# Patient Record
Sex: MALE | Race: WHITE | NOT HISPANIC OR LATINO | Employment: OTHER | URBAN - METROPOLITAN AREA
[De-identification: names, ages, dates, MRNs, and addresses within clinical notes are randomized per-mention and may not be internally consistent; named-entity substitution may affect disease eponyms.]

---

## 2018-01-16 ENCOUNTER — GENERIC CONVERSION - ENCOUNTER (OUTPATIENT)
Dept: OTHER | Facility: OTHER | Age: 73
End: 2018-01-16

## 2018-01-16 DIAGNOSIS — Z87.442 PERSONAL HISTORY OF URINARY CALCULI: ICD-10-CM

## 2018-01-23 NOTE — MISCELLANEOUS
Message   Recorded as Task   Date: 01/16/2018 10:44 AM, Created By: Alejandro Pedersne   Task Name: Care Coordination   Assigned To: Roni BRITT,TEAM   Regarding Patient: Sherine Haider, Status: Active   Comment:    Alejandro Pedersen - 16 Jan 2018 10:44 AM     TASK CREATED  Patient stated he never got a script to get his xray done  Can someone please give him a call back? He doesn't know if he needs it 3 Cll Font Martelo - 16 Jan 2018 11:09 AM     TASK EDITED  PT NEEDS RENAL US  HE WILL [address] FAXED TO [facility] PER HIS REQUEST  # [phone]  HE WILL CALL TO RESCHED APPT WITH DR ROBERTSON  Active Problems    1  Back pain (724 5) (M54 9)   2  Cervical paraspinal muscle spasm (728 85) (M62 838)   3  Glenohumeral arthritis, right (715 91) (M19 011)   4  History of kidney stones (V13 01) (Z87 442)   5  Knee pain (719 46) (M25 569)   6  Left hip pain (719 45) (M25 552)   7  Rib pain (786 50) (R07 81)    Current Meds   1  Aspirin 81 MG TABS; Therapy: (Recorded:15Jan2018) to Recorded   2  Diovan 80 MG Oral Tablet (Valsartan); Therapy: (Recorded:15Jan2018) to Recorded   3  Flomax 0 4 MG Oral Capsule (Tamsulosin HCl); Therapy: (Recorded:15Jan2018) to Recorded   4  GlipiZIDE ER 10 MG Oral Tablet Extended Release 24 Hour; Therapy: (Recorded:27May2016) to Recorded   5  Januvia TABS; Therapy: (Recorded:15Jan2018) to Recorded   6  MetFORMIN HCl ER (OSM) 1000 MG Oral Tablet Extended Release 24 Hour; Therapy: (Recorded:27May2016) to Recorded   7  Ocuvite TABS; Therapy: (Recorded:15Jan2018) to Recorded   8  Toprol XL 25 MG Oral Tablet Extended Release 24 Hour (Metoprolol Succinate ER); Therapy: (Recorded:15Jan2018) to Recorded   9  Tricor 145 MG Oral Tablet (Fenofibrate); Therapy: (Recorded:27May2016) to Recorded   10  Viberzi 75 MG Oral Tablet; Therapy: (Recorded:27May2016) to Recorded   11  Vitamin D3 1000 UNIT Oral Tablet;     Therapy: (Recorded:15Jan2018) to Recorded   12  Vytorin 10-80 MG Oral Tablet (Ezetimibe-Simvastatin); Therapy: (Recorded:72Xas9823) to Recorded    Allergies    1   No Known Drug Allergies    Signatures   Electronically signed by : Karey Miller RN; Jan 16 2018 11:10AM EST                       (Author)

## 2018-05-25 LAB — HBA1C MFR BLD HPLC: 4.2 %

## 2018-10-17 ENCOUNTER — TELEPHONE (OUTPATIENT)
Dept: GASTROENTEROLOGY | Facility: MEDICAL CENTER | Age: 73
End: 2018-10-17

## 2018-10-17 NOTE — TELEPHONE ENCOUNTER
New pt called to sched [facility]to in [place] for possible IBS as per [place] referral in chart   Please call pt to sched

## 2018-10-18 ENCOUNTER — TELEPHONE (OUTPATIENT)
Dept: GASTROENTEROLOGY | Facility: MEDICAL CENTER | Age: 73
End: 2018-10-18

## 2018-10-29 ENCOUNTER — TELEPHONE (OUTPATIENT)
Dept: UROLOGY | Facility: AMBULATORY SURGERY CENTER | Age: 73
End: 2018-10-29

## 2018-10-29 NOTE — TELEPHONE ENCOUNTER
Spoke with pt who states he thinks he passed a kidney stone  He would like to keep his appt in 12/18  Gave him # to schedule JASON prior to appt    Order in computer for that

## 2018-11-05 ENCOUNTER — OFFICE VISIT (OUTPATIENT)
Dept: GASTROENTEROLOGY | Facility: MEDICAL CENTER | Age: 73
End: 2018-11-05
Payer: OTHER GOVERNMENT

## 2018-11-05 VITALS
WEIGHT: 132.6 LBS | HEART RATE: 95 BPM | DIASTOLIC BLOOD PRESSURE: 84 MMHG | TEMPERATURE: 98.8 F | SYSTOLIC BLOOD PRESSURE: 142 MMHG | BODY MASS INDEX: 21.73 KG/M2

## 2018-11-05 DIAGNOSIS — K63.5 COLON POLYP: Primary | ICD-10-CM

## 2018-11-05 DIAGNOSIS — R19.4 RECENT CHANGE IN FREQUENCY OF BOWEL MOVEMENTS: ICD-10-CM

## 2018-11-05 PROCEDURE — 99203 OFFICE O/P NEW LOW 30 MIN: CPT | Performed by: INTERNAL MEDICINE

## 2018-11-05 RX ORDER — DIPHENOXYLATE HYDROCHLORIDE AND ATROPINE SULFATE 2.5; .025 MG/1; MG/1
1 TABLET ORAL
COMMUNITY
Start: 2017-03-16

## 2018-11-05 RX ORDER — UBIDECARENONE 75 MG
CAPSULE ORAL
COMMUNITY
End: 2019-03-25 | Stop reason: ALTCHOICE

## 2018-11-05 RX ORDER — UBIDECARENONE 100 MG
100 CAPSULE ORAL
COMMUNITY
End: 2019-03-25 | Stop reason: ALTCHOICE

## 2018-11-05 RX ORDER — GLIPIZIDE 10 MG/1
TABLET, FILM COATED, EXTENDED RELEASE ORAL
COMMUNITY
End: 2019-03-25 | Stop reason: ALTCHOICE

## 2018-11-05 RX ORDER — EZETIMIBE AND SIMVASTATIN 10; 80 MG/1; MG/1
TABLET ORAL
COMMUNITY
End: 2019-03-25 | Stop reason: ALTCHOICE

## 2018-11-05 RX ORDER — FENOFIBRATE 145 MG/1
TABLET, COATED ORAL
COMMUNITY
End: 2019-03-25 | Stop reason: ALTCHOICE

## 2018-11-05 RX ORDER — CEPHALEXIN 500 MG/1
500 CAPSULE ORAL
COMMUNITY
Start: 2018-10-30 | End: 2018-11-06

## 2018-11-05 RX ORDER — METOPROLOL SUCCINATE 50 MG/1
50 TABLET, EXTENDED RELEASE ORAL
COMMUNITY
Start: 2017-03-16

## 2018-11-05 RX ORDER — LISINOPRIL 2.5 MG/1
5 TABLET ORAL
COMMUNITY
Start: 2017-04-24

## 2018-11-05 RX ORDER — SIMVASTATIN 80 MG
40 TABLET ORAL
COMMUNITY
End: 2019-10-15 | Stop reason: ALTCHOICE

## 2018-11-05 RX ORDER — LANOLIN ALCOHOL/MO/W.PET/CERES
4.5 CREAM (GRAM) TOPICAL
COMMUNITY
Start: 2017-03-16

## 2018-11-05 RX ORDER — BIOTIN 1 MG
TABLET ORAL
COMMUNITY
End: 2019-03-25 | Stop reason: ALTCHOICE

## 2018-11-05 RX ORDER — IBUPROFEN 600 MG/1
600 TABLET ORAL EVERY 6 HOURS PRN
Refills: 0 | COMMUNITY
Start: 2018-09-07 | End: 2019-03-25 | Stop reason: ALTCHOICE

## 2018-11-05 NOTE — PROGRESS NOTES
[facility] - Outpatient Consultation  Bowen Holley 68 y o  male MRN: [id number]  Encounter: [id number]          ASSESSMENT AND PLAN:      1  Colon polyp  2  Recent change in frequency of bowel movements  - patient has recent change of bowel habits but he still has bowel movement once a day with no overt symptoms of constipation    - he has history of colon polyp and recent change of bowel habits, I would recommend to do a colonoscopy  However patient has established care with [facility]    He prefers to go back to his previous GI doctor for further care and  for colonoscopy   - I recommended him to have celiac panel and IBD workups      ______________________________________________________________________    HPI:      71-year-old man with history of irritable bowel syndrome presented for recent change of bowel habits  Patient was referred to [place]  Patient reported he has been having bowel movement once a day and he reported he feels complete evacuation but he has intermittent constipation  He denies blood in the stool  His last colonoscopy was 5 years ago with findings of polyp  He reported he had similar episodes 4 years ago and his symptoms lasted for 2 years  His change of bowel habits was diagnosed for irritable  bowel syndrome  He reported he has lower abdominal sensation of irritability but denies overt abdominal pain  He denies family history of colon cancer  Denies use of NSAIDs  He has some weight loss but not significant  REVIEW OF SYSTEMS:    CONSTITUTIONAL: Denies any fever, chills, rigors, and weight loss  HEENT: No earache or tinnitus  Denies hearing loss or visual disturbances  CARDIOVASCULAR: No chest pain or palpitations  RESPIRATORY: Denies any cough, hemoptysis, shortness of breath or dyspnea on exertion  GASTROINTESTINAL: As noted in the History of Present Illness  GENITOURINARY: No problems with urination   Denies any hematuria or dysuria  NEUROLOGIC: No dizziness or vertigo, denies headaches  MUSCULOSKELETAL: Denies any muscle or joint pain  SKIN: Denies skin rashes or itching  ENDOCRINE: Denies excessive thirst  Denies intolerance to heat or cold  PSYCHOSOCIAL: Denies depression or anxiety  Denies any recent memory loss  Historical Information   History reviewed  No pertinent past medical history  Past Surgical History:   Procedure Laterality Date    CARDIAC SURGERY       Social History   History   Alcohol Use    Yes     History   Drug Use No     History   Smoking Status    Never Smoker   Smokeless Tobacco    Never Used     History reviewed  No pertinent family history  Meds/Allergies       Current Outpatient Prescriptions:     aspirin 81 MG tablet    cephalexin (KEFLEX) 500 mg capsule    Cholecalciferol (VITAMIN D3) 1000 units CAPS    co-enzyme Q-10 100 mg capsule    cyanocobalamin (VITAMIN B-12) 100 mcg tablet    ezetimibe-simvastatin (VYTORIN) 10-80 MG per tablet    fenofibrate (TRICOR) 145 mg tablet    glipiZIDE (GLUCOTROL XL) 10 mg 24 hr tablet    ibuprofen (MOTRIN) 600 mg tablet    lisinopril (ZESTRIL) 2 5 mg tablet    MAGNESIUM PO    melatonin 3 mg    metFORMIN (GLUCOPHAGE) 1000 MG tablet    metoprolol succinate (TOPROL-XL) 50 mg 24 hr tablet    multivitamin (THERAGRAN) TABS    simvastatin (ZOCOR) 80 mg tablet    No Known Allergies        Objective     Blood pressure 142/84, pulse 95, temperature 98 8 °F (37 1 °C), temperature source Tympanic, weight 60 1 kg (132 lb 9 6 oz)  Body mass index is 21 73 kg/m²  PHYSICAL EXAM:      General Appearance:   Alert, cooperative, no distress   HEENT:   Normocephalic, atraumatic, anicteric      Neck:  Supple, symmetrical, trachea midline   Lungs:   Clear to auscultation bilaterally; no rales, rhonchi or wheezing; respirations unlabored    Heart[de-identified]   Regular rate and rhythm; no murmur, rub, or gallop     Abdomen:   Soft, non-tender, non-distended; normal bowel sounds; no masses, no organomegaly    Genitalia:   Deferred    Rectal:   Deferred    Extremities:  No cyanosis, clubbing or edema    Pulses:  2+ and symmetric    Skin:  No jaundice, rashes, or lesions    Lymph nodes:  No palpable cervical lymphadenopathy        Lab Results:   No visits with results within 1 Day(s) from this visit  Latest known visit with results is:   Orders Only on 05/25/2018   Component Date Value    Hemoglobin A1C 05/25/2018 4 2          Radiology Results:   No results found

## 2018-11-05 NOTE — LETTER
November 5, 2018     Elsa Cordero MD  [address] [address]    Patient: Trish Murray   YOB: 1945   Date of Visit: 11/5/2018       Dear Dr Mihai Benoit: Thank you for referring Trish Murray to me for evaluation  Below are my notes for this consultation  If you have questions, please do not hesitate to call me  I look forward to following your patient along with you  Sincerely,        Rosina Jordan MD        CC: No Recipients  Rosina Jordan MD  11/5/2018  4:13 PM  Sign at close encounter  [facility] - Outpatient Consultation  Trish Murray 68 y o  male MRN: [id number]  Encounter: [id number]          ASSESSMENT AND PLAN:      1  Colon polyp  2  Recent change in frequency of bowel movements  - patient has recent change of bowel habits but he still has bowel movement once a day with no overt symptoms of constipation    - he has history of colon polyp and recent change of bowel habits, I would recommend to do a colonoscopy  However patient has established care with [facility]    He prefers to go back to his previous GI doctor for further care and  for colonoscopy   - I recommended him to have celiac panel and IBD workups      ______________________________________________________________________    HPI:      60-year-old man with history of irritable bowel syndrome presented for recent change of bowel habits  Patient was referred to [address] [place] [address]  Patient reported he has been having bowel movement once a day and he reported he feels complete evacuation but he has intermittent constipation  He denies blood in the stool  His last colonoscopy was 5 years ago with findings of polyp  He reported he had similar episodes 4 years ago and his symptoms lasted for 2 years  His change of bowel habits was diagnosed for irritable  bowel syndrome  He reported he has lower abdominal sensation of irritability but denies overt abdominal pain   He denies family history of colon cancer  Denies use of NSAIDs  He has some weight loss but not significant  REVIEW OF SYSTEMS:    CONSTITUTIONAL: Denies any fever, chills, rigors, and weight loss  HEENT: No earache or tinnitus  Denies hearing loss or visual disturbances  CARDIOVASCULAR: No chest pain or palpitations  RESPIRATORY: Denies any cough, hemoptysis, shortness of breath or dyspnea on exertion  GASTROINTESTINAL: As noted in the History of Present Illness  GENITOURINARY: No problems with urination  Denies any hematuria or dysuria  NEUROLOGIC: No dizziness or vertigo, denies headaches  MUSCULOSKELETAL: Denies any muscle or joint pain  SKIN: Denies skin rashes or itching  ENDOCRINE: Denies excessive thirst  Denies intolerance to heat or cold  PSYCHOSOCIAL: Denies depression or anxiety  Denies any recent memory loss  Historical Information   History reviewed  No pertinent past medical history  Past Surgical History:   Procedure Laterality Date    CARDIAC SURGERY       Social History   History   Alcohol Use    Yes     History   Drug Use No     History   Smoking Status    Never Smoker   Smokeless Tobacco    Never Used     History reviewed  No pertinent family history      Meds/Allergies       Current Outpatient Prescriptions:     aspirin 81 MG tablet    cephalexin (KEFLEX) 500 mg capsule    Cholecalciferol (VITAMIN D3) 1000 units CAPS    co-enzyme Q-10 100 mg capsule    cyanocobalamin (VITAMIN B-12) 100 mcg tablet    ezetimibe-simvastatin (VYTORIN) 10-80 MG per tablet    fenofibrate (TRICOR) 145 mg tablet    glipiZIDE (GLUCOTROL XL) 10 mg 24 hr tablet    ibuprofen (MOTRIN) 600 mg tablet    lisinopril (ZESTRIL) 2 5 mg tablet    MAGNESIUM PO    melatonin 3 mg    metFORMIN (GLUCOPHAGE) 1000 MG tablet    metoprolol succinate (TOPROL-XL) 50 mg 24 hr tablet    multivitamin (THERAGRAN) TABS    simvastatin (ZOCOR) 80 mg tablet    No Known Allergies        Objective     Blood pressure 142/84, pulse 95, temperature 98 8 °F (37 1 °C), temperature source Tympanic, weight 60 1 kg (132 lb 9 6 oz)  Body mass index is 21 73 kg/m²  PHYSICAL EXAM:      General Appearance:   Alert, cooperative, no distress   HEENT:   Normocephalic, atraumatic, anicteric      Neck:  Supple, symmetrical, trachea midline   Lungs:   Clear to auscultation bilaterally; no rales, rhonchi or wheezing; respirations unlabored    Heart[de-identified]   Regular rate and rhythm; no murmur, rub, or gallop  Abdomen:   Soft, non-tender, non-distended; normal bowel sounds; no masses, no organomegaly    Genitalia:   Deferred    Rectal:   Deferred    Extremities:  No cyanosis, clubbing or edema    Pulses:  2+ and symmetric    Skin:  No jaundice, rashes, or lesions    Lymph nodes:  No palpable cervical lymphadenopathy        Lab Results:   No visits with results within 1 Day(s) from this visit  Latest known visit with results is:   Orders Only on 05/25/2018   Component Date Value    Hemoglobin A1C 05/25/2018 4 2          Radiology Results:   No results found

## 2018-11-12 ENCOUNTER — OFFICE VISIT (OUTPATIENT)
Dept: FAMILY MEDICINE CLINIC | Facility: CLINIC | Age: 73
End: 2018-11-12
Payer: COMMERCIAL

## 2018-11-12 VITALS
HEIGHT: 65 IN | SYSTOLIC BLOOD PRESSURE: 120 MMHG | HEART RATE: 74 BPM | DIASTOLIC BLOOD PRESSURE: 78 MMHG | RESPIRATION RATE: 16 BRPM | TEMPERATURE: 98.5 F | WEIGHT: 131 LBS | OXYGEN SATURATION: 98 % | BODY MASS INDEX: 21.83 KG/M2

## 2018-11-12 DIAGNOSIS — I10 ESSENTIAL HYPERTENSION: ICD-10-CM

## 2018-11-12 DIAGNOSIS — F51.01 PRIMARY INSOMNIA: ICD-10-CM

## 2018-11-12 DIAGNOSIS — I25.10 ATHEROSCLEROSIS OF NATIVE CORONARY ARTERY OF NATIVE HEART WITHOUT ANGINA PECTORIS: ICD-10-CM

## 2018-11-12 DIAGNOSIS — E11.21 TYPE 2 DIABETES MELLITUS WITH DIABETIC NEPHROPATHY, WITHOUT LONG-TERM CURRENT USE OF INSULIN (HCC): Primary | ICD-10-CM

## 2018-11-12 PROBLEM — S12.000A FRACTURE OF C1 VERTEBRA, CLOSED (HCC): Status: ACTIVE | Noted: 2017-01-24

## 2018-11-12 PROBLEM — N17.9 AKI (ACUTE KIDNEY INJURY) (HCC): Status: ACTIVE | Noted: 2017-01-24

## 2018-11-12 PROBLEM — S32.10XA SACRAL FRACTURE (HCC): Status: ACTIVE | Noted: 2017-01-24

## 2018-11-12 PROBLEM — I25.5 ISCHEMIC CARDIOMYOPATHY: Status: ACTIVE | Noted: 2017-04-13

## 2018-11-12 LAB
CREAT UR-MCNC: 57 MG/DL
EST. AVERAGE GLUCOSE BLD GHB EST-MCNC: 128 MG/DL
HBA1C MFR BLD: 6.1 % (ref 4.2–6.3)
MICROALBUMIN UR-MCNC: 960 MG/L (ref 0–20)
MICROALBUMIN/CREAT 24H UR: 1684 MG/G CREATININE (ref 0–30)

## 2018-11-12 PROCEDURE — 3066F NEPHROPATHY DOC TX: CPT | Performed by: PHYSICIAN ASSISTANT

## 2018-11-12 PROCEDURE — 3062F POS MACROALBUMINURIA REV: CPT | Performed by: PHYSICIAN ASSISTANT

## 2018-11-12 PROCEDURE — 3044F HG A1C LEVEL LT 7.0%: CPT | Performed by: PHYSICIAN ASSISTANT

## 2018-11-12 PROCEDURE — 1101F PT FALLS ASSESS-DOCD LE1/YR: CPT | Performed by: PHYSICIAN ASSISTANT

## 2018-11-12 PROCEDURE — 3074F SYST BP LT 130 MM HG: CPT | Performed by: PHYSICIAN ASSISTANT

## 2018-11-12 PROCEDURE — 1036F TOBACCO NON-USER: CPT | Performed by: PHYSICIAN ASSISTANT

## 2018-11-12 PROCEDURE — 36415 COLL VENOUS BLD VENIPUNCTURE: CPT | Performed by: PHYSICIAN ASSISTANT

## 2018-11-12 PROCEDURE — 82043 UR ALBUMIN QUANTITATIVE: CPT | Performed by: PHYSICIAN ASSISTANT

## 2018-11-12 PROCEDURE — 3008F BODY MASS INDEX DOCD: CPT | Performed by: PHYSICIAN ASSISTANT

## 2018-11-12 PROCEDURE — 3078F DIAST BP <80 MM HG: CPT | Performed by: PHYSICIAN ASSISTANT

## 2018-11-12 PROCEDURE — 1160F RVW MEDS BY RX/DR IN RCRD: CPT | Performed by: PHYSICIAN ASSISTANT

## 2018-11-12 PROCEDURE — 82570 ASSAY OF URINE CREATININE: CPT | Performed by: PHYSICIAN ASSISTANT

## 2018-11-12 PROCEDURE — 99204 OFFICE O/P NEW MOD 45 MIN: CPT | Performed by: PHYSICIAN ASSISTANT

## 2018-11-12 PROCEDURE — 83036 HEMOGLOBIN GLYCOSYLATED A1C: CPT | Performed by: PHYSICIAN ASSISTANT

## 2018-11-12 RX ORDER — ZOLPIDEM TARTRATE 5 MG/1
5 TABLET ORAL
Qty: 30 TABLET | Refills: 5 | Status: SHIPPED | OUTPATIENT
Start: 2018-11-12 | End: 2018-11-16 | Stop reason: SINTOL

## 2018-11-12 NOTE — ASSESSMENT & PLAN NOTE
hba1c in May 6 0, will recheck today  Had a normal foot exam  Denies hypoglycemic episodes  Continue current management  UTD pneumonia and flu shot   Urine microalbumin collected

## 2018-11-13 ENCOUNTER — TELEPHONE (OUTPATIENT)
Dept: FAMILY MEDICINE CLINIC | Facility: CLINIC | Age: 73
End: 2018-11-13

## 2018-11-13 NOTE — TELEPHONE ENCOUNTER
----- Message from Carissa Echavarria PA-C sent at 11/13/2018 12:54 PM EST -----  Please tell pt hba1c well controlled at 6 1

## 2018-11-14 ENCOUNTER — APPOINTMENT (OUTPATIENT)
Dept: LAB | Facility: MEDICAL CENTER | Age: 73
End: 2018-11-14
Attending: INTERNAL MEDICINE
Payer: COMMERCIAL

## 2018-11-14 ENCOUNTER — HOSPITAL ENCOUNTER (OUTPATIENT)
Dept: ULTRASOUND IMAGING | Facility: MEDICAL CENTER | Age: 73
Discharge: HOME | End: 2018-11-14
Attending: UROLOGY
Payer: COMMERCIAL

## 2018-11-14 DIAGNOSIS — R19.4 RECENT CHANGE IN FREQUENCY OF BOWEL MOVEMENTS: ICD-10-CM

## 2018-11-14 DIAGNOSIS — Z87.442 PERSONAL HISTORY OF URINARY CALCULI: ICD-10-CM

## 2018-11-14 LAB
CRP SERPL QL: 5.9 MG/L
ERYTHROCYTE [SEDIMENTATION RATE] IN BLOOD: 18 MM/HOUR (ref 0–10)

## 2018-11-14 PROCEDURE — 86140 C-REACTIVE PROTEIN: CPT

## 2018-11-14 PROCEDURE — 82784 ASSAY IGA/IGD/IGG/IGM EACH: CPT

## 2018-11-14 PROCEDURE — 36415 COLL VENOUS BLD VENIPUNCTURE: CPT

## 2018-11-14 PROCEDURE — 83516 IMMUNOASSAY NONANTIBODY: CPT

## 2018-11-14 PROCEDURE — 85652 RBC SED RATE AUTOMATED: CPT

## 2018-11-14 PROCEDURE — 76770 US EXAM ABDO BACK WALL COMP: CPT

## 2018-11-14 PROCEDURE — 86255 FLUORESCENT ANTIBODY SCREEN: CPT

## 2018-11-15 LAB
ENDOMYSIUM IGA SER QL: NEGATIVE
GLIADIN PEPTIDE IGA SER-ACNC: 7 UNITS (ref 0–19)
GLIADIN PEPTIDE IGG SER-ACNC: 3 UNITS (ref 0–19)
IGA SERPL-MCNC: 220 MG/DL (ref 61–437)
TTG IGA SER-ACNC: <2 U/ML (ref 0–3)
TTG IGG SER-ACNC: <2 U/ML (ref 0–5)

## 2018-11-16 DIAGNOSIS — F51.01 PRIMARY INSOMNIA: Primary | ICD-10-CM

## 2018-11-16 RX ORDER — TRAZODONE HYDROCHLORIDE 50 MG/1
50 TABLET ORAL
Qty: 30 TABLET | Refills: 5 | Status: CANCELLED | OUTPATIENT
Start: 2018-11-16

## 2018-11-16 RX ORDER — AMITRIPTYLINE HYDROCHLORIDE 25 MG/1
25 TABLET, FILM COATED ORAL
Qty: 30 TABLET | Refills: 5 | Status: SHIPPED | OUTPATIENT
Start: 2018-11-16 | End: 2019-01-15 | Stop reason: SINTOL

## 2018-11-16 NOTE — TELEPHONE ENCOUNTER
Called patient back, offered trazodone but he states he did not do well on this either  Stated lorazepam worked well but I told him I would not use that as a sleep aid  We agreed to start amitriptyline 25 mg at night- pt had EKG without QT prolongation 2 weeks ago    He will also establish care with a sleep program through the [facility]

## 2018-11-16 NOTE — TELEPHONE ENCOUNTER
Patient called stating that you put him on [place], he is unable to take this medication because the first time he tried the medication he did not sleep well on it and felt funny  The second time he took the medication he stated that he had an upset stomach, nightmares and still did not feel good on the medication  The third time patient stated he had the same symptoms   Please advise

## 2018-11-29 ENCOUNTER — TELEPHONE (OUTPATIENT)
Dept: FAMILY MEDICINE CLINIC | Facility: CLINIC | Age: 73
End: 2018-11-29

## 2018-11-29 NOTE — TELEPHONE ENCOUNTER
Patient called stating that the amitriptyline is not working for his sleeping  Patient stated that he stopped the medication three weeks ago and he only used three tablets, states that he did not feel comfortable taking the medication  Patient stated that he did not like the feeling in his head, upset stomach  Please advise

## 2018-11-30 ENCOUNTER — TELEPHONE (OUTPATIENT)
Dept: FAMILY MEDICINE CLINIC | Facility: CLINIC | Age: 73
End: 2018-11-30

## 2018-11-30 DIAGNOSIS — F41.9 ANXIETY: Primary | ICD-10-CM

## 2018-11-30 RX ORDER — HYDROXYZINE HYDROCHLORIDE 25 MG/1
25 TABLET, FILM COATED ORAL EVERY 6 HOURS PRN
Qty: 30 TABLET | Refills: 0 | Status: SHIPPED | OUTPATIENT
Start: 2018-11-30 | End: 2019-01-15

## 2018-11-30 NOTE — TELEPHONE ENCOUNTER
[facility] returned my call Re: Pt's hx of ativan  They have been trying to reduce his ativan due to his age and the risk of respiratory depression  They gave a prescription for atarax and trazodone for the patient to take  He has an appt with NP on 12/19 to address these issues  Spoke to pt regarding this, states he never tried the atarax  I will rx him a prescription for this  Explained that he should not be seeing multiple providers regarding the same health issues   He will f/u with psych as scheduled and he has an appt with [address] as well

## 2018-11-30 NOTE — TELEPHONE ENCOUNTER
[address] where Dr Debby Bridges works who has been rxing ativan for the pt via [employer]- left message with MA for Dr Debby Bridges to return call regarding if he is still prescribing this for the patient and if not why as pt states he has been unable to tolerate trazodone, ambien, amitriptyline and states that "ativan is what works"

## 2018-12-03 ENCOUNTER — OFFICE VISIT (OUTPATIENT)
Dept: UROLOGY | Facility: MEDICAL CENTER | Age: 73
End: 2018-12-03
Payer: COMMERCIAL

## 2018-12-03 VITALS
BODY MASS INDEX: 21.66 KG/M2 | SYSTOLIC BLOOD PRESSURE: 120 MMHG | DIASTOLIC BLOOD PRESSURE: 80 MMHG | WEIGHT: 130 LBS | HEART RATE: 70 BPM | HEIGHT: 65 IN

## 2018-12-03 DIAGNOSIS — N40.1 BPH WITH URINARY OBSTRUCTION: ICD-10-CM

## 2018-12-03 DIAGNOSIS — N28.1 RENAL CYST: ICD-10-CM

## 2018-12-03 DIAGNOSIS — R31.9 HEMATURIA, UNSPECIFIED TYPE: Primary | ICD-10-CM

## 2018-12-03 DIAGNOSIS — N13.8 BPH WITH URINARY OBSTRUCTION: ICD-10-CM

## 2018-12-03 LAB
SL AMB  POCT GLUCOSE, UA: ABNORMAL
SL AMB LEUKOCYTE ESTERASE,UA: ABNORMAL
SL AMB POCT BILIRUBIN,UA: ABNORMAL
SL AMB POCT BLOOD,UA: ABNORMAL
SL AMB POCT CLARITY,UA: CLEAR
SL AMB POCT COLOR,UA: YELLOW
SL AMB POCT KETONES,UA: ABNORMAL
SL AMB POCT NITRITE,UA: ABNORMAL
SL AMB POCT PH,UA: 5.5
SL AMB POCT SPECIFIC GRAVITY,UA: 1.01
SL AMB POCT URINE PROTEIN: 100
SL AMB POCT UROBILINOGEN: 1

## 2018-12-03 PROCEDURE — 81003 URINALYSIS AUTO W/O SCOPE: CPT | Performed by: UROLOGY

## 2018-12-03 PROCEDURE — 99214 OFFICE O/P EST MOD 30 MIN: CPT | Performed by: UROLOGY

## 2018-12-03 NOTE — LETTER
December 3, 2018     [address] [place] [address]    Patient: Henny Hanson   YOB: 1945   Date of Visit: 12/3/2018       Dear Dr Ion Jiménez: Thank you for referring Henny Hanson to me for evaluation  Below are my notes for this consultation  If you have questions, please do not hesitate to call me  I look forward to following your patient along with you  Sincerely,        Indra Ibrahim MD        CC: No Recipients  Indra Ibrahim MD  12/3/2018  3:38 PM  Sign at close encounter  Assessment/Plan:  1  Likely passed a small stone, urine is clear now  2  Ultrasound last month showed no kidney stones, some chronic renal cyst that we have seen before  3  Check PSA soon  4  If no other symptoms, follow-up two years  No problem-specific Assessment & Plan notes found for this encounter  Diagnoses and all orders for this visit:    Hematuria, unspecified type  -     POCT urine dip auto non-scope    BPH with urinary obstruction  -     PSA Total, Diagnostic; Future    Renal cyst          Subjective:      Patient ID: Henny Hanson is a 68 y o  male  Follow-up for history of stones:  Treatment many years ago and no symptoms at all until just recently  Six weeks ago had some urgency frequency, lower abdominal pain and pressure and slight amount of gross hematuria  After he voided, symptoms all went away  Never really saw stone come out, but he feels that is what it was  Minimal BPH symptoms, occasional urgency but nothing bothersome        The following portions of the patient's history were reviewed and updated as appropriate: allergies, current medications, past family history, past medical history, past social history, past surgical history and problem list     Review of Systems   All other systems reviewed and are negative          Objective:      /80 (BP Location: Left arm, Patient Position: Sitting, Cuff Size: Standard)   Pulse 70   Ht 5' 5" (1 651 m)   Wt 59 kg (130 lb)   BMI 21 63 kg/m²           Physical Exam   Constitutional: He is oriented to person, place, and time  He appears well-developed and well-nourished  No distress  HENT:   Head: Normocephalic and atraumatic  Eyes: Conjunctivae are normal    Cardiovascular: Normal rate and regular rhythm  Pulmonary/Chest: Effort normal and breath sounds normal  No respiratory distress  He has no wheezes  Abdominal: Soft  Bowel sounds are normal  He exhibits no distension and no mass  There is no tenderness  Genitourinary:   Genitourinary Comments: Penis and testes normal    Prostate minimally enlarged no nodules  Neurological: He is alert and oriented to person, place, and time  Skin: Skin is warm and dry  He is not diaphoretic

## 2018-12-03 NOTE — PROGRESS NOTES
Assessment/Plan:  1  Likely passed a small stone, urine is clear now  2  Ultrasound last month showed no kidney stones, some chronic renal cyst that we have seen before  3  Check PSA soon  4  If no other symptoms, follow-up two years  No problem-specific Assessment & Plan notes found for this encounter  Diagnoses and all orders for this visit:    Hematuria, unspecified type  -     POCT urine dip auto non-scope    BPH with urinary obstruction  -     PSA Total, Diagnostic; Future    Renal cyst          Subjective:      Patient ID: Everette Jeans is a 68 y o  male  Follow-up for history of stones:  Treatment many years ago and no symptoms at all until just recently  Six weeks ago had some urgency frequency, lower abdominal pain and pressure and slight amount of gross hematuria  After he voided, symptoms all went away  Never really saw stone come out, but he feels that is what it was  Minimal BPH symptoms, occasional urgency but nothing bothersome        The following portions of the patient's history were reviewed and updated as appropriate: allergies, current medications, past family history, past medical history, past social history, past surgical history and problem list     Review of Systems   All other systems reviewed and are negative  Objective:      /80 (BP Location: Left arm, Patient Position: Sitting, Cuff Size: Standard)   Pulse 70   Ht 5' 5" (1 651 m)   Wt 59 kg (130 lb)   BMI 21 63 kg/m²          Physical Exam   Constitutional: He is oriented to person, place, and time  He appears well-developed and well-nourished  No distress  HENT:   Head: Normocephalic and atraumatic  Eyes: Conjunctivae are normal    Cardiovascular: Normal rate and regular rhythm  Pulmonary/Chest: Effort normal and breath sounds normal  No respiratory distress  He has no wheezes  Abdominal: Soft  Bowel sounds are normal  He exhibits no distension and no mass  There is no tenderness  Genitourinary:   Genitourinary Comments: Penis and testes normal    Prostate minimally enlarged no nodules  Neurological: He is alert and oriented to person, place, and time  Skin: Skin is warm and dry  He is not diaphoretic

## 2018-12-03 NOTE — LETTER
December 3, 2018     No Recipients    Patient: Paige Henderson   YOB: 1945   Date of Visit: 12/3/2018       Dear Dr Ritesh Knig Recipients: Thank you for referring Paige Henderson to me for evaluation  Below are my notes for this consultation  If you have questions, please do not hesitate to call me  I look forward to following your patient along with you  Sincerely,        Priti Yu MD        CC: No Recipients  Priti Yu MD  12/3/2018  3:38 PM  Sign at close encounter  Assessment/Plan:  1  Likely passed a small stone, urine is clear now  2  Ultrasound last month showed no kidney stones, some chronic renal cyst that we have seen before  3  Check PSA soon  4  If no other symptoms, follow-up two years  No problem-specific Assessment & Plan notes found for this encounter  Diagnoses and all orders for this visit:    Hematuria, unspecified type  -     POCT urine dip auto non-scope    BPH with urinary obstruction  -     PSA Total, Diagnostic; Future    Renal cyst          Subjective:      Patient ID: Paige Henderson is a 68 y o  male  Follow-up for history of stones:  Treatment many years ago and no symptoms at all until just recently  Six weeks ago had some urgency frequency, lower abdominal pain and pressure and slight amount of gross hematuria  After he voided, symptoms all went away  Never really saw stone come out, but he feels that is what it was  Minimal BPH symptoms, occasional urgency but nothing bothersome        The following portions of the patient's history were reviewed and updated as appropriate: allergies, current medications, past family history, past medical history, past social history, past surgical history and problem list     Review of Systems   All other systems reviewed and are negative          Objective:      /80 (BP Location: Left arm, Patient Position: Sitting, Cuff Size: Standard)   Pulse 70   Ht 5' 5" (1 651 m)   Wt 59 kg (130 lb)   BMI 21 63 kg/m²           Physical Exam   Constitutional: He is oriented to person, place, and time  He appears well-developed and well-nourished  No distress  HENT:   Head: Normocephalic and atraumatic  Eyes: Conjunctivae are normal    Cardiovascular: Normal rate and regular rhythm  Pulmonary/Chest: Effort normal and breath sounds normal  No respiratory distress  He has no wheezes  Abdominal: Soft  Bowel sounds are normal  He exhibits no distension and no mass  There is no tenderness  Genitourinary:   Genitourinary Comments: Penis and testes normal    Prostate minimally enlarged no nodules  Neurological: He is alert and oriented to person, place, and time  Skin: Skin is warm and dry  He is not diaphoretic

## 2018-12-05 ENCOUNTER — APPOINTMENT (OUTPATIENT)
Dept: RADIOLOGY | Facility: MEDICAL CENTER | Age: 73
End: 2018-12-05
Payer: COMMERCIAL

## 2018-12-05 ENCOUNTER — APPOINTMENT (OUTPATIENT)
Dept: LAB | Facility: MEDICAL CENTER | Age: 73
End: 2018-12-05
Payer: COMMERCIAL

## 2018-12-05 ENCOUNTER — OFFICE VISIT (OUTPATIENT)
Dept: URGENT CARE | Facility: MEDICAL CENTER | Age: 73
End: 2018-12-05
Payer: COMMERCIAL

## 2018-12-05 ENCOUNTER — APPOINTMENT (OUTPATIENT)
Dept: LAB | Facility: MEDICAL CENTER | Age: 73
End: 2018-12-05
Attending: UROLOGY
Payer: COMMERCIAL

## 2018-12-05 ENCOUNTER — TRANSCRIBE ORDERS (OUTPATIENT)
Dept: URGENT CARE | Facility: MEDICAL CENTER | Age: 73
End: 2018-12-05

## 2018-12-05 VITALS
BODY MASS INDEX: 21.66 KG/M2 | HEART RATE: 80 BPM | RESPIRATION RATE: 16 BRPM | HEIGHT: 65 IN | WEIGHT: 130 LBS | TEMPERATURE: 98.5 F | OXYGEN SATURATION: 96 % | DIASTOLIC BLOOD PRESSURE: 81 MMHG | SYSTOLIC BLOOD PRESSURE: 170 MMHG
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
## 2018-12-05 DIAGNOSIS — N13.8 BPH WITH URINARY OBSTRUCTION: ICD-10-CM

## 2018-12-05 DIAGNOSIS — S90.31XA CONTUSION OF RIGHT FOOT, INITIAL ENCOUNTER: ICD-10-CM

## 2018-12-05 DIAGNOSIS — N40.1 BPH WITH URINARY OBSTRUCTION: ICD-10-CM

## 2018-12-05 DIAGNOSIS — S63.279A CLOSED DISLOCATION OF INTERPHALANGEAL JOINT OF RIGHT HAND, INITIAL ENCOUNTER: ICD-10-CM

## 2018-12-05 DIAGNOSIS — S63.279A CLOSED DISLOCATION OF INTERPHALANGEAL JOINT OF RIGHT HAND, INITIAL ENCOUNTER: Primary | ICD-10-CM

## 2018-12-05 LAB — PSA SERPL-MCNC: 6.7 NG/ML (ref 0–4)

## 2018-12-05 PROCEDURE — 96372 THER/PROPH/DIAG INJ SC/IM: CPT | Performed by: PHYSICIAN ASSISTANT

## 2018-12-05 PROCEDURE — 84153 ASSAY OF PSA TOTAL: CPT

## 2018-12-05 PROCEDURE — 73630 X-RAY EXAM OF FOOT: CPT

## 2018-12-05 PROCEDURE — 73140 X-RAY EXAM OF FINGER(S): CPT

## 2018-12-05 PROCEDURE — 99213 OFFICE O/P EST LOW 20 MIN: CPT | Performed by: PHYSICIAN ASSISTANT

## 2018-12-05 PROCEDURE — 73130 X-RAY EXAM OF HAND: CPT

## 2018-12-05 RX ORDER — LIDOCAINE HYDROCHLORIDE 10 MG/ML
10 INJECTION, SOLUTION EPIDURAL; INFILTRATION; INTRACAUDAL; PERINEURAL ONCE
Status: COMPLETED | OUTPATIENT
Start: 2018-12-05 | End: 2018-12-05

## 2018-12-05 RX ADMIN — LIDOCAINE HYDROCHLORIDE 10 ML: 10 INJECTION, SOLUTION EPIDURAL; INFILTRATION; INTRACAUDAL; PERINEURAL at 12:15

## 2018-12-05 NOTE — PATIENT INSTRUCTIONS
Rest, ice, elevate the affected limb  Take over-the-counter pain medication for symptom relief  Follow up with Orthopedics this week  Call Jonelle Pope to schedule an appointment: [phone]  Go to ER if new or worsening symptoms occur  Finger Dislocation   WHAT YOU NEED TO KNOW:   A finger dislocation occurs when bones in your finger move out of their normal position  This takes place at a joint (where bones meet)  DISCHARGE INSTRUCTIONS:   Care for your finger:  Care for your finger as directed  This may help reduce pain and swelling  It also may improve how well you can move and use your finger  · Ice your finger: This can help decrease pain and swelling  Place a plastic bag filled with ice, or an ice pack, on your finger  Apply an ice pack as often as directed  · Elevate your finger:  Keep your finger above the level of your heart  This can help reduce swelling  Prop your arm or hand on a pillow  This should be done as often as you can for the first 1 to 3 days after your injury  Medicines:   · Pain medicine: You may be given medicine to take at home to take away or decrease pain  Do not wait until the pain is too bad before taking your medicine  · Take your medicine as directed  Contact your healthcare provider if you think your medicine is not helping or if you have side effects  Tell him or her if you are allergic to any medicine  Keep a list of the medicines, vitamins, and herbs you take  Include the amounts, and when and why you take them  Bring the list or the pill bottles to follow-up visits  Carry your medicine list with you in case of an emergency  Exercise your finger:  Exercise can help reduce pain, swelling, and stiffness in your finger  It also can help increase strength and movement  You may need to exercise your finger as soon as you can  You also may be told not to move your finger for a few weeks  Be sure to follow your healthcare provider's instructions  Occupational therapy (OT) may be ordered for you  A therapist works with you to help you regain movement in your finger  Care for your splint or cast:  Your injured finger may be freddy-taped, splinted, or put in a cast to hold your finger or thumb in place while it heals  Freddy tape is when your injured finger is taped to the finger next to it  A splint is a piece of stiff material attached to your finger using cloth straps  You may have these treatments for 8 weeks or more  To care for your splint or cast:  · Do not get your splint or cast wet  Use a plastic bag to cover the splint or cast if you shower  · Keep your splint or cast clean  Make sure no dirt gets under your splint or cast     · Do not trim your cast without talking to your healthcare provider  Also, never remove your cast on your own  Follow up with your healthcare provider or hand specialist as directed:  Write down your questions so you remember to ask them during your follow-up visits  Contact your healthcare provider or hand specialist if:   · You have numbness or tingling in your hand  · The skin under your cast or splint burns or stings  · The skin around your cast becomes red or raw  · Your cast becomes cracked or damaged  · You have questions or concerns about your injury or treatment  Return to the emergency department if:   · You have increased swelling beneath your splint or cast     · You think your cast or splint is too tight  · You cannot move your fingers  © 2017 [address] Information is for End User's use only and may not be sold, redistributed or otherwise used for commercial purposes  All illustrations and images included in CareNotes® are the copyrighted property of A D [employer] M [employer]  or Dylan Solorzano  The above information is an  only  It is not intended as medical advice for individual conditions or treatments   Talk to your doctor, nurse or pharmacist before following any medical regimen to see if it is safe and effective for you

## 2018-12-06 ENCOUNTER — TELEPHONE (OUTPATIENT)
Dept: UROLOGY | Facility: MEDICAL CENTER | Age: 73
End: 2018-12-06

## 2018-12-06 ENCOUNTER — TELEPHONE (OUTPATIENT)
Dept: FAMILY MEDICINE CLINIC | Facility: CLINIC | Age: 73
End: 2018-12-06

## 2018-12-06 NOTE — TELEPHONE ENCOUNTER
Ramiro Arias MD  [address] [place] [address] [facility] [place] [facility]             PSA mildly elevated, but possibly okay for his age  Repeat in six months, phone report  LMOM to return call for results

## 2018-12-06 NOTE — TELEPHONE ENCOUNTER
Patient wanted to know why his Hydroxyzine was denied and wanted to know what else he could try   Per Isaura Kam said to talk to psychiatrist

## 2018-12-07 ENCOUNTER — APPOINTMENT (OUTPATIENT)
Dept: RADIOLOGY | Facility: CLINIC | Age: 73
End: 2018-12-07
Payer: COMMERCIAL

## 2018-12-07 ENCOUNTER — OFFICE VISIT (OUTPATIENT)
Dept: OBGYN CLINIC | Facility: MEDICAL CENTER | Age: 73
End: 2018-12-07
Payer: COMMERCIAL

## 2018-12-07 VITALS
DIASTOLIC BLOOD PRESSURE: 88 MMHG | HEART RATE: 78 BPM | SYSTOLIC BLOOD PRESSURE: 147 MMHG | WEIGHT: 133 LBS | BODY MASS INDEX: 22.16 KG/M2 | HEIGHT: 65 IN

## 2018-12-07 DIAGNOSIS — M79.644 PAIN IN RIGHT FINGER(S): ICD-10-CM

## 2018-12-07 DIAGNOSIS — M79.641 PAIN OF RIGHT HAND: ICD-10-CM

## 2018-12-07 DIAGNOSIS — S63.289A DISLOCATION OF PROXIMAL INTERPHALANGEAL JOINT OF FINGER, INITIAL ENCOUNTER: Primary | ICD-10-CM

## 2018-12-07 PROCEDURE — 99203 OFFICE O/P NEW LOW 30 MIN: CPT | Performed by: EMERGENCY MEDICINE

## 2018-12-07 PROCEDURE — 73140 X-RAY EXAM OF FINGER(S): CPT

## 2018-12-07 NOTE — PATIENT INSTRUCTIONS
You may continue freddy taping or a splint for your finger  You may take over the counter medicines for your pain  You may begin to work on range of motion at home in a few days as you feel comfortable  We dont want the finger to get stiff

## 2018-12-07 NOTE — PROGRESS NOTES
Assessment/Plan:    Diagnoses and all orders for this visit:    Dislocation of proximal interphalangeal joint of finger, initial encounter    Pain in right finger(s)    Pain of right hand  -     XR finger right fifth digit-pinkie; Future    Patient has the option of splint in flexion or freddy taping  I have freddy taped his 4-5 fingers today which he seems to prefer  I have encouraged him to work on gentle ROM as tolerated to prevent stiffness  Return in about 2 weeks (around 12/21/2018)  Chief Complaint:   finger injury    Subjective:   Patient ID: Judy Gomez is a 68 y o  male  NP presents for right 5th PIP D/L reduced in Urgent Care and placed in splint  Review of Systems   Constitutional: Negative for fever  Respiratory: Negative for shortness of breath  Cardiovascular: Negative for chest pain  Gastrointestinal: Negative for abdominal pain  Musculoskeletal: Positive for arthralgias and joint swelling  Neurological: Negative for weakness  The following portions of the patient's chart were reviewed and updated as appropriate:    Allergy:  No Known Allergies      Past Medical History:   Diagnosis Date    Anxiety     Arthritis     BPH with obstruction/lower urinary tract symptoms     Diabetes mellitus (HCC)     Hypertension     Renal calculi     Ureter, calculus     Urinary stone        Past Surgical History:   Procedure Laterality Date    CARDIAC SURGERY      CHOLECYSTECTOMY      CYSTOSCOPY W/ URETEROSCOPY         Social History     Social History    Marital status: Single     Spouse name: N/A    Number of children: N/A    Years of education: N/A     Occupational History    retired      Social History Main Topics    Smoking status: Never Smoker    Smokeless tobacco: Never Used    Alcohol use Yes    Drug use: No    Sexual activity: Not on file     Other Topics Concern    Not on file     Social History Narrative    Caffeine use        Family History Problem Relation Age of Onset    Cancer Mother     Arthritis Father     Heart disease Father         cardiac disorder    Cancer Family     Arthritis Family        Medications:    Current Outpatient Prescriptions:     amitriptyline (ELAVIL) 25 mg tablet, Take 1 tablet (25 mg total) by mouth daily at bedtime, Disp: 30 tablet, Rfl: 5    aspirin 81 MG tablet, Take 81 mg by mouth daily, Disp: , Rfl:     Cholecalciferol (VITAMIN D3) 1000 units CAPS, Take by mouth, Disp: , Rfl:     co-enzyme Q-10 100 mg capsule, Take 100 mg by mouth, Disp: , Rfl:     cyanocobalamin (VITAMIN B-12) 100 mcg tablet, Take by mouth, Disp: , Rfl:     ezetimibe-simvastatin (VYTORIN) 10-80 MG per tablet, ezetimibe 10 mg-simvastatin 80 mg tablet, Disp: , Rfl:     fenofibrate (TRICOR) 145 mg tablet, Take 1 tablet every day by oral route , Disp: , Rfl:     glipiZIDE (GLUCOTROL XL) 10 mg 24 hr tablet, Take by mouth, Disp: , Rfl:     hydrOXYzine HCL (ATARAX) 25 mg tablet, Take 1 tablet (25 mg total) by mouth every 6 (six) hours as needed for itching, Disp: 30 tablet, Rfl: 0    ibuprofen (MOTRIN) 600 mg tablet, Take 600 mg by mouth every 6 (six) hours as needed, Disp: , Rfl: 0    lisinopril (ZESTRIL) 2 5 mg tablet, Take 2 5 mg by mouth, Disp: , Rfl:     MAGNESIUM PO, Take 400 mg by mouth, Disp: , Rfl:     melatonin 3 mg, Take 4 5 mg by mouth, Disp: , Rfl:     metFORMIN (GLUCOPHAGE) 1000 MG tablet, Take 1,000 mg by mouth, Disp: , Rfl:     metoprolol succinate (TOPROL-XL) 50 mg 24 hr tablet, Take 50 mg by mouth, Disp: , Rfl:     multivitamin (THERAGRAN) TABS, Take 1 tablet by mouth, Disp: , Rfl:     simvastatin (ZOCOR) 80 mg tablet, Take 40 mg by mouth, Disp: , Rfl:     Patient Active Problem List   Diagnosis    [facility]ES [facility]acute [facility]) ([facility] [address] [facility]    Anemia    Anxiety    Back pain    Coronary atherosclerosis    Diverticulosis of colon    BPH with urinary obstruction    Essential hypertension    Fracture of C1 vertebra, closed ([facility] [address] )    Glenohumeral arthritis, right    Hearing loss    History of disease of blood and blood-forming organs    History of renal stone    Insomnia    Irritable bowel syndrome    Ischemic cardiomyopathy    IVH (intraventricular hemorrhage) (HCC)    Lipid disorder    Renal cyst    Proteinuria    Sacral fracture (HCC)    Type 2 diabetes mellitus with diabetic nephropathy (HCC)    Vitamin D deficiency    Hematuria       Objective:  Right Hand Exam     Other   Erythema: absent  Sensation: normal    Comments:  Swelling and ttp 5th PIP with no laxity or deformity  Painful limited flexion with full ext of PIP            Physical Exam      Neurologic Exam    Procedures    I have personally reviewed pertinent films in PACS  and I have personally reviewed the written report of the pertinent studies

## 2018-12-10 ENCOUNTER — TRANSCRIBE ORDERS (OUTPATIENT)
Dept: ADMINISTRATIVE | Facility: HOSPITAL | Age: 73
End: 2018-12-10

## 2018-12-10 DIAGNOSIS — R40.0 SLEEPINESS: Primary | ICD-10-CM

## 2018-12-11 ENCOUNTER — OFFICE VISIT (OUTPATIENT)
Dept: SLEEP CENTER | Facility: CLINIC | Age: 73
End: 2018-12-11
Payer: COMMERCIAL

## 2018-12-11 VITALS
SYSTOLIC BLOOD PRESSURE: 124 MMHG | WEIGHT: 133.2 LBS | OXYGEN SATURATION: 96 % | BODY MASS INDEX: 22.19 KG/M2 | HEIGHT: 65 IN | HEART RATE: 76 BPM | DIASTOLIC BLOOD PRESSURE: 78 MMHG

## 2018-12-11 DIAGNOSIS — G47.8 INSOMNIA DISORDER, WITH OTHER SLEEP DISORDER, RECURRENT: Primary | ICD-10-CM

## 2018-12-11 DIAGNOSIS — F41.9 ANXIETY: ICD-10-CM

## 2018-12-11 DIAGNOSIS — G47.00 INSOMNIA DISORDER, WITH OTHER SLEEP DISORDER, RECURRENT: Primary | ICD-10-CM

## 2018-12-11 DIAGNOSIS — E11.42 DIABETIC POLYNEUROPATHY ASSOCIATED WITH TYPE 2 DIABETES MELLITUS (HCC): ICD-10-CM

## 2018-12-11 DIAGNOSIS — Z72.821 INADEQUATE SLEEP HYGIENE: ICD-10-CM

## 2018-12-11 PROCEDURE — 99204 OFFICE O/P NEW MOD 45 MIN: CPT | Performed by: PSYCHIATRY & NEUROLOGY

## 2018-12-11 PROCEDURE — 4040F PNEUMOC VAC/ADMIN/RCVD: CPT | Performed by: PSYCHIATRY & NEUROLOGY

## 2018-12-11 NOTE — PATIENT INSTRUCTIONS
1   Restart lorazepam 0 5 mg approximately 30 min before bedtime  2  Attempt to shortened time in bed to approximately 7 hr  3  Call with a progress report in 3 weeks  4  Return in 3 months for routine re-evaluation   5    Continue melatonin at 4 5 mg nightly before bed

## 2018-12-11 NOTE — PROGRESS NOTES
Review of Systems      Genitourinary need to urinate more than twice a night   Cardiology none   Gastrointestinal none   Neurology numbness/tingling of an extremity   Constitutional none   Integumentary none   Psychiatry none   Musculoskeletal none   Pulmonary none   ENT none   Endocrine none   Hematological none
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
## 2018-12-12 ENCOUNTER — TELEPHONE (OUTPATIENT)
Dept: SLEEP CENTER | Facility: CLINIC | Age: 73
End: 2018-12-12

## 2018-12-12 DIAGNOSIS — F41.9 INSOMNIA SECONDARY TO ANXIETY: Primary | ICD-10-CM

## 2018-12-12 DIAGNOSIS — F51.05 INSOMNIA SECONDARY TO ANXIETY: Primary | ICD-10-CM

## 2018-12-12 RX ORDER — LORAZEPAM 0.5 MG/1
0.5 TABLET ORAL
Status: DISCONTINUED | OUTPATIENT
Start: 2018-12-12 | End: 2019-01-09

## 2018-12-13 DIAGNOSIS — G47.00 INSOMNIA DISORDER, WITH OTHER SLEEP DISORDER, RECURRENT: Primary | ICD-10-CM

## 2018-12-13 DIAGNOSIS — G47.8 INSOMNIA DISORDER, WITH OTHER SLEEP DISORDER, RECURRENT: Primary | ICD-10-CM

## 2018-12-13 RX ORDER — LORAZEPAM 0.5 MG/1
0.5 TABLET ORAL
Qty: 30 TABLET | Refills: 5 | Status: SHIPPED | OUTPATIENT
Start: 2018-12-13 | End: 2019-06-17 | Stop reason: SDUPTHER

## 2018-12-13 NOTE — TELEPHONE ENCOUNTER
LM for pt that script was sent to [facility] and to call with any other questions or concerns

## 2018-12-14 ENCOUNTER — TELEPHONE (OUTPATIENT)
Dept: SLEEP CENTER | Facility: CLINIC | Age: 73
End: 2018-12-14

## 2018-12-21 ENCOUNTER — OFFICE VISIT (OUTPATIENT)
Dept: OBGYN CLINIC | Facility: MEDICAL CENTER | Age: 73
End: 2018-12-21
Payer: COMMERCIAL

## 2018-12-21 VITALS
HEART RATE: 75 BPM | SYSTOLIC BLOOD PRESSURE: 153 MMHG | WEIGHT: 132 LBS | DIASTOLIC BLOOD PRESSURE: 88 MMHG | HEIGHT: 64 IN | BODY MASS INDEX: 22.53 KG/M2

## 2018-12-21 DIAGNOSIS — S63.289D DISLOCATION OF PROXIMAL INTERPHALANGEAL JOINT OF FINGER, SUBSEQUENT ENCOUNTER: Primary | ICD-10-CM

## 2018-12-21 PROCEDURE — 99213 OFFICE O/P EST LOW 20 MIN: CPT | Performed by: EMERGENCY MEDICINE

## 2018-12-21 NOTE — PROGRESS NOTES
Assessment/Plan:    Diagnoses and all orders for this visit:    Dislocation of proximal interphalangeal joint of finger, subsequent encounter    Wean from freddy taping, recommended putty or stress ball     Return in about 4 weeks (around 1/18/2019)  Chief Complaint:   f/u finger D/L    Subjective:   Patient ID: Gita Haji is a 68 y o  male  Patient returns with improvement in symptoms, using coban for buddytaping    Initial note: NP presents for right 5th PIP D/L reduced in Urgent Care and placed in splint  Review of Systems    The following portions of the patient's chart were reviewed and updated as appropriate:    Allergy:  No Known Allergies      Past Medical History:   Diagnosis Date    Anxiety     Arthritis     BPH with obstruction/lower urinary tract symptoms     Diabetes mellitus (HCC)     Hypertension     Renal calculi     Ureter, calculus     Urinary stone        Past Surgical History:   Procedure Laterality Date    CARDIAC SURGERY      CHOLECYSTECTOMY      CYSTOSCOPY W/ URETEROSCOPY         Social History     Social History    Marital status: Single     Spouse name: N/A    Number of children: N/A    Years of education: N/A     Occupational History    retired      Social History Main Topics    Smoking status: Never Smoker    Smokeless tobacco: Never Used    Alcohol use Yes    Drug use: No    Sexual activity: Not on file     Other Topics Concern    Not on file     Social History Narrative    Caffeine use        Family History   Problem Relation Age of Onset    Cancer Mother     Arthritis Father     Heart disease Father         cardiac disorder    Cancer Family     Arthritis Family        Medications:    Current Outpatient Prescriptions:     amitriptyline (ELAVIL) 25 mg tablet, Take 1 tablet (25 mg total) by mouth daily at bedtime, Disp: 30 tablet, Rfl: 5    aspirin 81 MG tablet, Take 81 mg by mouth daily, Disp: , Rfl:     Cholecalciferol (VITAMIN D3) 1000 units CAPS, Take by mouth, Disp: , Rfl:     co-enzyme Q-10 100 mg capsule, Take 100 mg by mouth, Disp: , Rfl:     cyanocobalamin (VITAMIN B-12) 100 mcg tablet, Take by mouth, Disp: , Rfl:     ezetimibe-simvastatin (VYTORIN) 10-80 MG per tablet, ezetimibe 10 mg-simvastatin 80 mg tablet, Disp: , Rfl:     fenofibrate (TRICOR) 145 mg tablet, Take 1 tablet every day by oral route , Disp: , Rfl:     glipiZIDE (GLUCOTROL XL) 10 mg 24 hr tablet, Take by mouth, Disp: , Rfl:     hydrOXYzine HCL (ATARAX) 25 mg tablet, Take 1 tablet (25 mg total) by mouth every 6 (six) hours as needed for itching, Disp: 30 tablet, Rfl: 0    ibuprofen (MOTRIN) 600 mg tablet, Take 600 mg by mouth every 6 (six) hours as needed, Disp: , Rfl: 0    lisinopril (ZESTRIL) 2 5 mg tablet, Take 2 5 mg by mouth, Disp: , Rfl:     LORazepam (ATIVAN) 0 5 mg tablet, Take 1 tablet (0 5 mg total) by mouth daily at bedtime, Disp: 30 tablet, Rfl: 5    MAGNESIUM PO, Take 400 mg by mouth, Disp: , Rfl:     melatonin 3 mg, Take 4 5 mg by mouth, Disp: , Rfl:     metFORMIN (GLUCOPHAGE) 1000 MG tablet, Take 1,000 mg by mouth, Disp: , Rfl:     metoprolol succinate (TOPROL-XL) 50 mg 24 hr tablet, Take 50 mg by mouth, Disp: , Rfl:     multivitamin (THERAGRAN) TABS, Take 1 tablet by mouth, Disp: , Rfl:     simvastatin (ZOCOR) 80 mg tablet, Take 40 mg by mouth, Disp: , Rfl:     Current Facility-Administered Medications:     LORazepam (ATIVAN) tablet 0 5 mg, 0 5 mg, Oral, HS, Greg Bymaryam, DO    Patient Active Problem List   Diagnosis    [facility]ES [facility]acute kidney injury) ([facility] [address] [facility]    Anemia    Anxiety    Back pain    Coronary atherosclerosis    Diverticulosis of colon    BPH with urinary obstruction    Essential hypertension    Fracture of C1 vertebra, closed (HCC)    Glenohumeral arthritis, right    Hearing loss    History of disease of blood and blood-forming organs    History of renal stone    Insomnia secondary to anxiety    Irritable bowel syndrome  Ischemic cardiomyopathy    IVH (intraventricular hemorrhage) (HCC)    Lipid disorder    Renal cyst    Proteinuria    Sacral fracture (HCC)    Type 2 diabetes mellitus with diabetic nephropathy (HCC)    Vitamin D deficiency    Hematuria    Inadequate sleep hygiene       Objective:  Left Hand Exam     Other   Erythema: absent  Sensation: normal    Comments:  No deformity  Near full flexion with mild pain, full extension            Physical Exam      Neurologic Exam    Procedures    There are no pertinent studies obtained with regards to today's office visit

## 2019-01-07 ENCOUNTER — APPOINTMENT (OUTPATIENT)
Dept: RADIOLOGY | Facility: MEDICAL CENTER | Age: 74
End: 2019-01-07
Payer: COMMERCIAL

## 2019-01-07 ENCOUNTER — OFFICE VISIT (OUTPATIENT)
Dept: URGENT CARE | Facility: MEDICAL CENTER | Age: 74
End: 2019-01-07
Payer: COMMERCIAL

## 2019-01-07 VITALS
SYSTOLIC BLOOD PRESSURE: 130 MMHG | HEIGHT: 64 IN | WEIGHT: 132 LBS | HEART RATE: 81 BPM | OXYGEN SATURATION: 97 % | RESPIRATION RATE: 20 BRPM | TEMPERATURE: 97 F | BODY MASS INDEX: 22.53 KG/M2 | DIASTOLIC BLOOD PRESSURE: 82 MMHG

## 2019-01-07 DIAGNOSIS — R07.81 RIB PAIN ON LEFT SIDE: ICD-10-CM

## 2019-01-07 DIAGNOSIS — R07.81 RIB PAIN ON LEFT SIDE: Primary | ICD-10-CM

## 2019-01-07 DIAGNOSIS — S22.42XA CLOSED FRACTURE OF MULTIPLE RIBS OF LEFT SIDE, INITIAL ENCOUNTER: ICD-10-CM

## 2019-01-07 PROCEDURE — 93005 ELECTROCARDIOGRAM TRACING: CPT | Performed by: NURSE PRACTITIONER

## 2019-01-07 PROCEDURE — 71101 X-RAY EXAM UNILAT RIBS/CHEST: CPT

## 2019-01-07 PROCEDURE — S9083 URGENT CARE CENTER GLOBAL: HCPCS | Performed by: NURSE PRACTITIONER

## 2019-01-07 PROCEDURE — 99213 OFFICE O/P EST LOW 20 MIN: CPT | Performed by: NURSE PRACTITIONER

## 2019-01-07 NOTE — PROGRESS NOTES
[facility] [employer] [facility]        NAME: Benny Perry is a 68 y o  male  : 1945    MRN: [id number]  DATE: 2019  TIME: 11:47 AM    Assessment and Plan   Rib pain on left side [R07 81]  1  Rib pain on left side  ECG 12 lead    XR ribs left w pa chest min 3 views   2  Closed fracture of multiple ribs of left side, initial encounter           Patient Instructions   In office x ray of L ribs interpreted as Old and/or healing fractures of the left 5th and 6th ribs, confirmed by radiology  In office EKG interpreted as NSR  Gentle stretches, gentle massage  NSAID's as directed  Apply heat locally  Elevate extremities when at rest    Advance activity as tolerated  Utilize a pillow to support splinting when moving, coughing, sneezing  Follow up with PCP as scheduled or go to nearest ED if any signs of distress  Follow up with PCP in 3-5 days  Proceed to  ER if symptoms worsen  Chief Complaint     Chief Complaint   Patient presents with    Rib Pain     Patient here with left sided rib pain for 3 weeks  Denies injury  History of Present Illness       Patient presents in office with L sided rib pain x 3 weeks  Starts to the left of nipple and radiates into upper back  Has been getting worse  Reports it is worse at night when resting  Stents placed in  and   Has taken aleve with no improvement  Got up in the middle of the night, tripped over wash basket and dislocated R fifth finger around 3 weeks ago, unsure if he injured ribs at that point  No seasonal allergies  No asthma  Review of Systems   Review of Systems   Constitutional: Negative for chills and fever  Respiratory: Negative  Cardiovascular: Negative  Gastrointestinal: Negative  Musculoskeletal: Positive for myalgias  Skin: Negative for rash           Current Medications       Current Outpatient Prescriptions:     amitriptyline (ELAVIL) 25 mg tablet, Take 1 tablet (25 mg total) by mouth daily at bedtime, Disp: 30 tablet, Rfl: 5    aspirin 81 MG tablet, Take 81 mg by mouth daily, Disp: , Rfl:     Cholecalciferol (VITAMIN D3) 1000 units CAPS, Take by mouth, Disp: , Rfl:     co-enzyme Q-10 100 mg capsule, Take 100 mg by mouth, Disp: , Rfl:     cyanocobalamin (VITAMIN B-12) 100 mcg tablet, Take by mouth, Disp: , Rfl:     ezetimibe-simvastatin (VYTORIN) 10-80 MG per tablet, ezetimibe 10 mg-simvastatin 80 mg tablet, Disp: , Rfl:     fenofibrate (TRICOR) 145 mg tablet, Take 1 tablet every day by oral route , Disp: , Rfl:     glipiZIDE (GLUCOTROL XL) 10 mg 24 hr tablet, Take by mouth, Disp: , Rfl:     hydrOXYzine HCL (ATARAX) 25 mg tablet, Take 1 tablet (25 mg total) by mouth every 6 (six) hours as needed for itching, Disp: 30 tablet, Rfl: 0    ibuprofen (MOTRIN) 600 mg tablet, Take 600 mg by mouth every 6 (six) hours as needed, Disp: , Rfl: 0    lisinopril (ZESTRIL) 2 5 mg tablet, Take 2 5 mg by mouth, Disp: , Rfl:     LORazepam (ATIVAN) 0 5 mg tablet, Take 1 tablet (0 5 mg total) by mouth daily at bedtime, Disp: 30 tablet, Rfl: 5    MAGNESIUM PO, Take 400 mg by mouth, Disp: , Rfl:     melatonin 3 mg, Take 4 5 mg by mouth, Disp: , Rfl:     metFORMIN (GLUCOPHAGE) 1000 MG tablet, Take 1,000 mg by mouth, Disp: , Rfl:     metoprolol succinate (TOPROL-XL) 50 mg 24 hr tablet, Take 50 mg by mouth, Disp: , Rfl:     multivitamin (THERAGRAN) TABS, Take 1 tablet by mouth, Disp: , Rfl:     simvastatin (ZOCOR) 80 mg tablet, Take 40 mg by mouth, Disp: , Rfl:     Current Facility-Administered Medications:     LORazepam (ATIVAN) tablet 0 5 mg, 0 5 mg, Oral, DANTE, Anusha Elizondo, DO    Current Allergies     Allergies as of 01/07/2019    (No Known Allergies)            The following portions of the patient's history were reviewed and updated as appropriate: allergies, current medications, past family history, past medical history, past social history, past surgical history and problem list      Past Medical History:   Diagnosis Date    Anxiety     Arthritis     BPH with obstruction/lower urinary tract symptoms     Diabetes mellitus ([address] )     Hypertension     Renal calculi     Ureter, calculus     Urinary stone        Past Surgical History:   Procedure Laterality Date    CARDIAC SURGERY      CHOLECYSTECTOMY      CYSTOSCOPY W/ URETEROSCOPY         Family History   Problem Relation Age of Onset    Cancer Mother     Arthritis Father     Heart disease Father         cardiac disorder    Cancer Family     Arthritis Family          Medications have been verified  Objective   /82   Pulse 81   Temp (!) 97 °F (36 1 °C) (Tympanic)   Resp 20   Ht 5' 4" (1 626 m)   Wt 59 9 kg (132 lb)   SpO2 97%   BMI 22 66 kg/m²        Physical Exam     Physical Exam   Constitutional: He is oriented to person, place, and time  He appears well-developed and well-nourished  No distress  HENT:   Head: Normocephalic and atraumatic  Eyes: Pupils are equal, round, and reactive to light  Cardiovascular: Normal rate, regular rhythm, normal heart sounds and intact distal pulses  Pulmonary/Chest: Effort normal and breath sounds normal  He exhibits no mass, no tenderness and no bony tenderness  Musculoskeletal: Normal range of motion  He exhibits no edema or tenderness  Neurological: He is alert and oriented to person, place, and time  Skin: Skin is warm and dry  No rash noted  He is not diaphoretic  Psychiatric: He has a normal mood and affect  His behavior is normal    Nursing note and vitals reviewed  I have reviewed the student's history and physical, I have personally examined the patient and agree with the above stated findings and plan

## 2019-01-07 NOTE — PATIENT INSTRUCTIONS
Gentle stretches, gentle massage  NSAID's as directed  Apply heat locally  Advance activity as tolerated  Utilize a pillow to support splinting when moving, coughing, sneezing  Follow up with PCP as scheduled or go to nearest ED if any signs of distress  Rib Contusion   WHAT YOU NEED TO KNOW:   A rib contusion is a bruise on one or more of your ribs  DISCHARGE INSTRUCTIONS:   Return to the emergency department if:   · You have increased chest pain  · You have shortness of breath  · You start to cough up blood  · Your pain does not improve with pain medicine  Contact your healthcare provider if:   · You have a cough  · You have a fever  · You have questions or concerns about your condition or care  Medicines: You may need any of the following:  · NSAIDs , such as ibuprofen, help decrease swelling, pain, and fever  This medicine is available with or without a doctor's order  NSAIDs can cause stomach bleeding or kidney problems in certain people  If you take blood thinner medicine, always ask if NSAIDs are safe for you  Always read the medicine label and follow directions  Do not give these medicines to children under 10months of age without direction from your child's healthcare provider  · Prescription pain medicine  may be given  Ask how to take this medicine safely  · Take your medicine as directed  Contact your healthcare provider if you think your medicine is not helping or if you have side effects  Tell him of her if you are allergic to any medicine  Keep a list of the medicines, vitamins, and herbs you take  Include the amounts, and when and why you take them  Bring the list or the pill bottles to follow-up visits  Carry your medicine list with you in case of an emergency  Deep breathing:   · To help prevent pneumonia, take 10 deep breaths every hour, even when you wake up during the night   Brace your ribs with your hands or a pillow while you take deep breaths or cough  This will help decrease your pain  · You may need to use an incentive spirometer to help you take deeper breaths  Put the plastic piece into your mouth and take a very deep breath  Hold your breath as long as you can  Then let out your breath  Do this 10 times in a row every hour while you are awake  Rest:  Rest your ribs to decrease swelling and allow the injury to heal faster  Avoid activities that may cause more pain or damage to your ribs  As your pain decreases, begin movements slowly  Ice:  Ice helps decrease swelling and pain  Ice may also help prevent tissue damage  Use an ice pack or put crushed ice in a plastic bag  Cover it with a towel and place it on your bruised area for 15 to 20 minutes every hour as directed  Follow up with your healthcare provider as directed:  Write down your questions so you remember to ask them during your visits  © 2017 [employer]  Information is for End User's use only and may not be sold, redistributed or otherwise used for commercial purposes  All illustrations and images included in CareNotes® are the copyrighted property of [employer] , [employer]  or Dylan Solorzano  The above information is an  only  It is not intended as medical advice for individual conditions or treatments  Talk to your doctor, nurse or pharmacist before following any medical regimen to see if it is safe and effective for you

## 2019-01-09 ENCOUNTER — OFFICE VISIT (OUTPATIENT)
Dept: FAMILY MEDICINE CLINIC | Facility: CLINIC | Age: 74
End: 2019-01-09
Payer: COMMERCIAL

## 2019-01-09 VITALS
HEIGHT: 64 IN | OXYGEN SATURATION: 98 % | BODY MASS INDEX: 23.22 KG/M2 | TEMPERATURE: 97.3 F | HEART RATE: 84 BPM | SYSTOLIC BLOOD PRESSURE: 140 MMHG | WEIGHT: 136 LBS | DIASTOLIC BLOOD PRESSURE: 70 MMHG | RESPIRATION RATE: 18 BRPM

## 2019-01-09 DIAGNOSIS — S22.42XD CLOSED FRACTURE OF MULTIPLE RIBS OF LEFT SIDE WITH ROUTINE HEALING, SUBSEQUENT ENCOUNTER: Primary | ICD-10-CM

## 2019-01-09 PROBLEM — S22.42XA MULTIPLE CLOSED FRACTURES OF RIBS OF LEFT SIDE: Status: ACTIVE | Noted: 2019-01-09

## 2019-01-09 PROCEDURE — 99213 OFFICE O/P EST LOW 20 MIN: CPT | Performed by: PHYSICIAN ASSISTANT

## 2019-01-09 PROCEDURE — 3725F SCREEN DEPRESSION PERFORMED: CPT | Performed by: PHYSICIAN ASSISTANT

## 2019-01-09 RX ORDER — TRAMADOL HYDROCHLORIDE 50 MG/1
50 TABLET ORAL
Qty: 30 TABLET | Refills: 0 | Status: SHIPPED | OUTPATIENT
Start: 2019-01-09 | End: 2019-03-25 | Stop reason: ALTCHOICE

## 2019-01-09 NOTE — ASSESSMENT & PLAN NOTE
Tramadol given for nighttime use only as this is when pain is worse, take tylenol during the day  Instructed not to take ativan with tramadol due to risk of sedation and resp depression

## 2019-01-09 NOTE — PROGRESS NOTES
Assessment/Plan:    Multiple closed fractures of ribs of left side  Tramadol given for nighttime use only as this is when pain is worse, take tylenol during the day  Instructed not to take ativan with tramadol due to risk of sedation and resp depression  Diagnoses and all orders for this visit:    Closed fracture of multiple ribs of left side with routine healing, subsequent encounter  -     traMADol (ULTRAM) 50 mg tablet; Take 1 tablet (50 mg total) by mouth daily at bedtime as needed for moderate pain          Subjective:      Patient ID: Trish Murray is a 68 y o  male   68-year-old male presents complaining of left rib pain  He was seen 2 days ago at urgent care for the same, x-ray revealed rib fractures  He had undergone a fall without head injury before he hit his side against a counter top  Pain is worse when he is trying to sleep and has prevented him from sleeping  Less problematic when he is awake, rates it a 6/10 at rest   Still breathing well  No fever or chills  Has been using Tylenol, Advil, warm compresses, splinting at night with a pillow without relief  The following portions of the patient's history were reviewed and updated as appropriate: allergies, current medications, past family history, past medical history, past social history, past surgical history and problem list     Review of Systems   Constitutional: Negative for chills and fever  Respiratory: Negative for cough and shortness of breath  Cardiovascular: Positive for chest pain  Musculoskeletal: Positive for back pain  Objective:      /70 (BP Location: Left arm, Patient Position: Sitting, Cuff Size: Adult)   Pulse 84   Temp (!) 97 3 °F (36 3 °C) (Tympanic)   Resp 18   Ht 5' 4" (1 626 m)   Wt 61 7 kg (136 lb)   SpO2 98%   BMI 23 34 kg/m²          Physical Exam   Constitutional: He appears well-developed and well-nourished  No distress     Cardiovascular: Normal rate, regular rhythm and normal heart sounds  Pulmonary/Chest: Effort normal and breath sounds normal        Skin: He is not diaphoretic

## 2019-01-10 ENCOUNTER — TELEPHONE (OUTPATIENT)
Dept: OBGYN CLINIC | Facility: HOSPITAL | Age: 74
End: 2019-01-10

## 2019-01-10 LAB
ATRIAL RATE: 74 BPM
P AXIS: 48 DEGREES
PR INTERVAL: 204 MS
QRS AXIS: 58 DEGREES
QRSD INTERVAL: 104 MS
QT INTERVAL: 408 MS
QTC INTERVAL: 452 MS
T WAVE AXIS: -1 DEGREES
VENTRICULAR RATE: 74 BPM

## 2019-01-10 PROCEDURE — 93010 ELECTROCARDIOGRAM REPORT: CPT | Performed by: INTERNAL MEDICINE

## 2019-01-15 ENCOUNTER — OFFICE VISIT (OUTPATIENT)
Dept: FAMILY MEDICINE CLINIC | Facility: CLINIC | Age: 74
End: 2019-01-15
Payer: COMMERCIAL

## 2019-01-15 VITALS
HEART RATE: 84 BPM | WEIGHT: 134 LBS | HEIGHT: 64 IN | DIASTOLIC BLOOD PRESSURE: 78 MMHG | OXYGEN SATURATION: 97 % | TEMPERATURE: 98.4 F | SYSTOLIC BLOOD PRESSURE: 130 MMHG | BODY MASS INDEX: 22.88 KG/M2

## 2019-01-15 DIAGNOSIS — F51.05 INSOMNIA SECONDARY TO ANXIETY: ICD-10-CM

## 2019-01-15 DIAGNOSIS — I10 ESSENTIAL HYPERTENSION: ICD-10-CM

## 2019-01-15 DIAGNOSIS — E11.42 TYPE 2 DIABETES MELLITUS WITH DIABETIC POLYNEUROPATHY, WITHOUT LONG-TERM CURRENT USE OF INSULIN (HCC): Primary | ICD-10-CM

## 2019-01-15 DIAGNOSIS — F41.9 INSOMNIA SECONDARY TO ANXIETY: ICD-10-CM

## 2019-01-15 DIAGNOSIS — F41.9 ANXIETY: ICD-10-CM

## 2019-01-15 PROCEDURE — 1036F TOBACCO NON-USER: CPT | Performed by: PHYSICIAN ASSISTANT

## 2019-01-15 PROCEDURE — 1160F RVW MEDS BY RX/DR IN RCRD: CPT | Performed by: PHYSICIAN ASSISTANT

## 2019-01-15 PROCEDURE — 3078F DIAST BP <80 MM HG: CPT | Performed by: PHYSICIAN ASSISTANT

## 2019-01-15 PROCEDURE — 3008F BODY MASS INDEX DOCD: CPT | Performed by: PHYSICIAN ASSISTANT

## 2019-01-15 PROCEDURE — 99214 OFFICE O/P EST MOD 30 MIN: CPT | Performed by: PHYSICIAN ASSISTANT

## 2019-01-15 PROCEDURE — 3075F SYST BP GE 130 - 139MM HG: CPT | Performed by: PHYSICIAN ASSISTANT

## 2019-01-15 NOTE — PROGRESS NOTES
Assessment/Plan:    Type 2 diabetes mellitus with diabetic polyneuropathy ([facility] [address] )  Lab Results   Component Value Date    HGBA1C 6 1 11/12/2018     Well controlled, however pt reports painful neuropathy  Had been on gabapentin in the past but reports suboptimal results  Would like to be referred to specialist with medical marijuana rx rights as this would help his comorbid anxiety and insomnia as well  Dr Rubi salazar given    Insomnia secondary to anxiety  Did not tolerate amitriptyline, trazodone, ambien    Has been seen by both psych and sleep medicine  Psych had d/c ativan due to age and risk of resp depression, came here requesting same but was denied by me, then saw Dr Jesse Vaughn who restarted the medication  May be helped     Essential hypertension  Well controlled at 130/70  Continue current management  Anxiety  Continue follow-up with Psychiatry at [place]  Diagnoses and all orders for this visit:    Type 2 diabetes mellitus with diabetic polyneuropathy, without long-term current use of insulin (HCC)    Insomnia secondary to anxiety    Essential hypertension    Anxiety          Subjective:      Patient ID: Adonay Nguyễn is a 68 y o  male  46-year-old male with a history of anxiety, type 2 diabetes, CAD, hld, hypertension, insomnia presents for consultation of medical marijuana  Patient has a history of type 2 diabetes which has been very well controlled on metformin 1000 mg b i d  As well as glipizide 10 mg  Most recent HbA1c 6 1  Denies hypoglycemia, vision changes, polyuria or polydipsia but does have neuropathy  He reports that he has been on gabapentin in the past but does not remember if it was ineffective or if it was because of side effects that he stop this  Reports he will occasionally have a pins and needle sensation down his legs, sometimes is more numb sometimes is more painful  He believes this is contributing to his insomnia    Patient is a significant medical history of anxiety as well as insomnia  Has been seen by Psychiatry through [facility]  Originally seen by me for complaints of insomnia  He had stated that Ativan had worked well for him in the past   He reports trazodone, amitriptyline, Ambien all cause negative side effects for him and Atarax was not covered for this purpose by his insurance so he refused to take it despite the fact that it was not that expensive  I have seen through the PDMP that he had previously been prescribed Ativan by psychiatrist, I called the office to see why this was discontinued  They reported due to his age and risk of respiratory depression it was attempted to be weaned off of this  I told him I would not be prescribing this for him and that he should not be seeing multiple providers for the same reason  He was then seen by Dr Vielka Mijares in Sleep Medicine who represcribed this for him  Hypertension has been well controlled on lisinopril 2 5 mg, metoprolol succinate 50 mg  Blood pressure 130/78 today  Denies cough, lightheadedness, chest pain, vision changes or headache  The following portions of the patient's history were reviewed and updated as appropriate: allergies, current medications, past family history, past medical history, past social history, past surgical history and problem list     Review of Systems   Constitutional: Negative for diaphoresis, fatigue and fever  HENT: Negative for congestion, ear pain, hearing loss, postnasal drip, rhinorrhea and sore throat  Eyes: Negative for pain, redness and visual disturbance  Respiratory: Negative for cough, shortness of breath and wheezing  Cardiovascular: Negative for chest pain, palpitations and leg swelling  Gastrointestinal: Negative for anal bleeding, constipation, diarrhea, nausea and vomiting  Endocrine: Negative for polydipsia and polyuria  Genitourinary: Negative for dysuria, flank pain and hematuria     Musculoskeletal: Negative for arthralgias, back pain, joint swelling and myalgias  Skin: Negative for pallor, rash and wound  Neurological: Negative for dizziness, syncope, numbness and headaches  Hematological: Negative for adenopathy  Does not bruise/bleed easily  Psychiatric/Behavioral: Positive for sleep disturbance  Negative for dysphoric mood and suicidal ideas  The patient is nervous/anxious  Objective:      /78   Pulse 84   Temp 98 4 °F (36 9 °C)   Ht 5' 4" (1 626 m)   Wt 60 8 kg (134 lb)   SpO2 97%   BMI 23 00 kg/m²          Physical Exam   Constitutional: He is oriented to person, place, and time  He appears well-developed and well-nourished  No distress  HENT:   Head: Normocephalic and atraumatic  Mouth/Throat: Oropharynx is clear and moist    Eyes: Pupils are equal, round, and reactive to light  Conjunctivae and EOM are normal  Right eye exhibits no discharge  Left eye exhibits no discharge  No scleral icterus  Neck: Normal range of motion  Neck supple  No thyromegaly present  Cardiovascular: Normal rate, regular rhythm and normal heart sounds  Exam reveals no gallop and no friction rub  No murmur heard  Pulmonary/Chest: Effort normal and breath sounds normal  No respiratory distress  He has no wheezes  He has no rales  Musculoskeletal: Normal range of motion  He exhibits no edema  Lymphadenopathy:     He has no cervical adenopathy  Neurological: He is alert and oriented to person, place, and time  No cranial nerve deficit  Skin: Skin is warm and dry  No rash noted  He is not diaphoretic  No erythema  No pallor

## 2019-01-15 NOTE — ASSESSMENT & PLAN NOTE
Did not tolerate amitriptyline, trazodone, ambien    Has been seen by both psych and sleep medicine  Psych had d/c ativan due to age and risk of resp depression, came here requesting same but was denied by me, then saw Dr Phil Kamara who restarted the medication   May be helped

## 2019-01-15 NOTE — ASSESSMENT & PLAN NOTE
Lab Results   Component Value Date    HGBA1C 6 1 11/12/2018     Well controlled, however pt reports painful neuropathy  Had been on gabapentin in the past but reports suboptimal results  Would like to be referred to specialist with medical marijuana rx rights as this would help his comorbid anxiety and insomnia as well   Dr Any Cox card given

## 2019-03-25 ENCOUNTER — OFFICE VISIT (OUTPATIENT)
Dept: FAMILY MEDICINE CLINIC | Facility: CLINIC | Age: 74
End: 2019-03-25
Payer: COMMERCIAL

## 2019-03-25 VITALS
RESPIRATION RATE: 18 BRPM | SYSTOLIC BLOOD PRESSURE: 120 MMHG | WEIGHT: 134 LBS | DIASTOLIC BLOOD PRESSURE: 80 MMHG | HEIGHT: 64 IN | TEMPERATURE: 98.8 F | HEART RATE: 89 BPM | BODY MASS INDEX: 22.88 KG/M2 | OXYGEN SATURATION: 98 %

## 2019-03-25 DIAGNOSIS — I65.23 BILATERAL CAROTID ARTERY STENOSIS: ICD-10-CM

## 2019-03-25 DIAGNOSIS — E78.9 LIPID DISORDER: ICD-10-CM

## 2019-03-25 DIAGNOSIS — R80.9 PROTEINURIA, UNSPECIFIED TYPE: ICD-10-CM

## 2019-03-25 DIAGNOSIS — E11.42 TYPE 2 DIABETES MELLITUS WITH DIABETIC POLYNEUROPATHY, WITHOUT LONG-TERM CURRENT USE OF INSULIN (HCC): ICD-10-CM

## 2019-03-25 DIAGNOSIS — R79.89 LOW SERUM URIC ACID FOR AGE: ICD-10-CM

## 2019-03-25 DIAGNOSIS — D72.829 LEUKOCYTOSIS, UNSPECIFIED TYPE: ICD-10-CM

## 2019-03-25 DIAGNOSIS — E55.9 VITAMIN D DEFICIENCY: ICD-10-CM

## 2019-03-25 DIAGNOSIS — R97.20 PSA ELEVATION: ICD-10-CM

## 2019-03-25 DIAGNOSIS — J06.9 UPPER RESPIRATORY TRACT INFECTION, UNSPECIFIED TYPE: Primary | ICD-10-CM

## 2019-03-25 LAB — S PYO AG THROAT QL: NEGATIVE

## 2019-03-25 PROCEDURE — 99214 OFFICE O/P EST MOD 30 MIN: CPT | Performed by: FAMILY MEDICINE

## 2019-03-25 PROCEDURE — 3008F BODY MASS INDEX DOCD: CPT | Performed by: FAMILY MEDICINE

## 2019-03-25 PROCEDURE — 87880 STREP A ASSAY W/OPTIC: CPT | Performed by: FAMILY MEDICINE

## 2019-03-25 PROCEDURE — 3066F NEPHROPATHY DOC TX: CPT | Performed by: FAMILY MEDICINE

## 2019-03-25 PROCEDURE — 1160F RVW MEDS BY RX/DR IN RCRD: CPT | Performed by: FAMILY MEDICINE

## 2019-03-25 RX ORDER — FLUTICASONE PROPIONATE 50 MCG
1 SPRAY, SUSPENSION (ML) NASAL DAILY
COMMUNITY

## 2019-03-25 RX ORDER — FLUTICASONE PROPIONATE 0.5 MG/ML
LOTION TOPICAL DAILY
COMMUNITY
End: 2019-10-15 | Stop reason: ALTCHOICE

## 2019-03-25 NOTE — PROGRESS NOTES
Assessment/Plan:          Diagnoses and all orders for this visit:    Upper respiratory tract infection, unspecified type  Comments:  Improving  Advised patient to rest, drink warm liquids  To call if not better or worse  Orders:  -     POCT rapid strepA  -     Lipid Panel with Direct LDL reflex; Future    Type 2 diabetes mellitus with diabetic polyneuropathy, without long-term current use of insulin ([facility])  Comments: To follow with 1800 calorie diet  To see Ophthalmology for routine eye care  Orders:  -     Comprehensive metabolic panel; Future    Vitamin D deficiency  -     Vitamin D 25 hydroxy; Future    Proteinuria, unspecified type  -     Protein, urine, 24 hour; Future    Lipid disorder  Comments: Follow with low-fat    Leukocytosis, unspecified type  -     CBC and differential; Future    PSA elevation  Comments:  Patient follow with Dr Jaffe Prom    Low serum uric acid for age  -     Uric acid; Future    Bilateral carotid artery stenosis  Comments:  Patient follow with Cardiology    Other orders  -     fluticasone (CUTIVATE) 0 05 %; Apply topically daily  -     fluticasone (FLONASE) 50 mcg/act nasal spray; 1 spray into each nostril daily            Subjective:     Patient ID: Denise York is a 69 y o  male      Acute care  Cold symptoms  Patient stated this started with a cold symptoms about 3- 4 days ago  Complain of clear runny nose and slight nasal congestion   slight sore throat and hoarseness  andslight dry cough  Symptoms are moderate  Improving  Chronic medical problem   Elevated PSA  Patient stated he follow with Urology and he did have biopsy about 3 4 years ago  Diabetes  Denied polyuria or polydipsia he said he follow with the [facility]  Low uric acid  Denied arthralgia  Proteinuria  Denied edema  Low vitamin-D  Denied falling  Ischemic cardiomyopathy  Denied chest pain or shortness of breath he follow with Cardiology    Had carotid ultrasound done with them   Had colonoscopy done recently a GI group at [facility]        Test results  Labs  Done on December 5, 2018, October 30, 2018, November 12, 2018, July 22, 2016  reviewed and discussed result with patient  Also he had carotid ultrasound November 2018 ordered by his Cardiologist, discussed result with  patient      Review of Systems   Constitutional: Negative for activity change, appetite change, fatigue, fever and unexpected weight change  HENT: Positive for rhinorrhea  Negative for congestion, ear pain, sinus pressure and sore throat  Eyes: Negative for visual disturbance  Respiratory: Positive for cough  Negative for chest tightness, shortness of breath and wheezing  Cardiovascular: Negative for chest pain, palpitations and leg swelling  Gastrointestinal: Negative for abdominal pain, blood in stool, constipation, diarrhea, nausea and vomiting  Genitourinary: Negative for dysuria, frequency, hematuria and urgency  Musculoskeletal: Negative for arthralgias, back pain, gait problem, joint swelling, myalgias and neck pain  Skin: Negative for rash  Neurological: Negative for dizziness, tremors, seizures, syncope, weakness, light-headedness and headaches  Hematological: Negative for adenopathy  Does not bruise/bleed easily  Psychiatric/Behavioral: Negative for behavioral problems, confusion, dysphoric mood and sleep disturbance  Objective:     Physical Exam   Constitutional: He is oriented to person, place, and time  He appears well-developed and well-nourished  No distress  HENT:   Head: Normocephalic  Right Ear: External ear normal    Left Ear: External ear normal    Nose: Nose normal    Mouth/Throat: Oropharynx is clear and moist    Tympanic membrane no acute changes  Patient wear hearing aids  Throat  Slight erythema   Eyes: EOM are normal  Left eye exhibits no discharge  No scleral icterus  Neck: Neck supple  No JVD present  No tracheal deviation present  No thyromegaly present  Cardiovascular: Normal rate and regular rhythm  No murmur heard  Legs  No edema  Feet  Patient declined foot exam   Pulmonary/Chest: Effort normal and breath sounds normal  No stridor  No respiratory distress  He has no wheezes  He has no rales  Abdominal: Soft  Bowel sounds are normal  He exhibits no mass  There is no tenderness  Lymphadenopathy:     He has no cervical adenopathy  Neurological: He is alert and oriented to person, place, and time  Coordination normal    Skin: No rash noted  Psychiatric: He has a normal mood and affect   His behavior is normal  Judgment normal

## 2019-03-26 PROBLEM — D72.829 LEUKOCYTOSIS: Status: ACTIVE | Noted: 2019-03-26

## 2019-03-26 PROBLEM — I65.23 BILATERAL CAROTID ARTERY STENOSIS: Status: ACTIVE | Noted: 2019-03-26

## 2019-03-28 ENCOUNTER — OFFICE VISIT (OUTPATIENT)
Dept: SLEEP CENTER | Facility: CLINIC | Age: 74
End: 2019-03-28
Payer: COMMERCIAL

## 2019-03-28 VITALS
DIASTOLIC BLOOD PRESSURE: 70 MMHG | WEIGHT: 131.6 LBS | SYSTOLIC BLOOD PRESSURE: 126 MMHG | HEART RATE: 81 BPM | HEIGHT: 64 IN | BODY MASS INDEX: 22.47 KG/M2

## 2019-03-28 DIAGNOSIS — F41.9 INSOMNIA SECONDARY TO ANXIETY: Primary | ICD-10-CM

## 2019-03-28 DIAGNOSIS — F41.9 ANXIETY: ICD-10-CM

## 2019-03-28 DIAGNOSIS — Z72.821 INADEQUATE SLEEP HYGIENE: ICD-10-CM

## 2019-03-28 DIAGNOSIS — F51.05 INSOMNIA SECONDARY TO ANXIETY: Primary | ICD-10-CM

## 2019-03-28 PROCEDURE — 99213 OFFICE O/P EST LOW 20 MIN: CPT | Performed by: PSYCHIATRY & NEUROLOGY

## 2019-03-28 NOTE — PROGRESS NOTES
Progress Note - [facility] Sleep [facility] 69 y o  male   :1945, MRN: [id number]  3/28/2019          Follow Up Evaluation / Problem:     Insomnia    HPI: Paige Henderson is a 69y o  year old male  He has had a history of insomnia for many years  Current Outpatient Medications:     fluticasone (CUTIVATE) 0 05 %, Apply topically daily, Disp: , Rfl:     fluticasone (FLONASE) 50 mcg/act nasal spray, 1 spray into each nostril daily, Disp: , Rfl:     lisinopril (ZESTRIL) 2 5 mg tablet, Take 2 5 mg by mouth, Disp: , Rfl:     LORazepam (ATIVAN) 0 5 mg tablet, Take 1 tablet (0 5 mg total) by mouth daily at bedtime, Disp: 30 tablet, Rfl: 5    melatonin 3 mg, Take 4 5 mg by mouth, Disp: , Rfl:     metFORMIN (GLUCOPHAGE) 1000 MG tablet, Take 1,000 mg by mouth, Disp: , Rfl:     metoprolol succinate (TOPROL-XL) 50 mg 24 hr tablet, Take 50 mg by mouth, Disp: , Rfl:     multivitamin (THERAGRAN) TABS, Take 1 tablet by mouth, Disp: , Rfl:     simvastatin (ZOCOR) 80 mg tablet, Take 40 mg by mouth, Disp: , Rfl:     Wilson Sleepiness Scale  Sitting and reading: Would never doze  Watching TV: Would never doze  Sitting, inactive in a public place (e g  a theatre or a meeting): Would never doze  As a passenger in a car for an hour without a break: Would never doze  Lying down to rest in the afternoon when circumstances permit: Slight chance of dozing  Sitting and talking to someone: Slight chance of dozing  Sitting quietly after a lunch without alcohol: Would never doze  In a car, while stopped for a few minutes in traffic: Would never doze  Total score: 2              Vitals:    19 1100   BP: 126/70   Pulse: 81   Weight: 59 7 kg (131 lb 9 6 oz)   Height: 5' 4" (1 626 m)       Body mass index is 22 59 kg/m²      Neck Circumference: 33       EPWORTH SLEEPINESS SCORE  Total score: 2      Past History Since Last Sleep Center Visit:     Since his last visit he has been taking 0 5 mg of lorazepam intermittently  He is also using 6 mg of melatonin at bedtime every night  He also uses medical marijuana prior to bed and again if he should wake up in the middle the night  He has not yet instituted at significant change in his bedtime schedule  Usually he is in bed by 21:30 and asleep by 22:00  He typically awakens at about 03:00 and may use medical marijuana again at that time  He is usually up by 07:30 to start his day  The review of systems and following portions of the patient's history were reviewed and updated as appropriate: allergies, current medications, past family history, past medical history, past social history, past surgical history, and problem list     Review of Systems      Genitourinary none   Cardiology none   Gastrointestinal none   Neurology none   Constitutional none   Integumentary none   Psychiatry none   Musculoskeletal none   Pulmonary none   ENT none   Endocrine none   Hematological none       OBJECTIVE      Physical Exam:     General Appearance:   Alert, cooperative, no distress, appears stated age     Head:   Normocephalic, without obvious abnormality, atraumatic     Eyes:   PERRL, conjunctiva/corneas clear, EOM's intact          Nose:  Nares normal, septum midline, no drainage or sinus tenderness     Neck:  Supple, symmetrical, trachea midline, no adenopathy; Thyroid: No enlargement, tenderness or nodules; no carotid bruit or JVD     Lungs:      Clear to auscultation bilaterally, respirations unlabored     Heart:   Regular rate and rhythm, S1 and S2 normal, no murmur, rub or gallop       Extremities:  Extremities normal, atraumatic, no cyanosis or edema     Pulses:  2+ and symmetric all extremities     Skin:  Skin color, texture, turgor normal, no rashes or lesions       Neurologic:  CNII-XII intact  Normal strength, sensation throughout       ASSESSMENT / PLAN    1  Insomnia secondary to anxiety     2  Anxiety     3   Inadequate sleep hygiene Counseling / Coordination of Care  Total clinic time spent today 15 minutes  Greater than 50% of total time was spent with the patient and / or family counseling and / or coordination of care  A description of the counseling / coordination of care:     Impressions, Prognosis, Instructions for management, Risks and benefits of treatment and Patient and family education    The following instructions have been given to the patient today:    Patient Instructions   1  Continue melatonin at 6 mg before bedtime  2  Continue intermittent doses of lorazepam 0 5 mg as needed  3  Continue medical marijuana as described  4  Schedule visits for CBT  5  Contact [facility] for prescriptions as needed  6  Return in 6 months for routine follow-up  7   Contact the [facility] if any problems arise prior to that time         Randal Jiang

## 2019-03-28 NOTE — PATIENT INSTRUCTIONS
1  Continue melatonin at 6 mg before bedtime  2  Continue intermittent doses of lorazepam 0 5 mg as needed  3  Continue medical marijuana as described  4  Schedule visits for CBT  5  Contact [facility] for prescriptions as needed  6  Return in 6 months for routine follow-up  7   Contact the [facility] if any problems arise prior to that time

## 2019-03-29 ENCOUNTER — TELEPHONE (OUTPATIENT)
Dept: FAMILY MEDICINE CLINIC | Facility: CLINIC | Age: 74
End: 2019-03-29

## 2019-06-17 DIAGNOSIS — G47.00 INSOMNIA DISORDER, WITH OTHER SLEEP DISORDER, RECURRENT: ICD-10-CM

## 2019-06-17 DIAGNOSIS — G47.8 INSOMNIA DISORDER, WITH OTHER SLEEP DISORDER, RECURRENT: ICD-10-CM

## 2019-06-17 RX ORDER — LORAZEPAM 0.5 MG/1
TABLET ORAL
Qty: 30 TABLET | Refills: 5 | Status: SHIPPED | OUTPATIENT
Start: 2019-06-17 | End: 2019-11-15

## 2019-09-30 ENCOUNTER — OFFICE VISIT (OUTPATIENT)
Dept: SLEEP CENTER | Facility: CLINIC | Age: 74
End: 2019-09-30
Payer: COMMERCIAL

## 2019-09-30 VITALS
SYSTOLIC BLOOD PRESSURE: 118 MMHG | BODY MASS INDEX: 22.71 KG/M2 | WEIGHT: 133 LBS | HEART RATE: 74 BPM | HEIGHT: 64 IN | DIASTOLIC BLOOD PRESSURE: 78 MMHG

## 2019-09-30 DIAGNOSIS — Z72.821 INADEQUATE SLEEP HYGIENE: Primary | ICD-10-CM

## 2019-09-30 DIAGNOSIS — F41.9 ANXIETY: ICD-10-CM

## 2019-09-30 DIAGNOSIS — G20 PARKINSON'S DISEASE (HCC): ICD-10-CM

## 2019-09-30 PROCEDURE — 99214 OFFICE O/P EST MOD 30 MIN: CPT | Performed by: PSYCHIATRY & NEUROLOGY

## 2019-09-30 RX ORDER — CARBIDOPA AND LEVODOPA 25; 100 MG/1; MG/1
1 TABLET, EXTENDED RELEASE ORAL 3 TIMES DAILY
COMMUNITY

## 2019-09-30 NOTE — PATIENT INSTRUCTIONS
1  Continue lorazepam 0 5 mg as needed  2  May supplement with an extra 0 5 mg tablet if necessary  3  May use melatonin 1 5 tablets as desired  4  Contact the [facility] for further refills on lorazepam  5  Referral to [facility] for cognitive behavioral therapy   6  Return in 6 months for routine re-evaluation  7  Contact [facility] if any problems occur prior to that time       Nursing Support:  When: Monday through Friday 7A-5PM except holidays  Where: Our direct line is [phone]  If you are having a true emergency please call 911  In the event that the line is busy or it is after hours please leave a voice message and we will return your call  Please speak clearly, leaving your full name, birth date, best number to reach you and the reason for your call  Medication refills: We will need the name of the medication, the dosage, the ordering provider, whether you get a 30 or 90 day refill, and the pharmacy name and address  Medications will be ordered by the provider only  Nurses cannot call in prescriptions  Please allow 7 days for medication refills  Physician requested updates: If your provider requested that you call with an update after starting medication, please be ready to provide us the medication and dosage, what time you take your medication, the time you attempt to fall asleep, time you fall asleep, when you wake up, and what time you get out of bed  Sleep Study Results: We will contact you with sleep study results and/or next steps after the physician has reviewed your testing

## 2019-09-30 NOTE — PROGRESS NOTES
Progress Note - [facility] Sleep [facility] 69 y o  male   :1945, MRN: [id number]  2019          Follow Up Evaluation / Problem:     Obstructive Sleep Apnea  Isabell      Thank you for the opportunity of participating in the evaluation and care of this patient in the Sleep Clinic at [facility]  HPI: Brien Marie is a 69y o  year old male  He has a long history of anxiety and sleep initiation insomnia  He is currently being treated with lorazepam with some success  Current Outpatient Medications:     carbidopa-levodopa (SINEMET CR)  mg TBCR per ER tablet, Take 1 tablet by mouth 3 (three) times a day, Disp: , Rfl:     fluticasone (CUTIVATE) 0 05 %, Apply topically daily, Disp: , Rfl:     fluticasone (FLONASE) 50 mcg/act nasal spray, 1 spray into each nostril daily, Disp: , Rfl:     lisinopril (ZESTRIL) 2 5 mg tablet, Take 2 5 mg by mouth, Disp: , Rfl:     melatonin 3 mg, Take 4 5 mg by mouth, Disp: , Rfl:     metFORMIN (GLUCOPHAGE) 1000 MG tablet, Take 1,000 mg by mouth, Disp: , Rfl:     metoprolol succinate (TOPROL-XL) 50 mg 24 hr tablet, Take 50 mg by mouth, Disp: , Rfl:     multivitamin (THERAGRAN) TABS, Take 1 tablet by mouth, Disp: , Rfl:     LORazepam (ATIVAN) 0 5 mg tablet, take 1 tablet by mouth daily at bedtime (Patient not taking: Reported on 2019), Disp: 30 tablet, Rfl: 5    simvastatin (ZOCOR) 80 mg tablet, Take 40 mg by mouth, Disp: , Rfl:     [place] Sleepiness Scale  Sitting and reading: Would never doze  Watching TV: Slight chance of dozing  Sitting, inactive in a public place (e g  a theatre or a meeting):  Would never doze  As a passenger in a car for an hour without a break: Slight chance of dozing  Lying down to rest in the afternoon when circumstances permit: Would never doze  Sitting and talking to someone: Would never doze  Sitting quietly after a lunch without alcohol: Slight chance of dozing  In a car, while stopped for a few minutes in traffic: Slight chance of dozing  Total score: 4              Vitals:    09/30/19 1000   BP: 118/78   Pulse: 74   Weight: 60 3 kg (133 lb)   Height: 5' 4" (1 626 m)       Body mass index is 22 83 kg/m²  Neck Circumference: 12       EPWORTH SLEEPINESS SCORE  Total score: 4      Past History Since Last Sleep Center Visit:     Mr Charles Estes was just diagnosed as having Parkinson's disease  He is now being treated with Sinemet CR and seems to doing relatively well  He states that he has had a tremor for many months but was just diagnosed with Parkinson's a few weeks ago  He is pleased with the results of his treatment to this point in time  He has also continued to show good response to lorazepam as far as insomnia is concerned  He typically takes 0 5 mg at bedtime  He may take an extra 0 5 mg if he is unable to fall asleep with his first dose  He also takes 1 5 tablets of melatonin most nights  He is uncertain as to the strength of each pill  Since he purchases this over-the-counter it is likely 3 mg or 5 mg in strength  On some nights he does not take any medications at all and sleeps relatively well      The review of systems and following portions of the patient's history were reviewed and updated as appropriate: allergies, current medications, past family history, past medical history, past social history, past surgical history, and problem list     Review of Systems      Genitourinary none   Cardiology none   Gastrointestinal none   Neurology awaken with headache   Constitutional none   Integumentary none   Psychiatry none   Musculoskeletal none   Pulmonary none   ENT none   Endocrine none   Hematological none       OBJECTIVE      Physical Exam:     General Appearance:   Alert, cooperative, no distress, appears stated age     Head:   Normocephalic, without obvious abnormality, atraumatic     Eyes:   PERRL, conjunctiva/corneas clear, EOM's intact          Nose:  Nares normal, septum midline, no drainage or sinus tenderness     Neck:  Supple, symmetrical, trachea midline, no adenopathy; Thyroid: No enlargement, tenderness or nodules; no carotid bruit or JVD     Lungs:      Clear to auscultation bilaterally, respirations unlabored     Heart:   Regular rate and rhythm, S1 and S2 normal, no murmur, rub or gallop       Extremities:  Extremities normal, atraumatic, no cyanosis or edema     Pulses:  2+ and symmetric all extremities     Skin:  Skin color, texture, turgor normal, no rashes or lesions       Neurologic:  CNII-XII intact  Normal strength, sensation throughout       ASSESSMENT / PLAN    1  Inadequate sleep hygiene     2  Anxiety     3  Parkinson's disease ([facility] [place] [address] )             Counseling / Coordination of Care  Total clinic time spent today 25 minutes  Greater than 50% of total time was spent with the patient and / or family counseling and / or coordination of care  A description of the counseling / coordination of care:     Impressions, Prognosis, Instructions for management, Risks and benefits of treatment and Risk factor reductions    The following instructions have been given to the patient today:    Patient Instructions   1  Continue lorazepam 0 5 mg as needed  2  May supplement with an extra 0 5 mg tablet if necessary  3  May use melatonin 1 5 tablets as desired  4  Contact the [facility] for further refills on lorazepam  5  Referral to [facility] for cognitive behavioral therapy   6  Return in 6 months for routine re-evaluation  7  Contact [facility] Center if any problems occur prior to that time       Nursing Support:  When: Monday through Friday 7A-5PM except holidays  Where: Our direct line is [phone]  If you are having a true emergency please call 911  In the event that the line is busy or it is after hours please leave a voice message and we will return your call  Please speak clearly, leaving your full name, birth date, best number to reach you and the reason for your call  Medication refills: We will need the name of the medication, the dosage, the ordering provider, whether you get a 30 or 90 day refill, and the pharmacy name and address  Medications will be ordered by the provider only  Nurses cannot call in prescriptions  Please allow 7 days for medication refills  Physician requested updates: If your provider requested that you call with an update after starting medication, please be ready to provide us the medication and dosage, what time you take your medication, the time you attempt to fall asleep, time you fall asleep, when you wake up, and what time you get out of bed  Sleep Study Results: We will contact you with sleep study results and/or next steps after the physician has reviewed your testing            Randal Mederos

## 2019-10-15 ENCOUNTER — OFFICE VISIT (OUTPATIENT)
Dept: FAMILY MEDICINE CLINIC | Facility: CLINIC | Age: 74
End: 2019-10-15
Payer: COMMERCIAL

## 2019-10-15 VITALS
SYSTOLIC BLOOD PRESSURE: 130 MMHG | HEART RATE: 66 BPM | HEIGHT: 64 IN | DIASTOLIC BLOOD PRESSURE: 80 MMHG | TEMPERATURE: 98.3 F | BODY MASS INDEX: 23.18 KG/M2 | WEIGHT: 135.8 LBS | OXYGEN SATURATION: 98 %

## 2019-10-15 DIAGNOSIS — E78.9 LIPID DISORDER: ICD-10-CM

## 2019-10-15 DIAGNOSIS — R97.20 PSA ELEVATION: ICD-10-CM

## 2019-10-15 DIAGNOSIS — F51.04 CHRONIC INSOMNIA: Primary | ICD-10-CM

## 2019-10-15 DIAGNOSIS — R79.89 LOW SERUM URIC ACID FOR AGE: ICD-10-CM

## 2019-10-15 DIAGNOSIS — Z28.20 IMMUNIZATION NOT CARRIED OUT BECAUSE OF PATIENT DECISION: ICD-10-CM

## 2019-10-15 DIAGNOSIS — E55.9 VITAMIN D DEFICIENCY: ICD-10-CM

## 2019-10-15 DIAGNOSIS — D72.829 LEUKOCYTOSIS, UNSPECIFIED TYPE: ICD-10-CM

## 2019-10-15 DIAGNOSIS — I25.5 ISCHEMIC CARDIOMYOPATHY: ICD-10-CM

## 2019-10-15 DIAGNOSIS — R80.9 PROTEINURIA, UNSPECIFIED TYPE: ICD-10-CM

## 2019-10-15 DIAGNOSIS — I65.23 BILATERAL CAROTID ARTERY STENOSIS: ICD-10-CM

## 2019-10-15 DIAGNOSIS — E11.42 TYPE 2 DIABETES MELLITUS WITH DIABETIC POLYNEUROPATHY, WITHOUT LONG-TERM CURRENT USE OF INSULIN (HCC): ICD-10-CM

## 2019-10-15 DIAGNOSIS — I10 ESSENTIAL HYPERTENSION: ICD-10-CM

## 2019-10-15 PROBLEM — S06.9X9A TRAUMATIC BRAIN INJURY WITH LOSS OF CONSCIOUSNESS (HCC): Status: ACTIVE | Noted: 2019-10-15

## 2019-10-15 PROCEDURE — 99214 OFFICE O/P EST MOD 30 MIN: CPT | Performed by: FAMILY MEDICINE

## 2019-10-15 PROCEDURE — 3075F SYST BP GE 130 - 139MM HG: CPT | Performed by: FAMILY MEDICINE

## 2019-10-15 PROCEDURE — 3008F BODY MASS INDEX DOCD: CPT | Performed by: FAMILY MEDICINE

## 2019-10-15 PROCEDURE — 3079F DIAST BP 80-89 MM HG: CPT | Performed by: FAMILY MEDICINE

## 2019-10-15 RX ORDER — EZETIMIBE AND SIMVASTATIN 10; 80 MG/1; MG/1
TABLET ORAL
COMMUNITY
End: 2019-10-15 | Stop reason: ALTCHOICE

## 2019-10-15 RX ORDER — MULTIVITAMIN WITH IRON
400 TABLET ORAL DAILY
COMMUNITY
End: 2019-10-15 | Stop reason: ALTCHOICE

## 2019-10-15 RX ORDER — FENOFIBRATE 145 MG/1
TABLET, COATED ORAL
COMMUNITY
End: 2019-10-15 | Stop reason: ALTCHOICE

## 2019-10-15 NOTE — PATIENT INSTRUCTIONS
Patient to have test done ordered with last office visit    And TSH ordered today  Patient to follow up with his test results

## 2019-10-15 NOTE — PROGRESS NOTES
To chin diarrhea goesBMI Counseling: Body mass index is 23 68 kg/m²  The BMI Assessment/Plan:          Diagnoses and all orders for this visit:    Chronic insomnia  Comments:  to follow with sleep center   Orders:  -     TSH, 3rd generation with Free T4 reflex; Future    Type 2 diabetes mellitus with diabetic polyneuropathy, without long-term current use of insulin (HCC)  Comments:  Patient stated he does have a blood work at the [place] every 6 including hemoglobin A1c, advised to follow with Ophthalmology for routine eye care    Proteinuria, unspecified type  Comments:  avoid NSAID  Check 24 hour urine for protein  Order exist    Leukocytosis, unspecified type  Comments:  Check CBC  Order exist    Vitamin D deficiency  Comments:  Check vitamin-D level  Order exist    Lipid disorder  Comments: To follow with low-fat diet  Check lipid profile and CMP  Order exist    Low serum uric acid for age  Comments:  Check uric acid level  Order exist    PSA elevation  Comments:  He follows with Dr Laura Cárdenas    Bilateral carotid artery stenosis  Comments:  Patient stated he is following with his Cardiology    Essential hypertension  Comments:  Controlled  To follow with the dash diet    Ischemic cardiomyopathy  Comments:  Asymptomatic  To follow with Cardiology    Immunization not carried out because of patient decision  Comments:  Patient stated he will take his flu shot at the [facility]  Recommended also Prevnar and shingles vaccine, [facility]    Other orders  -     glucose blood (FREESTYLE TEST STRIPS) test strip;  Test once daily and pRN Dx 250 00  -     CHOLECALCIFEROL PO; Take by mouth  -     CYANOCOBALAMIN PO; Take by mouth  -     Discontinue: Magnesium 250 MG TABS; Take 400 mg by mouth daily  -     Discontinue: MELATONIN ER PO; melatonin   Take 1 tablet (2 5mg) by mouth once daily   -     Discontinue: ezetimibe-simvastatin (VYTORIN) 10-80 MG per tablet; ezetimibe 10 mg-simvastatin 80 mg tablet  -     Discontinue: fenofibrate (TRICOR) 145 mg tablet; Take 1 tablet every day by oral route  Subjective:     Patient ID: Marylu Monahan is a 69 y o  male      Pt is her for follow up    insomnia , patient admit to chronic insomnia point years problem at mentioned to include he is able to fall asleep but when he wakes up it is very hard for him to go back to sleep and he did see Dr Crystal Martin and he placed him on Ativan  Patient is not very active during the day  Denied anxiety or depression  Carotid stenosis  Denied loss of vision, slurred speech, weakness or numbness  He said he does follow with Cardiology regarding his the stenosis  Vitamin-D deficiency  He denied falling denied depression  Coronary artery disease  Denied chest pain or shortness of breath  Diabetes  Denied polyuria or polydipsia  He is not checking blood sugar at home  Hypertension denied headache or dizziness admit to regular salt intake  No test done since last office visit  Patient stated he go to the [place] and he does his the labs and other test at the [facility]        Had colonoscopy 2 months ago at [address] [place] [address]   Constitutional: Negative for chills, fatigue, fever and unexpected weight change  HENT: Negative for congestion, ear pain, sore throat and trouble swallowing  Eyes: Negative for visual disturbance  Respiratory: Negative for cough and shortness of breath  Cardiovascular: Negative for chest pain, palpitations and leg swelling  Gastrointestinal: Negative for abdominal pain, blood in stool, constipation, diarrhea, nausea and vomiting  Endocrine: Negative for polydipsia and polyphagia  Genitourinary: Negative for dysuria, flank pain, frequency, hematuria and urgency  Musculoskeletal: Negative for arthralgias, back pain, gait problem, joint swelling and myalgias  Neurological: Negative for dizziness, seizures, syncope, numbness and headaches     Hematological: Negative for adenopathy  Does not bruise/bleed easily  Psychiatric/Behavioral: Negative for behavioral problems and confusion  The patient is not nervous/anxious  Objective:     Physical Exam   Constitutional: He is oriented to person, place, and time  He appears well-developed and well-nourished  No distress  HENT:   Head: Normocephalic  Mouth/Throat: Oropharynx is clear and moist  No oropharyngeal exudate  Eyes: Pupils are equal, round, and reactive to light  EOM are normal  No scleral icterus  Neck: Normal range of motion  No JVD present  No tracheal deviation present  Cardiovascular: Normal rate and regular rhythm  Exam reveals no gallop and no friction rub  No murmur heard  Feet are not checked because patient declined to take his shoes off  Legs  No edema   Pulmonary/Chest: Effort normal and breath sounds normal    Abdominal: Soft  Bowel sounds are normal  He exhibits no mass  There is no tenderness  Musculoskeletal: Normal range of motion  He exhibits no edema or tenderness  Lymphadenopathy:     He has no cervical adenopathy  Neurological: He is alert and oriented to person, place, and time  No cranial nerve deficit  He exhibits normal muscle tone  Coordination normal    Skin: No rash noted  Psychiatric: He has a normal mood and affect   His behavior is normal

## 2019-10-16 ENCOUNTER — TELEPHONE (OUTPATIENT)
Dept: FAMILY MEDICINE CLINIC | Facility: CLINIC | Age: 74
End: 2019-10-16

## 2019-10-16 NOTE — TELEPHONE ENCOUNTER
Patient would like TSH order faxed to [place] Dr Justa Wetzel at [phone]  Faxed 3x and it is not going through, patient is aware and will call back with more information tomorrow

## 2019-10-18 PROBLEM — R79.89: Status: ACTIVE | Noted: 2019-10-18

## 2019-10-18 PROBLEM — R97.20 PSA ELEVATION: Status: ACTIVE | Noted: 2019-10-18

## 2019-10-24 ENCOUNTER — TELEPHONE (OUTPATIENT)
Dept: BEHAVIORAL/MENTAL HEALTH CLINIC | Facility: CLINIC | Age: 74
End: 2019-10-24

## 2019-10-24 NOTE — TELEPHONE ENCOUNTER
Behavorial Health Outpatient Intake Questions    Referred by:    Please advised interviewee that they need to answer all questions truthfully to allow for best care and any misrepresentations of information may affect their ability to be seen at this clinic   => Was this discussed? Yes     Behavorial Health Outpatient Intake History -     Presenting Problem (in patient's words):  PROBLEMS SLEEPING, TAKES   5MG OF LORAZEPAM, 3 MG MELATONIN  Has the patient ever seen or is currently seeing a psychiatrist? Yes  UNSURE WHO, 10 YEARS AGO  If yes who/when? If seen as outpatient, have they been seen here (and by whom)? If not seen here, which provider(s) did the patient see and for how long? Has the patient ever seen or currently see a therapist? Yes If yes who/when? UNSURE WHEN    Has a member of the patient's family been in therapy here? No  If yes, with whom? Has the patient been hospitalized for mental health? No   If yes, how long ago was last hospitalization and where was it? Substance Abuse:No concerns of substance abuse are reported  Does the patient have ICM or CTT? No    Is the patient taking injectable psychiatric medications? No    => If yes, patient cannot be seen here  Communications  Are there any developmental disabilities? No    Does the patient have hearing impairment? No       History-    Has the patient served in the [place] [address]? Yes    If yes, have you had combat services? NO    Was the patient activated into federal active duty as a member of the [employer] or reserve? No    Legal History-     Does the patient have any history of arrests, custodial/shelter time, or DUIs? No  If Yes-  1) What types of charges? 2) When were they last incarcerated? 3) Are they currently on parole or probation? Minor Child-    Who has custody of the child? Is there a custody agreement?      If there is a custody agreement remind parent that they must bring a copy to the first appt or they will not be seen  Intake Team, please check with provider before scheduling if flags come up such as:  - complex case  - legal history (other than DUI)  - communication barrier concerns are present  - if, in your judgment, this needs further review    ACCEPTED as a patient Yes  => Appointment Date: 11/22/2019 W/ ISAIAH MACIAS    Referred Elsewhere? No    Name of Insurance Co: [address] ID# [employer] Phone #  If ins is primary or secondary  If patient is a minor, parents information such as Name, D  O B of guarantor

## 2019-10-27 NOTE — TELEPHONE ENCOUNTER
Patient called for medication update  Reports doing well on Lorazepam 0 5mg   Patient scheduled f/u appt for 3/28/2019 No

## 2019-11-12 ENCOUNTER — TELEPHONE (OUTPATIENT)
Dept: SLEEP CENTER | Facility: CLINIC | Age: 74
End: 2019-11-12

## 2019-11-12 NOTE — TELEPHONE ENCOUNTER
Patient left message stating he would like to make an appointment  He states he would like his medicine increased  Left message for patient to call back

## 2019-11-13 NOTE — TELEPHONE ENCOUNTER
Patient is taking 0 5 mg lorazepam at bedtime- usually aroung 8:30 pm  Occasionally he will take it as early as 6 pm if he's very anxious, but he doesn't take it at bedtime those days  He then takes 4 5 mg melatonin at 10 pm      He is only sleeping 4-4 5 hours per night  He will occasionally take an additional 1 5 mg melatonin when he wakes during the night  He is asking if you will increase the lorazepam to 1 mg at bedtime   Please advise

## 2019-11-14 NOTE — TELEPHONE ENCOUNTER
He is currently only taking 0 5 mg at bedtime  Do you wish for him to INCREASE to 1 mg at bedtime? Please advise   If so he will need a new script

## 2019-11-14 NOTE — TELEPHONE ENCOUNTER
Ask him to try using 0 5 mg (1/2 pill) should he become anxious earlier in the evening  He should continue taking 1 mg at bedtime  Call if this is ineffective or if any problems arise

## 2019-11-14 NOTE — TELEPHONE ENCOUNTER
Advised patient he can increase to 1 mg per Dr Brenda Brown & a new script will be sent  He is very appreciative

## 2019-11-15 DIAGNOSIS — G47.8 INSOMNIA DISORDER, WITH OTHER SLEEP DISORDER, RECURRENT: Primary | ICD-10-CM

## 2019-11-15 DIAGNOSIS — G47.00 INSOMNIA DISORDER, WITH OTHER SLEEP DISORDER, RECURRENT: Primary | ICD-10-CM

## 2019-11-15 RX ORDER — LORAZEPAM 0.5 MG/1
TABLET ORAL
Qty: 45 TABLET | Refills: 3 | Status: SHIPPED | OUTPATIENT
Start: 2019-11-15 | End: 2019-12-02 | Stop reason: SDUPTHER

## 2019-11-15 NOTE — TELEPHONE ENCOUNTER
Script written for lorazepam 0 5 mg tabs  Take 2 at bedtime nightly and 1 earlier in the day for anxiety as needed

## 2019-11-15 NOTE — TELEPHONE ENCOUNTER
Spoke with patient he will increase his Lorazepam to 1 mg at HS  Also he will try taking Lorazepam 0 5 mg earlier in the evening if he becomes anxious  Patient needs new RX to reflect new dosing instructions

## 2019-12-02 DIAGNOSIS — G47.8 INSOMNIA DISORDER, WITH OTHER SLEEP DISORDER, RECURRENT: ICD-10-CM

## 2019-12-02 DIAGNOSIS — G47.00 INSOMNIA DISORDER, WITH OTHER SLEEP DISORDER, RECURRENT: ICD-10-CM

## 2019-12-02 NOTE — TELEPHONE ENCOUNTER
Patient called for refill  It was filled 11/15 but quantity was not correct  He is taking 2 tablets at bedtime as prescribed & occasionally taking 1 earlier in the day for anxiety  Was only given a quantity of 45 on 11/15 which does not reflect how script is being taken  He is leaving for [place] Friday morning

## 2019-12-03 RX ORDER — LORAZEPAM 0.5 MG/1
TABLET ORAL
Qty: 75 TABLET | Refills: 2 | Status: SHIPPED | OUTPATIENT
Start: 2019-12-03 | End: 2020-05-15

## 2020-01-13 ENCOUNTER — TRANSCRIBE ORDERS (OUTPATIENT)
Dept: PHYSICAL THERAPY | Facility: CLINIC | Age: 75
End: 2020-01-13

## 2020-01-13 ENCOUNTER — EVALUATION (OUTPATIENT)
Dept: PHYSICAL THERAPY | Facility: CLINIC | Age: 75
End: 2020-01-13
Payer: OTHER GOVERNMENT

## 2020-01-13 DIAGNOSIS — G89.29 CHRONIC LEFT SHOULDER PAIN: ICD-10-CM

## 2020-01-13 DIAGNOSIS — M25.551 PAIN IN RIGHT HIP: Primary | ICD-10-CM

## 2020-01-13 DIAGNOSIS — M25.512 CHRONIC LEFT SHOULDER PAIN: ICD-10-CM

## 2020-01-13 PROCEDURE — 97110 THERAPEUTIC EXERCISES: CPT | Performed by: PHYSICAL THERAPIST

## 2020-01-13 PROCEDURE — 97162 PT EVAL MOD COMPLEX 30 MIN: CPT | Performed by: PHYSICAL THERAPIST

## 2020-01-13 NOTE — PROGRESS NOTES
PT Evaluation     Today's date: 2020  Patient name: Nella Hummel  : 1945  MRN: [id number]  Referring provider: Cl Ashley MD  Dx:   Encounter Diagnosis     ICD-10-CM    1  Pain in right hip M25 551    2  Chronic left shoulder pain M25 512     G89 29                   Assessment  Assessment details: Khoa Oh reports to therapy with CC of pain in posterior R hip and L shoulder region  Results of hip eval indicate limited lumbar AROM and pain, impaired posture, tightness and tenderness to R lumbar musculature, limited intervertebral mobility, mild LE weakness, and limited muscle flexibility  Results of shoulder eval indicated tightness in b/l upper trapezius muscles and trigger point to L upper trap  Special testing negative for hip or glenohumeral dysfunction  Patient's complaints appear related to R lumbar and left upper trapezius musculature secondary to weakness, impaired posture, and impaired flexibility  These deficits are resulting in difficulty with walking and mobility during the morning and participating in exercise to maintain his health  Would benefit from skilled PT to address these deficits and improve QOL     Barriers to therapy: Parkinson's   Understanding of Dx/Px/POC: good   Prognosis: good    Goals  STGs (4 weeks)  Pt will be independent with comprehensive HEP   Pt will report reduced pain by at least 3 point at worst    LTG's (to be achieved by d/c)  Pt will be able to self manage sx's independently   Pt will demonstrate 5/5 LE MMT's  Pt will report no greater than 1/10 pain at worst  Pt will report at least 75% reduced difficulty getting out of bed in morning due to pain  Pt will be able to return to his home exericse routine to maintain health and fitness    Plan  Plan details: Initiate POC  Patient would benefit from: skilled physical therapy  Planned therapy interventions: abdominal trunk stabilization, home exercise program, flexibility, therapeutic exercise, therapeutic activities, stretching, strengthening, patient education, postural training, neuromuscular re-education, manual therapy and joint mobilization  Frequency: 2x week  Duration in weeks: 8  Plan of Care beginning date: 1/13/2020  Plan of Care expiration date: 3/31/2020  Treatment plan discussed with: patient        Subjective Evaluation    History of Present Illness  Mechanism of injury: Comes to therapy with CC of R hip and L shoulder pain which started about 6 months ago with insidious onset  Was seen at [facility] and referred to PT  Denies any imaging  States his hip is more the issue  While shoulder pain is primarily L, it sometimes occurs in R side  Vivien numbness or tingling    States that his hip pain is worse morning when first getting up  Pain is better with rest and medication, worse with activity  Pain location is R buttocks region  Has hx of low back pain which he feels is similar to this in nature  Shoulder pain is not consistent and cannot identify worsening or relieving factors  Reports difficulty moving in the morning due to his pain  Needs to utilize medication and modalities to be able to move  Stopped doing exericse at home due to his pain  Goals: reduce pain  Pain  Current pain rating: 3          Objective    SHOULDER EVAL  Flowsheet Rows      Most Recent Value   PT/OT G-Codes   Current Score  70   Projected Score  70         Posture: forwrad flexed posture, rounded shoulders       Palpation: tender to palpation in L upper trap with palpable trigger point    Functional AROM screen: Shoulder AROM is WFL and symmetrical both sides    Shoulder MMT's  Right  Left   Forward Flexion  4+   Extension     Abduction  4+   Ext Rot     Int Rot          Mid trap     Lower trap          Elbow flex     Elbow ext       Shoulder ROM Right  Left   Flexion  WFL    Extension  WFL   Abduction  WFL   Ext Rot  WFL   Int Rot  WFL         Special Tests   Painful arc: codi Seals: neg HIP EVAL    Palpation: tender to palpation at superior glut and quadratus lumborum on R side       Hip Range of Motion Right  Left   Flexion WLF    Extension     Abduction WFL    Ext Rot WFL    Int Rot 5 deg      Hip MMT Right  Left   Flexion 4+ 4   Extension 4 4   Abduction 4 4   Ext Rot 5 5   Int Rot 5 5         Knee MMT Right  Left   Flexion 5    Extension 5        Ankle MMT Right  Left   Dorsi Flex 5 5   Plantar flex         Special tests:   Quadrant test: neg    Anterior impingement test: neg   ALIREZA test: neg    FAIR (piriformis) test: neg        Lumbar AROM: limited in extension, b/l rotation, and b/l side bending  Pain is replicated with R rotation       Hamstring Length: mild tightness b/l     Dahlia's Test: pos b/l    Rah test: pos for rectus femoris and iliopsoas tightness b/l     Gait Observations: mild shuffling gait pattern       Precautions: Parkinson's, heart attack in 2002, HTN, hearing difficulty, macular degeneration       Manual              Lumbar mobilizations             STM to right QL             Trigger point release L upper trap                                         *  Exercise Diary  1/13            Bike              Lower trunk rotation 20x            Bridges  10x 5"            Transverse ab contract             TA contract with marches and kicks             Clamshells              Seated pball roll out                          Upper trap stretch 20"x3            Chin tucks                                                                                                                                                   Modalities

## 2020-01-13 NOTE — LETTER
2020    Kassi De La Paz MD  [address]  [id number]    Patient: Alan Edmond   YOB: 1945   Date of Visit: 2020     Encounter Diagnosis     ICD-10-CM    1  Pain in right hip M25 551    2  Chronic left shoulder pain M25 512     G89 29        Dear Dr Alexandria Jalloh: Thank you for your recent referral of Alan Edmond  Please review the attached evaluation summary from Elder's recent visit  Please verify that you agree with the plan of care by signing the attached order  If you have any questions or concerns, please do not hesitate to call  I sincerely appreciate the opportunity to share in the care of one of your patients and hope to have another opportunity to work with you in the near future  Sincerely,    Enoc Bella, PT      Referring Provider:      I certify that I have read the below Plan of Care and certify the need for these services furnished under this plan of treatment while under my care  Kassi De La Paz MD  [address]: [phone]          PT Evaluation     Today's date: 2020  Patient name: Alan Edmond  : 1945  MRN: [id number]  Referring provider: Denia Trivedi MD  Dx:   Encounter Diagnosis     ICD-10-CM    1  Pain in right hip M25 551    2  Chronic left shoulder pain M25 512     G89 29                   Assessment  Assessment details: Nora Moncada reports to therapy with CC of pain in posterior R hip and L shoulder region  Results of hip eval indicate limited lumbar AROM and pain, impaired posture, tightness and tenderness to R lumbar musculature, limited intervertebral mobility, mild LE weakness, and limited muscle flexibility  Results of shoulder eval indicated tightness in b/l upper trapezius muscles and trigger point to L upper trap  Special testing negative for hip or glenohumeral dysfunction   Patient's complaints appear related to R lumbar and left upper trapezius musculature secondary to weakness, impaired posture, and impaired flexibility  These deficits are resulting in difficulty with walking and mobility during the morning and participating in exercise to maintain his health  Would benefit from skilled PT to address these deficits and improve QOL  Barriers to therapy: Parkinson's   Understanding of Dx/Px/POC: good   Prognosis: good    Goals  STGs (4 weeks)  Pt will be independent with comprehensive HEP   Pt will report reduced pain by at least 3 point at worst    LTG's (to be achieved by d/c)  Pt will be able to self manage sx's independently   Pt will demonstrate 5/5 LE MMT's  Pt will report no greater than 1/10 pain at worst  Pt will report at least 75% reduced difficulty getting out of bed in morning due to pain  Pt will be able to return to his home exericse routine to maintain health and fitness    Plan  Plan details: Initiate POC  Patient would benefit from: skilled physical therapy  Planned therapy interventions: abdominal trunk stabilization, home exercise program, flexibility, therapeutic exercise, therapeutic activities, stretching, strengthening, patient education, postural training, neuromuscular re-education, manual therapy and joint mobilization  Frequency: 2x week  Duration in weeks: 8  Plan of Care beginning date: 1/13/2020  Plan of Care expiration date: 3/31/2020  Treatment plan discussed with: patient        Subjective Evaluation    History of Present Illness  Mechanism of injury: Comes to therapy with CC of R hip and L shoulder pain which started about 6 months ago with insidious onset  Was seen at [facility] and referred to PT  Denies any imaging  States his hip is more the issue  While shoulder pain is primarily L, it sometimes occurs in R side  Vivien numbness or tingling    States that his hip pain is worse morning when first getting up  Pain is better with rest and medication, worse with activity   Pain location is R buttocks region  Has hx of low back pain which he feels is similar to this in nature  Shoulder pain is not consistent and cannot identify worsening or relieving factors  Reports difficulty moving in the morning due to his pain  Needs to utilize medication and modalities to be able to move  Stopped doing exericse at home due to his pain  Goals: reduce pain  Pain  Current pain rating: 3          Objective    SHOULDER EVAL  Flowsheet Rows      Most Recent Value   PT/OT G-Codes   Current Score  70   Projected Score  70         Posture: forwrad flexed posture, rounded shoulders  Palpation: tender to palpation in L upper trap with palpable trigger point    Functional AROM screen: Shoulder AROM is WFL and symmetrical both sides    Shoulder MMT's  Right  Left   Forward Flexion  4+   Extension     Abduction  4+   Ext Rot     Int Rot          Mid trap     Lower trap          Elbow flex     Elbow ext       Shoulder ROM Right  Left   Flexion  WFL    Extension  WFL   Abduction  WFL   Ext Rot  WFL   Int Rot  WFL         Special Tests   Painful arc: neg   Hooper Lager: neg       HIP EVAL    Palpation: tender to palpation at superior glut and quadratus lumborum on R side       Hip Range of Motion Right  Left   Flexion WLF    Extension     Abduction WFL    Ext Rot WFL    Int Rot 5 deg      Hip MMT Right  Left   Flexion 4+ 4   Extension 4 4   Abduction 4 4   Ext Rot 5 5   Int Rot 5 5         Knee MMT Right  Left   Flexion 5    Extension 5        Ankle MMT Right  Left   Dorsi Flex 5 5   Plantar flex         Special tests:   Quadrant test: neg    Anterior impingement test: neg   ALIREZA test: neg    FAIR (piriformis) test: neg        Lumbar AROM: limited in extension, b/l rotation, and b/l side bending  Pain is replicated with R rotation       Hamstring Length: mild tightness b/l     Dahlia's Test: pos b/l    Rah test: pos for rectus femoris and iliopsoas tightness b/l     Gait Observations: mild shuffling gait pattern       Precautions: Parkinson's, heart attack in 2002, HTN, hearing difficulty, macular degeneration       Manual              Lumbar mobilizations             STM to right QL             Trigger point release L upper trap                                         *  Exercise Diary  1/13            Bike              Lower trunk rotation 20x            Bridges  10x 5"            Transverse ab contract             TA contract with marches and kicks             Clamshells              Seated pball roll out                          Upper trap stretch 20"x3            Chin tucks                                                                                                                                                   Modalities

## 2020-01-15 ENCOUNTER — APPOINTMENT (OUTPATIENT)
Dept: PHYSICAL THERAPY | Facility: CLINIC | Age: 75
End: 2020-01-15
Payer: OTHER GOVERNMENT

## 2020-01-16 ENCOUNTER — APPOINTMENT (OUTPATIENT)
Dept: PHYSICAL THERAPY | Facility: CLINIC | Age: 75
End: 2020-01-16
Payer: OTHER GOVERNMENT

## 2020-01-20 ENCOUNTER — OFFICE VISIT (OUTPATIENT)
Dept: PHYSICAL THERAPY | Facility: CLINIC | Age: 75
End: 2020-01-20
Payer: OTHER GOVERNMENT

## 2020-01-20 DIAGNOSIS — M25.512 CHRONIC LEFT SHOULDER PAIN: ICD-10-CM

## 2020-01-20 DIAGNOSIS — G89.29 CHRONIC LEFT SHOULDER PAIN: ICD-10-CM

## 2020-01-20 DIAGNOSIS — M25.551 PAIN IN RIGHT HIP: Primary | ICD-10-CM

## 2020-01-20 PROCEDURE — 97110 THERAPEUTIC EXERCISES: CPT

## 2020-01-20 PROCEDURE — 97112 NEUROMUSCULAR REEDUCATION: CPT

## 2020-01-20 NOTE — PROGRESS NOTES
Daily Note     Today's date: 2020  Patient name: Valarie Ugalde  : 1945  MRN: [id number]  Referring provider: Sandro Mancini MD  Dx:   Encounter Diagnosis     ICD-10-CM    1  Pain in right hip M25 551    2  Chronic left shoulder pain M25 512     G89 29        Start Time: 1116  Stop Time: 1200  Total time in clinic (min): 44 minutes    Subjective: Patient noted that he has hip pain that is moving across the lower back area  Objective: See treatment diary below      Assessment: Patient performed various strengthening and stretching exercises for the hips and shoulders to assist c pain levels during functional activities  Patient required moderate verbal and visual cues throughout the session  Patient noted no increase in pain  Patient would benefit from continued PT to allow the patient to return to his PLOF  Plan: Continue per plan of care        Precautions: Parkinson's, heart attack in , HTN, hearing difficulty, macular degeneration       Manual             Lumbar mobilizations             STM to right QL             Trigger point release L upper trap                                         *  Exercise Diary             X-ride  10'           Lower trunk rotation 20x 5x15"ea           Bridges  10x 5" 2x10  5" hold           Transverse ab contract  nv           TA contract with herrera and amandas  nv           Clamshells              Hip abd  GTB  2x10  5" hold           Hip add  2x10  5" hold           Upper trap stretch 20"x3            Chin tucks  2x10  5" hold           Open books  5x15"ea           Piriformis stretch  5x15" ea           Serratus punch  2x10           Scapular retraction seated  2x10  5" hold                                                                                             Modalities

## 2020-01-22 ENCOUNTER — APPOINTMENT (OUTPATIENT)
Dept: PHYSICAL THERAPY | Facility: CLINIC | Age: 75
End: 2020-01-22
Payer: OTHER GOVERNMENT

## 2020-01-24 ENCOUNTER — OFFICE VISIT (OUTPATIENT)
Dept: PHYSICAL THERAPY | Facility: CLINIC | Age: 75
End: 2020-01-24
Payer: OTHER GOVERNMENT

## 2020-01-24 DIAGNOSIS — M25.512 CHRONIC LEFT SHOULDER PAIN: ICD-10-CM

## 2020-01-24 DIAGNOSIS — G89.29 CHRONIC LEFT SHOULDER PAIN: ICD-10-CM

## 2020-01-24 DIAGNOSIS — M25.551 PAIN IN RIGHT HIP: Primary | ICD-10-CM

## 2020-01-24 PROCEDURE — 97112 NEUROMUSCULAR REEDUCATION: CPT

## 2020-01-24 PROCEDURE — 97110 THERAPEUTIC EXERCISES: CPT

## 2020-01-24 NOTE — PROGRESS NOTES
Daily Note     Today's date: 2020  Patient name: Lennie Smiley  : 1945  MRN: [id number]  Referring provider: Everrett Schilder, MD  Dx:   Encounter Diagnosis     ICD-10-CM    1  Pain in right hip M25 551    2  Chronic left shoulder pain M25 512     G89 29        Start Time: 1100  Stop Time: 1153  Total time in clinic (min): 53 minutes    Subjective: Patient noted that he felt good after last session  Patient noted some low back soreness  Patient noted it usually occurs in the morning  Objective: See treatment diary below      Assessment: Patient performed the exercises with less breaks  Patient required moderate cues for form throughout the session  Patient noted no increase in pain throughout the session  Patient would benefit from continued PT to allow the patient to return to his PLOF  Plan: Continue per plan of care        Precautions: Parkinson's, heart attack in , HTN, hearing difficulty, macular degeneration       Manual            Lumbar mobilizations             STM to right QL             Trigger point release L upper trap                                         *  Exercise Diary            X-ride  10' 10'          Lower trunk rotation 20x 5x15"ea 5x15" ea          Bridges  10x 5" 2x10  5" hold 2x10  5" hold          Transverse ab contract  nv nv          TA contract with marches   nv           Clamshells              Hip abd  GTB  2x10  5" hold GTB  2x10  5" hold          Hip add  2x10  5" hold 2x10  5"  hold          Upper trap stretch 20"x3            Chin tucks  2x10  5" hold 2x10  5" hold          Open books  5x15"ea 5x15"ea          Piriformis stretch  5x15" ea 5x15" ea          Serratus punch  2x10 2x10          Scapular retraction seated  2x10  5" hold 2x10  5" hold                                                                                            Modalities

## 2020-01-29 ENCOUNTER — OFFICE VISIT (OUTPATIENT)
Dept: PHYSICAL THERAPY | Facility: CLINIC | Age: 75
End: 2020-01-29
Payer: OTHER GOVERNMENT

## 2020-01-29 DIAGNOSIS — G89.29 CHRONIC LEFT SHOULDER PAIN: ICD-10-CM

## 2020-01-29 DIAGNOSIS — M25.551 PAIN IN RIGHT HIP: Primary | ICD-10-CM

## 2020-01-29 DIAGNOSIS — M25.512 CHRONIC LEFT SHOULDER PAIN: ICD-10-CM

## 2020-01-29 PROCEDURE — 97112 NEUROMUSCULAR REEDUCATION: CPT

## 2020-01-29 PROCEDURE — 97140 MANUAL THERAPY 1/> REGIONS: CPT

## 2020-01-29 PROCEDURE — 97110 THERAPEUTIC EXERCISES: CPT

## 2020-01-29 NOTE — PROGRESS NOTES
Daily Note     Today's date: 2020  Patient name: Kalyn Maciel  : 1945  MRN: [id number]  Referring provider: Virgie Luz MD  Dx:   Encounter Diagnosis     ICD-10-CM    1  Pain in right hip M25 551    2  Chronic left shoulder pain M25 512     G89 29        Start Time: 1400  Stop Time: 1445  Total time in clinic (min): 45 minutes    Subjective: Patient noted that he continues to have stiffness and soreness in the morning, however his function is improving at home  Objective: See treatment diary below      Assessment: PT introduced clams and TA contraction to assist c support and stability during functional activities  Patient required min VCs for form throughout the session  PT performed grade III-IV lumbar unilateral TP PA to assist c mobility in the morning  Patient noted improvements post mobilizations  Patient would benefit from continued PT to allow the patient to return to his PLOF  Plan: Continue per plan of care        Precautions: Parkinson's, heart attack in , HTN, hearing difficulty, macular degeneration       Manual           Pelvic rocking    MW         STM to right QL             Trigger point release L upper trap             PA to TP of L 3-5 on R    Grade  III-IV  MW                        *  Exercise Diary           X-ride  10' 10' 10'         Lower trunk rotation 20x 5x15"ea 5x15" ea 5x15" ea         Bridges  10x 5" 2x10  5" hold 2x10  5" hold 2x10  5" hold         Transverse ab contract  nv nv 2x10  5" hold         TA contract with marches   nv  nv         Clamshells     x15ea         Hip abd  GTB  2x10  5" hold GTB  2x10  5" hold BTB  2x10  5" hold         Hip add  2x10  5" hold 2x10  5"  hold 2x10  5" hold         Upper trap stretch 20"x3            Chin tucks  2x10  5" hold 2x10  5" hold 2x10  5" hold         Open books  5x15"ea 5x15"ea 5x15" ea         Piriformis stretch  5x15" ea 5x15" ea 5x15" ea         Serratus punch 2x10 2x10 2x10         Scapular retraction seated  2x10  5" hold 2x10  5" hold 2x10  5" hold                                                                                           Modalities

## 2020-01-31 ENCOUNTER — OFFICE VISIT (OUTPATIENT)
Dept: PHYSICAL THERAPY | Facility: CLINIC | Age: 75
End: 2020-01-31
Payer: OTHER GOVERNMENT

## 2020-01-31 DIAGNOSIS — M25.551 PAIN IN RIGHT HIP: Primary | ICD-10-CM

## 2020-01-31 DIAGNOSIS — G89.29 CHRONIC LEFT SHOULDER PAIN: ICD-10-CM

## 2020-01-31 DIAGNOSIS — M25.512 CHRONIC LEFT SHOULDER PAIN: ICD-10-CM

## 2020-01-31 PROCEDURE — 97110 THERAPEUTIC EXERCISES: CPT

## 2020-01-31 PROCEDURE — 97140 MANUAL THERAPY 1/> REGIONS: CPT

## 2020-01-31 PROCEDURE — 97112 NEUROMUSCULAR REEDUCATION: CPT

## 2020-01-31 NOTE — PROGRESS NOTES
Daily Note     Today's date: 2020  Patient name: Marylu Monahan  : 1945  MRN: [id number]  Referring provider: Bee Espinoza MD  Dx:   Encounter Diagnosis     ICD-10-CM    1  Pain in right hip M25 551    2  Chronic left shoulder pain M25 512     G89 29        Start Time: 1100  Stop Time: 1150  Total time in clinic (min): 50 minutes    Subjective: Patient noted that he felt good after last session  Patient noted some R hip pain  Objective: See treatment diary below      Assessment: PT noted improvements in joint mobility post manuals  Patient required min VCs for exercises  Patient noted increased pain throughout the session  Patient would benefit from continued PT to allow the patient to return to his PLOF  Plan: Continue per plan of care        Precautions: Parkinson's, heart attack in , HTN, hearing difficulty, macular degeneration       Manual          Pelvic rocking    MW MW        STM to right QL             Trigger point release L upper trap             PA to TP of L 3-5 on R    Grade  III-IV  MW Grade  III-IV  MW                       *  Exercise Diary          X-ride  10' 10' 10' 10'        Lower trunk rotation 20x 5x15"ea 5x15" ea 5x15" ea 5x15"ea        Bridges  10x 5" 2x10  5" hold 2x10  5" hold 2x10  5" hold 2x10  5" hold  BTB        Transverse ab contract  nv nv 2x10  5" hold 2x10  5" hold        TA contract with marches   nv  nv         Clamshells     x15ea x15ea        Hip abd  GTB  2x10  5" hold GTB  2x10  5" hold BTB  2x10  5" hold BTB  2x10  5" hold        Hip add  2x10  5" hold 2x10  5"  hold 2x10  5" hold 3x10  5" hold        Upper trap stretch 20"x3            Chin tucks  2x10  5" hold 2x10  5" hold 2x10  5" hold 2x10  5" hold  cues        Open books  5x15"ea 5x15"ea 5x15" ea 5x15"ea        Piriformis stretch  5x15" ea 5x15" ea 5x15" ea 5x15"ea        Serratus punch  2x10 2x10 2x10 2x10        Scapular retraction seated  2x10  5" hold 2x10  5" hold 2x10  5" hold 2x10  5" hold                                                                                          Modalities

## 2020-02-03 ENCOUNTER — TELEPHONE (OUTPATIENT)
Dept: FAMILY MEDICINE CLINIC | Facility: CLINIC | Age: 75
End: 2020-02-03

## 2020-02-03 NOTE — TELEPHONE ENCOUNTER
Spoke with Debbie Reid to schedule a DM office visit follow up  He states that he primarily sees the [place]  Dominick Pilar will call to get previous records

## 2020-02-04 ENCOUNTER — OFFICE VISIT (OUTPATIENT)
Dept: PHYSICAL THERAPY | Facility: CLINIC | Age: 75
End: 2020-02-04
Payer: OTHER GOVERNMENT

## 2020-02-04 DIAGNOSIS — G89.29 CHRONIC LEFT SHOULDER PAIN: ICD-10-CM

## 2020-02-04 DIAGNOSIS — M25.551 PAIN IN RIGHT HIP: Primary | ICD-10-CM

## 2020-02-04 DIAGNOSIS — M25.512 CHRONIC LEFT SHOULDER PAIN: ICD-10-CM

## 2020-02-04 PROCEDURE — 97140 MANUAL THERAPY 1/> REGIONS: CPT

## 2020-02-04 PROCEDURE — 97112 NEUROMUSCULAR REEDUCATION: CPT

## 2020-02-04 NOTE — PROGRESS NOTES
Daily Note     Today's date: 2020  Patient name: Kalyn Maciel  : 1945  MRN: [id number]  Referring provider: Virgie Luz MD  Dx:   Encounter Diagnosis     ICD-10-CM    1  Pain in right hip M25 551    2  Chronic left shoulder pain M25 512     G89 29        Start Time: 1038  Stop Time: 1130  Total time in clinic (min): 52 minutes    Subjective: Patient noted the shoulder continues to feel good and the hip almost has no pain in it  Objective: See treatment diary below      Assessment: PT continues to note improvement with lumbar joint mobility post manuals and noted improvements during transfers  PT introduce the leg press and no monies to assist c functional activities  Patient required min VCs throughout the session  Patient would benefit from continued PT to allow the patient to return to his PLOF  Plan: Continue per plan of care        Precautions: Parkinson's, heart attack in , HTN, hearing difficulty, macular degeneration       Manual   2/4       Pelvic rocking    MW MW MW       STM to right QL             Trigger point release L upper trap             PA to TP of L 3-5 on R    Grade  III-IV  MW Grade  III-IV  MW Grade  III-IV  MW                      *  Exercise Diary   2/4       X-ride  10' 10' 10' 10' 10'       Lower trunk rotation 20x 5x15"ea 5x15" ea 5x15" ea 5x15"ea 5x15" ea       Bridges  10x 5" 2x10  5" hold 2x10  5" hold 2x10  5" hold 2x10  5" hold  BTB 3x10  5" hold       Transverse ab contract  nv nv 2x10  5" hold 2x10  5" hold        TA contract with marches   nv  nv         Clamshells     x15ea x15ea        Hip abd  GTB  2x10  5" hold GTB  2x10  5" hold BTB  2x10  5" hold BTB  2x10  5" hold BTB  3x10  5" hold       Hip add  2x10  5" hold 2x10  5"  hold 2x10  5" hold 3x10  5" hold 3x10  5" hold       Upper trap stretch 20"x3            Chin tucks  2x10  5" hold 2x10  5" hold 2x10  5" hold 2x10  5" hold  cues 3x10  5" hold       Open books  5x15"ea 5x15"ea 5x15" ea 5x15"ea 5x15" ea       Piriformis stretch  5x15" ea 5x15" ea 5x15" ea 5x15"ea 5x15" ea       Serratus punch  2x10 2x10 2x10 2x10 3x10       Scapular retraction seated  2x10  5" hold 2x10  5" hold 2x10  5" hold 2x10  5" hold 3x10  5" hold       No monies      RTB  2x10       Leg press      DL  3x10  40#                                                               Modalities

## 2020-02-06 ENCOUNTER — OFFICE VISIT (OUTPATIENT)
Dept: PHYSICAL THERAPY | Facility: CLINIC | Age: 75
End: 2020-02-06
Payer: OTHER GOVERNMENT

## 2020-02-06 DIAGNOSIS — G89.29 CHRONIC LEFT SHOULDER PAIN: ICD-10-CM

## 2020-02-06 DIAGNOSIS — M25.512 CHRONIC LEFT SHOULDER PAIN: ICD-10-CM

## 2020-02-06 DIAGNOSIS — M25.551 PAIN IN RIGHT HIP: Primary | ICD-10-CM

## 2020-02-06 PROCEDURE — 97110 THERAPEUTIC EXERCISES: CPT

## 2020-02-06 PROCEDURE — 97140 MANUAL THERAPY 1/> REGIONS: CPT

## 2020-02-07 ENCOUNTER — APPOINTMENT (OUTPATIENT)
Dept: PHYSICAL THERAPY | Facility: CLINIC | Age: 75
End: 2020-02-07
Payer: OTHER GOVERNMENT

## 2020-02-10 ENCOUNTER — OFFICE VISIT (OUTPATIENT)
Dept: PHYSICAL THERAPY | Facility: CLINIC | Age: 75
End: 2020-02-10
Payer: OTHER GOVERNMENT

## 2020-02-10 DIAGNOSIS — G89.29 CHRONIC LEFT SHOULDER PAIN: ICD-10-CM

## 2020-02-10 DIAGNOSIS — M25.551 PAIN IN RIGHT HIP: Primary | ICD-10-CM

## 2020-02-10 DIAGNOSIS — M25.512 CHRONIC LEFT SHOULDER PAIN: ICD-10-CM

## 2020-02-10 PROCEDURE — 97110 THERAPEUTIC EXERCISES: CPT

## 2020-02-10 PROCEDURE — 97112 NEUROMUSCULAR REEDUCATION: CPT

## 2020-02-10 NOTE — PROGRESS NOTES
Daily Note     Today's date: 2/10/2020  Patient name: Giovanni Francis  : 1945  MRN: [id number]  Referring provider: Sabino Richardson MD  Dx:   Encounter Diagnosis     ICD-10-CM    1  Pain in right hip M25 551    2  Chronic left shoulder pain M25 512     G89 [id number]        Start Time: 1400  Stop Time: 1500  Total time in clinic (min): 60 minutes    Subjective: Patient noted that his hip pain is very minimal which is an improvement  Objective: See treatment diary below      Assessment: PT introduced high level exercises to continue with improved strength and endurance  Patient continues to note less pain during ADLs  Patient required min VCs throughout the session  Patient would benefit from continued PT to allow the patient to return to his PLOF  Plan: Continue per plan of care        Precautions: Parkinson's, heart attack in , HTN, hearing difficulty, macular degeneration       Manual   2/ 2/6 2/10     Pelvic rocking    MW MW MW MW      STM to right QL             Trigger point release L upper trap             PA to TP of L 3-5 on R    Grade  III-IV  MW Grade  III-IV  MW Grade  III-IV  MW Grade  III-IV  MW                     *  Exercise Diary   2/4 2/6 /10     X-ride  10' 10' 10' 10' 10' 10' 10'     Lower trunk rotation 20x 5x15"ea 5x15" ea 5x15" ea 5x15"ea 5x15" ea 5x15" ea      Bridges  10x 5" 2x10  5" hold 2x10  5" hold 2x10  5" hold 2x10  5" hold  BTB 3x10  5" hold 3x10  5" hold  BTB 3x10  5" hold     Transverse ab contract  nv nv 2x10  5" hold 2x10  5" hold        TA contract with marches   nv  nv         Clamshells     x15ea x15ea  x20ea x20ea     Hip abd  GTB  2x10  5" hold GTB  2x10  5" hold BTB  2x10  5" hold BTB  2x10  5" hold BTB  3x10  5" hold BTB  3x10  5" hold      Hip add  2x10  5" hold 2x10  5"  hold 2x10  5" hold 3x10  5" hold 3x10  5" hold 3x10  5" hold 3x10  5" hold     Upper trap stretch 20"x3            Chin tucks  2x10  5" hold 2x10  5" hold 2x10  5" hold 2x10  5" hold  cues 3x10  5" hold 3x10  5" hold      Open books  5x15"ea 5x15"ea 5x15" ea 5x15"ea 5x15" ea 5x15" ea      Piriformis stretch  5x15" ea 5x15" ea 5x15" ea 5x15"ea 5x15" ea 5x15" ea 5x15" ea     Serratus punch  2x10 2x10 2x10 2x10 3x10 2x10  2# 2x10  2#     Scapular retraction seated  2x10  5" hold 2x10  5" hold 2x10  5" hold 2x10  5" hold 3x10  5" hold 3x10  5" hold HEP     No monies      RTB  2x10 RTB  2x10      Leg press      DL  3x10  40# DL  3x10  40# DL  2x10  50#  SL  20#  2x10 ea     Rows and extensions        Lime  2x10 ea     Standing hip flexion and abd        x15 ea (B)                                   Modalities

## 2020-02-12 ENCOUNTER — OFFICE VISIT (OUTPATIENT)
Dept: PHYSICAL THERAPY | Facility: CLINIC | Age: 75
End: 2020-02-12
Payer: OTHER GOVERNMENT

## 2020-02-12 DIAGNOSIS — G89.29 CHRONIC LEFT SHOULDER PAIN: ICD-10-CM

## 2020-02-12 DIAGNOSIS — M25.512 CHRONIC LEFT SHOULDER PAIN: ICD-10-CM

## 2020-02-12 DIAGNOSIS — M25.551 PAIN IN RIGHT HIP: Primary | ICD-10-CM

## 2020-02-12 PROCEDURE — 97110 THERAPEUTIC EXERCISES: CPT

## 2020-02-12 PROCEDURE — 97112 NEUROMUSCULAR REEDUCATION: CPT

## 2020-02-12 NOTE — PROGRESS NOTES
Daily Note     Today's date: 2020  Patient name: Herlinda Beebe  : 1945  MRN: [id number]  Referring provider: Ari Cotton MD  Dx:   Encounter Diagnosis     ICD-10-CM    1  Pain in right hip M25 551    2  Chronic left shoulder pain M25 512     G89 29        Start Time: 1401  Stop Time: 1455  Total time in clinic (min): 54 minutes    Subjective: Prior to start of session pt reports, "Overall things are good "      Objective: See treatment diary below      Assessment: Tolerated treatment well  Was able to perform all exercise with no increase of pain  Demonstrated good understanding of all assigned exercise this visit, requiring minimal to no verbal cues for proper form  Patient would benefit from continued PT      Plan: Patient and evaluating PT, Deisy Saavedra, in agreement for discharge this visit         Precautions: Parkinson's, heart attack in , HTN, hearing difficulty, macular degeneration       Manual   2/4 2/6 2/10 2/12    Pelvic rocking    MW MW MW MW      STM to right QL             Trigger point release L upper trap             PA to TP of L 3-5 on R    Grade  III-IV  MW Grade  III-IV  MW Grade  III-IV  MW Grade  III-IV  MW                     *  Exercise Diary   2/ 2/6 2/10 2/12    X-ride  10' 10' 10' 10' 10' 10' 10' 10'    Lower trunk rotation 20x 5x15"ea 5x15" ea 5x15" ea 5x15"ea 5x15" ea 5x15" ea      Bridges  10x 5" 2x10  5" hold 2x10  5" hold 2x10  5" hold 2x10  5" hold  BTB 3x10  5" hold 3x10  5" hold  BTB 3x10  5" hold 3x10  5" hold    Transverse ab contract  nv nv 2x10  5" hold 2x10  5" hold        TA contract with marches   nv  nv         Clamshells     x15ea x15ea  x20ea x20ea x20 ea    Hip abd  GTB  2x10  5" hold GTB  2x10  5" hold BTB  2x10  5" hold BTB  2x10  5" hold BTB  3x10  5" hold BTB  3x10  5" hold      Hip add  2x10  5" hold 2x10  5"  hold 2x10  5" hold 3x10  5" hold 3x10  5" hold 3x10  5" hold 3x10  5" hold 3x10  5" hold    Upper trap stretch 20"x3            Chin tucks  2x10  5" hold 2x10  5" hold 2x10  5" hold 2x10  5" hold  cues 3x10  5" hold 3x10  5" hold      Open books  5x15"ea 5x15"ea 5x15" ea 5x15"ea 5x15" ea 5x15" ea      Piriformis stretch  5x15" ea 5x15" ea 5x15" ea 5x15"ea 5x15" ea 5x15" ea 5x15" ea 15"x4 ea    Serratus punch  2x10 2x10 2x10 2x10 3x10 2x10  2# 2x10  2# 2x10  2#    Scapular retraction seated  2x10  5" hold 2x10  5" hold 2x10  5" hold 2x10  5" hold 3x10  5" hold 3x10  5" hold HEP     No monies      RTB  2x10 RTB  2x10      Leg press      DL  3x10  40# DL  3x10  40# DL  2x10  50#  SL  20#  2x10 ea DL  2x10  50#  SL  20#  2x10 ea    Rows and extensions        Lime  2x10 ea Lime  2x10 ea    Standing hip flexion and abd        x15 ea (B) x15 ea (B)                                  Modalities

## 2020-02-17 ENCOUNTER — APPOINTMENT (OUTPATIENT)
Dept: PHYSICAL THERAPY | Facility: CLINIC | Age: 75
End: 2020-02-17
Payer: OTHER GOVERNMENT

## 2020-02-19 ENCOUNTER — APPOINTMENT (OUTPATIENT)
Dept: PHYSICAL THERAPY | Facility: CLINIC | Age: 75
End: 2020-02-19
Payer: OTHER GOVERNMENT

## 2020-02-28 LAB
CREAT ?TM UR-SCNC: 89.4 UMOL/L
EXT MICROALBUMIN URINE RANDOM: 114.1
HBA1C MFR BLD HPLC: 6.3 %
MICROALBUMIN/CREAT UR: 1275.7 MG/G{CREAT}

## 2020-04-13 ENCOUNTER — TELEMEDICINE (OUTPATIENT)
Dept: SLEEP CENTER | Facility: CLINIC | Age: 75
End: 2020-04-13
Payer: COMMERCIAL

## 2020-04-13 DIAGNOSIS — Z72.821 INADEQUATE SLEEP HYGIENE: ICD-10-CM

## 2020-04-13 DIAGNOSIS — G20 PARKINSON'S DISEASE (HCC): ICD-10-CM

## 2020-04-13 DIAGNOSIS — F41.9 INSOMNIA SECONDARY TO ANXIETY: Primary | ICD-10-CM

## 2020-04-13 DIAGNOSIS — F51.05 INSOMNIA SECONDARY TO ANXIETY: Primary | ICD-10-CM

## 2020-04-13 PROCEDURE — 99214 OFFICE O/P EST MOD 30 MIN: CPT | Performed by: PSYCHIATRY & NEUROLOGY

## 2020-04-13 PROCEDURE — 1160F RVW MEDS BY RX/DR IN RCRD: CPT | Performed by: PSYCHIATRY & NEUROLOGY

## 2020-05-07 ENCOUNTER — OFFICE VISIT (OUTPATIENT)
Dept: FAMILY MEDICINE CLINIC | Facility: CLINIC | Age: 75
End: 2020-05-07
Payer: COMMERCIAL

## 2020-05-07 VITALS
DIASTOLIC BLOOD PRESSURE: 77 MMHG | HEIGHT: 64 IN | BODY MASS INDEX: 22.81 KG/M2 | TEMPERATURE: 97.7 F | HEART RATE: 86 BPM | OXYGEN SATURATION: 97 % | SYSTOLIC BLOOD PRESSURE: 143 MMHG | WEIGHT: 133.6 LBS

## 2020-05-07 DIAGNOSIS — I10 ESSENTIAL HYPERTENSION: ICD-10-CM

## 2020-05-07 DIAGNOSIS — I25.5 ISCHEMIC CARDIOMYOPATHY: ICD-10-CM

## 2020-05-07 DIAGNOSIS — D72.829 LEUKOCYTOSIS, UNSPECIFIED TYPE: ICD-10-CM

## 2020-05-07 DIAGNOSIS — E55.9 VITAMIN D DEFICIENCY: ICD-10-CM

## 2020-05-07 DIAGNOSIS — E11.42 TYPE 2 DIABETES MELLITUS WITH DIABETIC POLYNEUROPATHY, WITHOUT LONG-TERM CURRENT USE OF INSULIN (HCC): ICD-10-CM

## 2020-05-07 DIAGNOSIS — Z12.11 SCREENING FOR COLON CANCER: ICD-10-CM

## 2020-05-07 DIAGNOSIS — E78.9 LIPID DISORDER: ICD-10-CM

## 2020-05-07 DIAGNOSIS — Z00.00 MEDICARE ANNUAL WELLNESS VISIT, INITIAL: Primary | ICD-10-CM

## 2020-05-07 DIAGNOSIS — R58 ECCHYMOSIS: ICD-10-CM

## 2020-05-07 DIAGNOSIS — I65.23 BILATERAL CAROTID ARTERY STENOSIS: ICD-10-CM

## 2020-05-07 DIAGNOSIS — R80.9 PROTEINURIA, UNSPECIFIED TYPE: ICD-10-CM

## 2020-05-07 DIAGNOSIS — R97.20 PSA ELEVATION: ICD-10-CM

## 2020-05-07 DIAGNOSIS — F51.04 CHRONIC INSOMNIA: ICD-10-CM

## 2020-05-07 DIAGNOSIS — R79.89 LOW SERUM URIC ACID FOR AGE: ICD-10-CM

## 2020-05-07 DIAGNOSIS — Z23 IMMUNIZATION DUE: ICD-10-CM

## 2020-05-07 DIAGNOSIS — Z11.59 NEED FOR HEPATITIS C SCREENING TEST: ICD-10-CM

## 2020-05-07 DIAGNOSIS — F41.9 ANXIETY: ICD-10-CM

## 2020-05-07 DIAGNOSIS — R09.89 DECREASED PEDAL PULSES: ICD-10-CM

## 2020-05-07 PROCEDURE — 1036F TOBACCO NON-USER: CPT | Performed by: FAMILY MEDICINE

## 2020-05-07 PROCEDURE — 1125F AMNT PAIN NOTED PAIN PRSNT: CPT | Performed by: FAMILY MEDICINE

## 2020-05-07 PROCEDURE — 3078F DIAST BP <80 MM HG: CPT | Performed by: FAMILY MEDICINE

## 2020-05-07 PROCEDURE — 3066F NEPHROPATHY DOC TX: CPT | Performed by: UROLOGY

## 2020-05-07 PROCEDURE — 99214 OFFICE O/P EST MOD 30 MIN: CPT | Performed by: FAMILY MEDICINE

## 2020-05-07 PROCEDURE — 1160F RVW MEDS BY RX/DR IN RCRD: CPT | Performed by: FAMILY MEDICINE

## 2020-05-07 PROCEDURE — 3077F SYST BP >= 140 MM HG: CPT | Performed by: FAMILY MEDICINE

## 2020-05-07 PROCEDURE — 1170F FXNL STATUS ASSESSED: CPT | Performed by: FAMILY MEDICINE

## 2020-05-07 PROCEDURE — 3066F NEPHROPATHY DOC TX: CPT | Performed by: FAMILY MEDICINE

## 2020-05-07 PROCEDURE — 4040F PNEUMOC VAC/ADMIN/RCVD: CPT | Performed by: FAMILY MEDICINE

## 2020-05-07 PROCEDURE — G0438 PPPS, INITIAL VISIT: HCPCS | Performed by: FAMILY MEDICINE

## 2020-05-07 RX ORDER — SIMVASTATIN 80 MG
80 TABLET ORAL
COMMUNITY

## 2020-05-12 ENCOUNTER — TELEPHONE (OUTPATIENT)
Dept: UROLOGY | Facility: MEDICAL CENTER | Age: 75
End: 2020-05-12

## 2020-05-13 ENCOUNTER — APPOINTMENT (OUTPATIENT)
Dept: LAB | Facility: MEDICAL CENTER | Age: 75
End: 2020-05-13
Payer: COMMERCIAL

## 2020-05-13 DIAGNOSIS — R97.20 PSA ELEVATION: ICD-10-CM

## 2020-05-13 DIAGNOSIS — F51.04 CHRONIC INSOMNIA: ICD-10-CM

## 2020-05-13 DIAGNOSIS — E55.9 VITAMIN D DEFICIENCY: ICD-10-CM

## 2020-05-13 DIAGNOSIS — R79.89 LOW SERUM URIC ACID FOR AGE: ICD-10-CM

## 2020-05-13 LAB
25(OH)D3 SERPL-MCNC: 41.4 NG/ML (ref 30–100)
CREAT UR-MCNC: 35 MG/DL
PROT UR-MCNC: 70 MG/DL
PROT/CREAT UR: 2 MG/G{CREAT} (ref 0–0.1)
TSH SERPL DL<=0.05 MIU/L-ACNC: 1.09 UIU/ML (ref 0.36–3.74)
URATE SERPL-MCNC: 7.3 MG/DL (ref 4.2–8)

## 2020-05-13 PROCEDURE — 84153 ASSAY OF PSA TOTAL: CPT

## 2020-05-13 PROCEDURE — 84156 ASSAY OF PROTEIN URINE: CPT | Performed by: FAMILY MEDICINE

## 2020-05-13 PROCEDURE — 84550 ASSAY OF BLOOD/URIC ACID: CPT

## 2020-05-13 PROCEDURE — 84443 ASSAY THYROID STIM HORMONE: CPT

## 2020-05-13 PROCEDURE — 36415 COLL VENOUS BLD VENIPUNCTURE: CPT

## 2020-05-13 PROCEDURE — 84154 ASSAY OF PSA FREE: CPT

## 2020-05-13 PROCEDURE — 82570 ASSAY OF URINE CREATININE: CPT | Performed by: FAMILY MEDICINE

## 2020-05-13 PROCEDURE — 82306 VITAMIN D 25 HYDROXY: CPT

## 2020-05-14 LAB
PSA FREE MFR SERPL: 29 %
PSA FREE SERPL-MCNC: 1.42 NG/ML
PSA SERPL-MCNC: 4.9 NG/ML (ref 0–4)

## 2020-05-15 ENCOUNTER — TELEPHONE (OUTPATIENT)
Dept: FAMILY MEDICINE CLINIC | Facility: CLINIC | Age: 75
End: 2020-05-15

## 2020-05-15 DIAGNOSIS — G47.00 INSOMNIA DISORDER, WITH OTHER SLEEP DISORDER, RECURRENT: ICD-10-CM

## 2020-05-15 DIAGNOSIS — G47.8 INSOMNIA DISORDER, WITH OTHER SLEEP DISORDER, RECURRENT: ICD-10-CM

## 2020-05-15 RX ORDER — LORAZEPAM 0.5 MG/1
TABLET ORAL
Qty: 90 TABLET | Refills: 2 | Status: SHIPPED | OUTPATIENT
Start: 2020-05-15 | End: 2020-09-14

## 2020-05-18 ENCOUNTER — TELEPHONE (OUTPATIENT)
Dept: FAMILY MEDICINE CLINIC | Facility: CLINIC | Age: 75
End: 2020-05-18

## 2020-05-18 DIAGNOSIS — R77.9 ELEVATED SERUM PROTEIN LEVEL: Primary | ICD-10-CM

## 2020-05-19 ENCOUNTER — TELEPHONE (OUTPATIENT)
Dept: FAMILY MEDICINE CLINIC | Facility: CLINIC | Age: 75
End: 2020-05-19

## 2020-05-19 DIAGNOSIS — R80.9 PROTEINURIA, UNSPECIFIED TYPE: Primary | ICD-10-CM

## 2020-05-21 ENCOUNTER — APPOINTMENT (OUTPATIENT)
Dept: LAB | Facility: MEDICAL CENTER | Age: 75
End: 2020-05-21
Payer: COMMERCIAL

## 2020-05-21 DIAGNOSIS — R80.9 PROTEINURIA, UNSPECIFIED TYPE: ICD-10-CM

## 2020-05-21 LAB
PROT 24H UR-MCNC: 885.5 MG/24 HRS (ref 40–150)
SPECIMEN VOL UR: 1150 ML

## 2020-05-21 PROCEDURE — 84156 ASSAY OF PROTEIN URINE: CPT

## 2020-06-03 ENCOUNTER — TELEPHONE (OUTPATIENT)
Dept: FAMILY MEDICINE CLINIC | Facility: CLINIC | Age: 75
End: 2020-06-03

## 2020-06-05 ENCOUNTER — TELEPHONE (OUTPATIENT)
Dept: FAMILY MEDICINE CLINIC | Facility: CLINIC | Age: 75
End: 2020-06-05

## 2020-06-05 ENCOUNTER — OFFICE VISIT (OUTPATIENT)
Dept: FAMILY MEDICINE CLINIC | Facility: CLINIC | Age: 75
End: 2020-06-05
Payer: COMMERCIAL

## 2020-06-05 VITALS
HEIGHT: 64 IN | SYSTOLIC BLOOD PRESSURE: 110 MMHG | BODY MASS INDEX: 22.36 KG/M2 | TEMPERATURE: 98 F | HEART RATE: 71 BPM | DIASTOLIC BLOOD PRESSURE: 74 MMHG | WEIGHT: 131 LBS | OXYGEN SATURATION: 98 %

## 2020-06-05 DIAGNOSIS — E11.42 TYPE 2 DIABETES MELLITUS WITH DIABETIC POLYNEUROPATHY, WITHOUT LONG-TERM CURRENT USE OF INSULIN (HCC): ICD-10-CM

## 2020-06-05 DIAGNOSIS — H91.93 DECREASED HEARING OF BOTH EARS: Primary | ICD-10-CM

## 2020-06-05 DIAGNOSIS — H61.23 BILATERAL IMPACTED CERUMEN: ICD-10-CM

## 2020-06-05 DIAGNOSIS — R80.9 PROTEINURIA, UNSPECIFIED TYPE: Primary | ICD-10-CM

## 2020-06-05 LAB — SL AMB POCT HEMOGLOBIN AIC: 6.2 (ref ?–6.5)

## 2020-06-05 PROCEDURE — 3044F HG A1C LEVEL LT 7.0%: CPT | Performed by: UROLOGY

## 2020-06-05 PROCEDURE — 4040F PNEUMOC VAC/ADMIN/RCVD: CPT | Performed by: FAMILY MEDICINE

## 2020-06-05 PROCEDURE — 83036 HEMOGLOBIN GLYCOSYLATED A1C: CPT | Performed by: FAMILY MEDICINE

## 2020-06-05 PROCEDURE — 3066F NEPHROPATHY DOC TX: CPT | Performed by: FAMILY MEDICINE

## 2020-06-05 PROCEDURE — 3074F SYST BP LT 130 MM HG: CPT | Performed by: FAMILY MEDICINE

## 2020-06-05 PROCEDURE — 1036F TOBACCO NON-USER: CPT | Performed by: FAMILY MEDICINE

## 2020-06-05 PROCEDURE — 3008F BODY MASS INDEX DOCD: CPT | Performed by: FAMILY MEDICINE

## 2020-06-05 PROCEDURE — 1170F FXNL STATUS ASSESSED: CPT | Performed by: FAMILY MEDICINE

## 2020-06-05 PROCEDURE — 69209 REMOVE IMPACTED EAR WAX UNI: CPT | Performed by: FAMILY MEDICINE

## 2020-06-05 PROCEDURE — 1160F RVW MEDS BY RX/DR IN RCRD: CPT | Performed by: FAMILY MEDICINE

## 2020-06-05 PROCEDURE — 99213 OFFICE O/P EST LOW 20 MIN: CPT | Performed by: FAMILY MEDICINE

## 2020-06-05 PROCEDURE — 3044F HG A1C LEVEL LT 7.0%: CPT | Performed by: FAMILY MEDICINE

## 2020-06-05 PROCEDURE — 3078F DIAST BP <80 MM HG: CPT | Performed by: FAMILY MEDICINE

## 2020-06-10 ENCOUNTER — APPOINTMENT (OUTPATIENT)
Dept: LAB | Facility: MEDICAL CENTER | Age: 75
End: 2020-06-10
Payer: COMMERCIAL

## 2020-06-10 DIAGNOSIS — R80.9 PROTEINURIA, UNSPECIFIED TYPE: ICD-10-CM

## 2020-06-10 LAB
C3 SERPL-MCNC: 123 MG/DL (ref 90–180)
C4 SERPL-MCNC: 29 MG/DL (ref 10–40)
HBV SURFACE AG SER QL: NORMAL
HCV AB SER QL: NORMAL
MAGNESIUM SERPL-MCNC: 1.8 MG/DL (ref 1.6–2.6)

## 2020-06-10 PROCEDURE — 36415 COLL VENOUS BLD VENIPUNCTURE: CPT

## 2020-06-10 PROCEDURE — 86038 ANTINUCLEAR ANTIBODIES: CPT

## 2020-06-10 PROCEDURE — 87340 HEPATITIS B SURFACE AG IA: CPT

## 2020-06-10 PROCEDURE — 86803 HEPATITIS C AB TEST: CPT

## 2020-06-10 PROCEDURE — 84166 PROTEIN E-PHORESIS/URINE/CSF: CPT

## 2020-06-10 PROCEDURE — 83735 ASSAY OF MAGNESIUM: CPT

## 2020-06-10 PROCEDURE — 86162 COMPLEMENT TOTAL (CH50): CPT

## 2020-06-10 PROCEDURE — 84166 PROTEIN E-PHORESIS/URINE/CSF: CPT | Performed by: PATHOLOGY

## 2020-06-10 PROCEDURE — 86592 SYPHILIS TEST NON-TREP QUAL: CPT

## 2020-06-10 PROCEDURE — 86160 COMPLEMENT ANTIGEN: CPT

## 2020-06-11 LAB
CH50 SERPL-ACNC: >60 U/ML
RPR SER QL: NORMAL

## 2020-06-12 ENCOUNTER — TELEPHONE (OUTPATIENT)
Dept: FAMILY MEDICINE CLINIC | Facility: CLINIC | Age: 75
End: 2020-06-12

## 2020-06-12 LAB
ALBUMIN UR ELPH-MCNC: 82.7 %
ALPHA1 GLOB MFR UR ELPH: 3.4 %
ALPHA2 GLOB MFR UR ELPH: 3.5 %
B-GLOBULIN MFR UR ELPH: 5.2 %
GAMMA GLOB MFR UR ELPH: 5.2 %
PROT PATTERN UR ELPH-IMP: ABNORMAL
PROT UR-MCNC: 105 MG/DL
RYE IGE QN: NEGATIVE

## 2020-06-26 ENCOUNTER — HOSPITAL ENCOUNTER (OUTPATIENT)
Dept: ULTRASOUND IMAGING | Facility: MEDICAL CENTER | Age: 75
Discharge: HOME | End: 2020-06-26
Payer: COMMERCIAL

## 2020-06-26 DIAGNOSIS — R80.9 PROTEINURIA, UNSPECIFIED TYPE: ICD-10-CM

## 2020-06-26 PROCEDURE — 76770 US EXAM ABDO BACK WALL COMP: CPT

## 2020-07-01 ENCOUNTER — TELEPHONE (OUTPATIENT)
Dept: FAMILY MEDICINE CLINIC | Facility: CLINIC | Age: 75
End: 2020-07-01

## 2020-07-01 NOTE — TELEPHONE ENCOUNTER
Renal and bladder ultrasound  Patient has bilateral renal cysts  Abnormal ultrasound of the bladder  Enlarged prostate  Referred patient to nephrology for proteinuria  Neurology for abnormal ultrasound of the bladder

## 2020-07-06 ENCOUNTER — TELEPHONE (OUTPATIENT)
Dept: UROLOGY | Facility: MEDICAL CENTER | Age: 75
End: 2020-07-06

## 2020-07-06 DIAGNOSIS — N20.0 NEPHROLITHIASIS: Primary | ICD-10-CM

## 2020-07-06 NOTE — TELEPHONE ENCOUNTER
Spoke to patient and advised to call Dr Antonino Francis to make an appointment it looks like he is [facility] he should be able to see the test results

## 2020-07-06 NOTE — TELEPHONE ENCOUNTER
Patient of Dr Demetrio Escobedo seen in the [facility] office  Patient called in regard to enlarged prostate noted with testing by PCP  Patient to be seen 7/24 am  Xray usually done prior to appointment  Please call at  to advise when the order is in Epic  Thank you

## 2020-07-06 NOTE — TELEPHONE ENCOUNTER
Call placed to patient and LMOM  Informed patient in message that XRAY order has been placed by [facility] and he can have that done prior to his appointment on 7/24/2020 with Dr Stacie Hawkins  Office number provided for patient to call back with any questions or concerns

## 2020-07-13 ENCOUNTER — APPOINTMENT (OUTPATIENT)
Dept: RADIOLOGY | Facility: MEDICAL CENTER | Age: 75
End: 2020-07-13
Payer: COMMERCIAL

## 2020-07-13 DIAGNOSIS — N20.0 NEPHROLITHIASIS: ICD-10-CM

## 2020-07-13 PROCEDURE — 74018 RADEX ABDOMEN 1 VIEW: CPT

## 2020-07-24 ENCOUNTER — OFFICE VISIT (OUTPATIENT)
Dept: UROLOGY | Facility: MEDICAL CENTER | Age: 75
End: 2020-07-24
Payer: COMMERCIAL

## 2020-07-24 VITALS
TEMPERATURE: 97.1 F | HEIGHT: 64 IN | BODY MASS INDEX: 22.71 KG/M2 | SYSTOLIC BLOOD PRESSURE: 124 MMHG | WEIGHT: 133 LBS | DIASTOLIC BLOOD PRESSURE: 72 MMHG

## 2020-07-24 DIAGNOSIS — R97.20 ELEVATED PROSTATE SPECIFIC ANTIGEN (PSA): ICD-10-CM

## 2020-07-24 DIAGNOSIS — N20.0 RENAL CALCULUS: Primary | ICD-10-CM

## 2020-07-24 DIAGNOSIS — N40.1 BPH WITH URINARY OBSTRUCTION: ICD-10-CM

## 2020-07-24 DIAGNOSIS — N13.8 BPH WITH URINARY OBSTRUCTION: ICD-10-CM

## 2020-07-24 LAB
SL AMB  POCT GLUCOSE, UA: NORMAL
SL AMB LEUKOCYTE ESTERASE,UA: NORMAL
SL AMB POCT BILIRUBIN,UA: NORMAL
SL AMB POCT BLOOD,UA: NORMAL
SL AMB POCT CLARITY,UA: CLEAR
SL AMB POCT COLOR,UA: YELLOW
SL AMB POCT KETONES,UA: NORMAL
SL AMB POCT NITRITE,UA: NORMAL
SL AMB POCT PH,UA: 6
SL AMB POCT SPECIFIC GRAVITY,UA: 1
SL AMB POCT URINE PROTEIN: NORMAL
SL AMB POCT UROBILINOGEN: 0.2

## 2020-07-24 PROCEDURE — 81003 URINALYSIS AUTO W/O SCOPE: CPT | Performed by: UROLOGY

## 2020-07-24 PROCEDURE — 1036F TOBACCO NON-USER: CPT | Performed by: UROLOGY

## 2020-07-24 PROCEDURE — 1160F RVW MEDS BY RX/DR IN RCRD: CPT | Performed by: UROLOGY

## 2020-07-24 PROCEDURE — 3008F BODY MASS INDEX DOCD: CPT | Performed by: UROLOGY

## 2020-07-24 PROCEDURE — 4040F PNEUMOC VAC/ADMIN/RCVD: CPT | Performed by: UROLOGY

## 2020-07-24 PROCEDURE — 3074F SYST BP LT 130 MM HG: CPT | Performed by: UROLOGY

## 2020-07-24 PROCEDURE — 3061F NEG MICROALBUMINURIA REV: CPT | Performed by: UROLOGY

## 2020-07-24 PROCEDURE — 99214 OFFICE O/P EST MOD 30 MIN: CPT | Performed by: UROLOGY

## 2020-07-24 PROCEDURE — 3066F NEPHROPATHY DOC TX: CPT | Performed by: UROLOGY

## 2020-07-24 PROCEDURE — 3044F HG A1C LEVEL LT 7.0%: CPT | Performed by: UROLOGY

## 2020-07-24 PROCEDURE — 3078F DIAST BP <80 MM HG: CPT | Performed by: UROLOGY

## 2020-07-24 NOTE — PROGRESS NOTES
HISTORY:    1  Follow-up mild BPH which is not really bothered him much in the past couple years  2 History of stones, and so recent evaluation with KUB and ultrasound was done     the reports do not agree:  KUB says likely 9 mm stone, ultrasound says no stone, just renal cysts  3  Elevated PSA of 6 7 December 2018  May 2020, PSA 4 9             ASSESSMENT / PLAN:     CT scan to truly determine if there is a kidney stone    He is not bothered by BPH symptoms    At age 77, I am not concerned about his PSA of 4 9  Follow-up two years    The following portions of the patient's history were reviewed and updated as appropriate: allergies, current medications, past family history, past medical history, past social history, past surgical history and problem list     Review of Systems   All other systems reviewed and are negative          Objective:     Physical Exam      0   Lab Value Date/Time    PSA 4 9 (H) 05/13/2020 1405    PSA 6 7 (H) 12/05/2018 1109   ]  No results found for: BUN  No results found for: CREATININE  No components found for: CBC      Patient Active Problem List   Diagnosis    TAVARES (acute kidney injury) ([address] )    Anemia    Anxiety    Back pain    Coronary atherosclerosis    Diverticulosis of colon    BPH with urinary obstruction    Essential hypertension    Fracture of C1 vertebra, closed ([address] )    Glenohumeral arthritis, right    Hearing loss    History of disease of blood and blood-forming organs    History of renal stone    Insomnia secondary to anxiety    Irritable bowel syndrome    Ischemic cardiomyopathy    [facility]    Lipid disorder    Renal cyst    Proteinuria    Sacral fracture ([facility])    Type 2 diabetes mellitus with diabetic polyneuropathy ([facility])    Vitamin D deficiency    Hematuria    Inadequate sleep hygiene    Multiple closed fractures of ribs of left side    Bilateral carotid artery stenosis    Leukocytosis    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    Elevated prostate specific antigen (PSA)    Low serum uric acid for age        Diagnoses and all orders for this visit:    Renal calculus  -     POCT urine dip auto non-scope  -     CT abdomen pelvis wo contrast; Future    BPH with urinary obstruction    Elevated prostate specific antigen (PSA)    Other orders  -     Multiple Vitamins-Minerals (PRESERVISION AREDS PO); Take by mouth           Patient ID: Denisa Atkins is a 77 y o  male        Current Outpatient Medications:     carbidopa-levodopa (SINEMET CR)  mg TBCR per ER tablet, Take 1 tablet by mouth 3 (three) times a day, Disp: , Rfl:     glucose blood (FREESTYLE TEST STRIPS) test strip, Test once daily and pRN Dx 250 00, Disp: , Rfl:     lisinopril (ZESTRIL) 2 5 mg tablet, Take 2 5 mg by mouth, Disp: , Rfl:     metFORMIN (GLUCOPHAGE) 1000 MG tablet, Take 1,000 mg by mouth, Disp: , Rfl:     metoprolol succinate (TOPROL-XL) 50 mg 24 hr tablet, Take 50 mg by mouth, Disp: , Rfl:     Multiple Vitamins-Minerals (PRESERVISION AREDS PO), Take by mouth, Disp: , Rfl:     multivitamin (THERAGRAN) TABS, Take 1 tablet by mouth, Disp: , Rfl:     simvastatin (ZOCOR) 80 mg tablet, Take 80 mg by mouth, Disp: , Rfl:     CHOLECALCIFEROL PO, Take by mouth, Disp: , Rfl:     CYANOCOBALAMIN PO, Take by mouth, Disp: , Rfl:     fluticasone (FLONASE) 50 mcg/act nasal spray, 1 spray into each nostril daily, Disp: , Rfl:     LORazepam (ATIVAN) 0 5 mg tablet, take 2 tablet by mouth at bedtime AND 1 TABLET BY MOUTH EARLIER IN THE DAY AS NEEDED FOR ANXIETY (Patient not taking: Reported on 7/24/2020), Disp: 90 tablet, Rfl: 2    melatonin 3 mg, Take 4 5 mg by mouth, Disp: , Rfl:     Past Medical History:   Diagnosis Date    Anxiety     Arthritis     BPH with obstruction/lower urinary tract symptoms     Diabetes mellitus ([facility]     Hypertension     Renal calculi     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 10/15/2019    Ureter, calculus     Urinary stone        Past Surgical History:   Procedure Laterality Date    CARDIAC SURGERY      CHOLECYSTECTOMY      CYSTOSCOPY W/ URETEROSCOPY         Social History

## 2020-08-03 ENCOUNTER — HOSPITAL ENCOUNTER (OUTPATIENT)
Dept: CT IMAGING | Facility: HOSPITAL | Age: 75
Discharge: HOME | End: 2020-08-03
Attending: UROLOGY
Payer: COMMERCIAL

## 2020-08-03 DIAGNOSIS — N20.0 RENAL CALCULUS: ICD-10-CM

## 2020-08-03 PROCEDURE — 74176 CT ABD & PELVIS W/O CONTRAST: CPT

## 2020-08-10 ENCOUNTER — TELEPHONE (OUTPATIENT)
Dept: UROLOGY | Facility: MEDICAL CENTER | Age: 75
End: 2020-08-10

## 2020-08-10 NOTE — TELEPHONE ENCOUNTER
Call placed to patient in regards to his CT results  Message left asking patient to call our office back with office contact information provided  ----- Message from Priti Yu MD sent at 8/7/2020  4:12 PM EDT -----  Tell pt:   No stones, only finding was mild prostate enlargement that we are aware of

## 2020-08-10 NOTE — TELEPHONE ENCOUNTER
Patient called into the office for results  I made him aware that per Dr Ogden there were no stones seen on CT but there was mild prostate enlargement seen but patient was already aware of this          ----- Message from Tahira Dietrich MD sent at 8/7/2020  4:12 PM EDT -----  Tell pt:   No stones, only finding was mild prostate enlargement that we are aware of

## 2020-09-14 DIAGNOSIS — G47.8 INSOMNIA DISORDER, WITH OTHER SLEEP DISORDER, RECURRENT: ICD-10-CM

## 2020-09-14 DIAGNOSIS — G47.00 INSOMNIA DISORDER, WITH OTHER SLEEP DISORDER, RECURRENT: ICD-10-CM

## 2020-09-14 RX ORDER — LORAZEPAM 0.5 MG/1
TABLET ORAL
Qty: 90 TABLET | Refills: 1 | Status: SHIPPED | OUTPATIENT
Start: 2020-09-14 | End: 2020-11-16

## 2020-10-01 ENCOUNTER — TRANSCRIBE ORDERS (OUTPATIENT)
Dept: ADMINISTRATIVE | Facility: HOSPITAL | Age: 75
End: 2020-10-01

## 2020-10-01 ENCOUNTER — APPOINTMENT (OUTPATIENT)
Dept: RADIOLOGY | Facility: MEDICAL CENTER | Age: 75
End: 2020-10-01
Payer: COMMERCIAL

## 2020-10-01 DIAGNOSIS — M25.551 PAIN IN RIGHT HIP: Primary | ICD-10-CM

## 2020-10-01 DIAGNOSIS — M25.551 PAIN IN RIGHT HIP: ICD-10-CM

## 2020-10-01 PROCEDURE — 73502 X-RAY EXAM HIP UNI 2-3 VIEWS: CPT

## 2020-10-05 ENCOUNTER — OFFICE VISIT (OUTPATIENT)
Dept: SLEEP CENTER | Facility: CLINIC | Age: 75
End: 2020-10-05
Payer: COMMERCIAL

## 2020-10-05 VITALS
OXYGEN SATURATION: 99 % | DIASTOLIC BLOOD PRESSURE: 74 MMHG | HEIGHT: 64 IN | WEIGHT: 134.6 LBS | HEART RATE: 88 BPM | SYSTOLIC BLOOD PRESSURE: 128 MMHG | BODY MASS INDEX: 22.98 KG/M2

## 2020-10-05 DIAGNOSIS — F41.9 ANXIETY: ICD-10-CM

## 2020-10-05 DIAGNOSIS — F41.9 INSOMNIA SECONDARY TO ANXIETY: Primary | ICD-10-CM

## 2020-10-05 DIAGNOSIS — Z72.821 INADEQUATE SLEEP HYGIENE: ICD-10-CM

## 2020-10-05 DIAGNOSIS — F51.05 INSOMNIA SECONDARY TO ANXIETY: Primary | ICD-10-CM

## 2020-10-05 PROCEDURE — 1160F RVW MEDS BY RX/DR IN RCRD: CPT | Performed by: PSYCHIATRY & NEUROLOGY

## 2020-10-05 PROCEDURE — 99215 OFFICE O/P EST HI 40 MIN: CPT | Performed by: PSYCHIATRY & NEUROLOGY

## 2020-10-05 PROCEDURE — 1036F TOBACCO NON-USER: CPT | Performed by: PSYCHIATRY & NEUROLOGY

## 2020-10-05 PROCEDURE — 3078F DIAST BP <80 MM HG: CPT | Performed by: PSYCHIATRY & NEUROLOGY

## 2020-11-13 ENCOUNTER — EVALUATION (OUTPATIENT)
Dept: PHYSICAL THERAPY | Facility: CLINIC | Age: 75
End: 2020-11-13
Payer: COMMERCIAL

## 2020-11-13 DIAGNOSIS — M25.551 RIGHT HIP PAIN: Primary | ICD-10-CM

## 2020-11-13 DIAGNOSIS — G47.00 INSOMNIA DISORDER, WITH OTHER SLEEP DISORDER, RECURRENT: ICD-10-CM

## 2020-11-13 DIAGNOSIS — G47.8 INSOMNIA DISORDER, WITH OTHER SLEEP DISORDER, RECURRENT: ICD-10-CM

## 2020-11-13 PROCEDURE — 97110 THERAPEUTIC EXERCISES: CPT | Performed by: PHYSICAL THERAPIST

## 2020-11-13 PROCEDURE — 97162 PT EVAL MOD COMPLEX 30 MIN: CPT | Performed by: PHYSICAL THERAPIST

## 2020-11-16 ENCOUNTER — TRANSCRIBE ORDERS (OUTPATIENT)
Dept: PHYSICAL THERAPY | Facility: CLINIC | Age: 75
End: 2020-11-16

## 2020-11-16 ENCOUNTER — OFFICE VISIT (OUTPATIENT)
Dept: PHYSICAL THERAPY | Facility: CLINIC | Age: 75
End: 2020-11-16
Payer: COMMERCIAL

## 2020-11-16 DIAGNOSIS — M25.551 RIGHT HIP PAIN: Primary | ICD-10-CM

## 2020-11-16 PROCEDURE — 97110 THERAPEUTIC EXERCISES: CPT

## 2020-11-16 PROCEDURE — 97140 MANUAL THERAPY 1/> REGIONS: CPT

## 2020-11-16 RX ORDER — LORAZEPAM 0.5 MG/1
TABLET ORAL
Qty: 90 TABLET | Refills: 1 | Status: SHIPPED | OUTPATIENT
Start: 2020-11-16 | End: 2021-01-14

## 2020-11-19 ENCOUNTER — OFFICE VISIT (OUTPATIENT)
Dept: PHYSICAL THERAPY | Facility: CLINIC | Age: 75
End: 2020-11-19
Payer: COMMERCIAL

## 2020-11-19 DIAGNOSIS — M25.551 RIGHT HIP PAIN: Primary | ICD-10-CM

## 2020-11-19 PROCEDURE — 97140 MANUAL THERAPY 1/> REGIONS: CPT

## 2020-11-19 PROCEDURE — 97110 THERAPEUTIC EXERCISES: CPT

## 2020-11-23 ENCOUNTER — OFFICE VISIT (OUTPATIENT)
Dept: PHYSICAL THERAPY | Facility: CLINIC | Age: 75
End: 2020-11-23
Payer: COMMERCIAL

## 2020-11-23 DIAGNOSIS — M25.551 RIGHT HIP PAIN: Primary | ICD-10-CM

## 2020-11-23 PROCEDURE — 97110 THERAPEUTIC EXERCISES: CPT | Performed by: PHYSICAL THERAPIST

## 2020-11-25 ENCOUNTER — OFFICE VISIT (OUTPATIENT)
Dept: PHYSICAL THERAPY | Facility: CLINIC | Age: 75
End: 2020-11-25
Payer: COMMERCIAL

## 2020-11-25 DIAGNOSIS — M25.551 RIGHT HIP PAIN: Primary | ICD-10-CM

## 2020-11-25 PROCEDURE — 97140 MANUAL THERAPY 1/> REGIONS: CPT | Performed by: PHYSICAL THERAPIST

## 2020-11-25 PROCEDURE — 97112 NEUROMUSCULAR REEDUCATION: CPT | Performed by: PHYSICAL THERAPIST

## 2020-11-25 PROCEDURE — 97110 THERAPEUTIC EXERCISES: CPT | Performed by: PHYSICAL THERAPIST

## 2020-11-27 ENCOUNTER — VBI (OUTPATIENT)
Dept: ADMINISTRATIVE | Facility: OTHER | Age: 75
End: 2020-11-27

## 2020-11-30 ENCOUNTER — OFFICE VISIT (OUTPATIENT)
Dept: PHYSICAL THERAPY | Facility: CLINIC | Age: 75
End: 2020-11-30
Payer: COMMERCIAL

## 2020-11-30 DIAGNOSIS — M25.551 RIGHT HIP PAIN: Primary | ICD-10-CM

## 2020-11-30 PROCEDURE — 97112 NEUROMUSCULAR REEDUCATION: CPT

## 2020-11-30 PROCEDURE — 97140 MANUAL THERAPY 1/> REGIONS: CPT

## 2020-11-30 PROCEDURE — 97110 THERAPEUTIC EXERCISES: CPT

## 2020-12-03 ENCOUNTER — OFFICE VISIT (OUTPATIENT)
Dept: PHYSICAL THERAPY | Facility: CLINIC | Age: 75
End: 2020-12-03
Payer: COMMERCIAL

## 2020-12-03 DIAGNOSIS — M25.551 RIGHT HIP PAIN: Primary | ICD-10-CM

## 2020-12-03 PROCEDURE — 97110 THERAPEUTIC EXERCISES: CPT

## 2020-12-03 PROCEDURE — 97140 MANUAL THERAPY 1/> REGIONS: CPT

## 2020-12-03 PROCEDURE — 97112 NEUROMUSCULAR REEDUCATION: CPT

## 2020-12-07 ENCOUNTER — OFFICE VISIT (OUTPATIENT)
Dept: PHYSICAL THERAPY | Facility: CLINIC | Age: 75
End: 2020-12-07
Payer: COMMERCIAL

## 2020-12-07 DIAGNOSIS — M25.551 RIGHT HIP PAIN: Primary | ICD-10-CM

## 2020-12-07 PROCEDURE — 97112 NEUROMUSCULAR REEDUCATION: CPT | Performed by: PHYSICAL THERAPIST

## 2020-12-07 PROCEDURE — 97140 MANUAL THERAPY 1/> REGIONS: CPT | Performed by: PHYSICAL THERAPIST

## 2020-12-10 ENCOUNTER — OFFICE VISIT (OUTPATIENT)
Dept: PHYSICAL THERAPY | Facility: CLINIC | Age: 75
End: 2020-12-10
Payer: COMMERCIAL

## 2020-12-10 DIAGNOSIS — M25.551 RIGHT HIP PAIN: Primary | ICD-10-CM

## 2020-12-10 PROCEDURE — 97110 THERAPEUTIC EXERCISES: CPT

## 2020-12-10 PROCEDURE — 97112 NEUROMUSCULAR REEDUCATION: CPT

## 2020-12-14 ENCOUNTER — OFFICE VISIT (OUTPATIENT)
Dept: PHYSICAL THERAPY | Facility: CLINIC | Age: 75
End: 2020-12-14
Payer: COMMERCIAL

## 2020-12-14 DIAGNOSIS — M25.551 RIGHT HIP PAIN: Primary | ICD-10-CM

## 2020-12-14 PROCEDURE — 97140 MANUAL THERAPY 1/> REGIONS: CPT

## 2020-12-14 PROCEDURE — 97112 NEUROMUSCULAR REEDUCATION: CPT

## 2020-12-17 ENCOUNTER — APPOINTMENT (OUTPATIENT)
Dept: PHYSICAL THERAPY | Facility: CLINIC | Age: 75
End: 2020-12-17
Payer: COMMERCIAL

## 2020-12-18 ENCOUNTER — OFFICE VISIT (OUTPATIENT)
Dept: PHYSICAL THERAPY | Facility: CLINIC | Age: 75
End: 2020-12-18
Payer: COMMERCIAL

## 2020-12-18 DIAGNOSIS — M25.551 RIGHT HIP PAIN: Primary | ICD-10-CM

## 2020-12-18 PROCEDURE — 97110 THERAPEUTIC EXERCISES: CPT | Performed by: PHYSICAL THERAPIST

## 2020-12-18 PROCEDURE — 97140 MANUAL THERAPY 1/> REGIONS: CPT | Performed by: PHYSICAL THERAPIST

## 2020-12-21 ENCOUNTER — OFFICE VISIT (OUTPATIENT)
Dept: PHYSICAL THERAPY | Facility: CLINIC | Age: 75
End: 2020-12-21
Payer: COMMERCIAL

## 2020-12-21 DIAGNOSIS — M25.551 RIGHT HIP PAIN: Primary | ICD-10-CM

## 2020-12-21 PROCEDURE — 97110 THERAPEUTIC EXERCISES: CPT

## 2020-12-21 PROCEDURE — 97140 MANUAL THERAPY 1/> REGIONS: CPT

## 2020-12-21 PROCEDURE — 97112 NEUROMUSCULAR REEDUCATION: CPT

## 2020-12-23 ENCOUNTER — OFFICE VISIT (OUTPATIENT)
Dept: PHYSICAL THERAPY | Facility: CLINIC | Age: 75
End: 2020-12-23
Payer: COMMERCIAL

## 2020-12-23 DIAGNOSIS — M25.551 RIGHT HIP PAIN: Primary | ICD-10-CM

## 2020-12-23 PROCEDURE — 97112 NEUROMUSCULAR REEDUCATION: CPT

## 2020-12-23 PROCEDURE — 97110 THERAPEUTIC EXERCISES: CPT

## 2020-12-23 PROCEDURE — 97140 MANUAL THERAPY 1/> REGIONS: CPT

## 2020-12-28 ENCOUNTER — OFFICE VISIT (OUTPATIENT)
Dept: PHYSICAL THERAPY | Facility: CLINIC | Age: 75
End: 2020-12-28
Payer: COMMERCIAL

## 2020-12-28 DIAGNOSIS — M25.551 RIGHT HIP PAIN: Primary | ICD-10-CM

## 2020-12-28 PROCEDURE — 97110 THERAPEUTIC EXERCISES: CPT

## 2020-12-30 ENCOUNTER — OFFICE VISIT (OUTPATIENT)
Dept: PHYSICAL THERAPY | Facility: CLINIC | Age: 75
End: 2020-12-30
Payer: COMMERCIAL

## 2020-12-30 ENCOUNTER — TRANSCRIBE ORDERS (OUTPATIENT)
Dept: PHYSICAL THERAPY | Facility: CLINIC | Age: 75
End: 2020-12-30

## 2020-12-30 DIAGNOSIS — M25.551 RIGHT HIP PAIN: Primary | ICD-10-CM

## 2020-12-30 PROCEDURE — 97140 MANUAL THERAPY 1/> REGIONS: CPT

## 2020-12-30 PROCEDURE — 97110 THERAPEUTIC EXERCISES: CPT

## 2021-01-13 DIAGNOSIS — G47.8 INSOMNIA DISORDER, WITH OTHER SLEEP DISORDER, RECURRENT: ICD-10-CM

## 2021-01-13 DIAGNOSIS — G47.00 INSOMNIA DISORDER, WITH OTHER SLEEP DISORDER, RECURRENT: ICD-10-CM

## 2021-01-14 RX ORDER — LORAZEPAM 0.5 MG/1
TABLET ORAL
Qty: 90 TABLET | Refills: 1 | Status: SHIPPED | OUTPATIENT
Start: 2021-01-14 | End: 2021-03-14

## 2021-01-15 ENCOUNTER — TELEPHONE (OUTPATIENT)
Dept: FAMILY MEDICINE CLINIC | Facility: CLINIC | Age: 76
End: 2021-01-15

## 2021-01-15 ENCOUNTER — TELEPHONE (OUTPATIENT)
Dept: ADMINISTRATIVE | Facility: OTHER | Age: 76
End: 2021-01-15

## 2021-01-15 LAB
LEFT EYE DIABETIC RETINOPATHY: NORMAL
RIGHT EYE DIABETIC RETINOPATHY: NORMAL

## 2021-01-15 NOTE — TELEPHONE ENCOUNTER
----- Message from Kike Phillips sent at 1/15/2021 10:18 AM EST -----  Regarding: DM Eye Exam  01/15/21 10:18 AM    Hello, our patient Matmimaryam  has had Diabetic Eye Exam completed/performed  Please assist in updating the patient chart by pulling the document from the Media Tab  The date of service is 1/15/2021       Thank you,  REGULO NELSON   [facility]

## 2021-01-15 NOTE — TELEPHONE ENCOUNTER
Upon review of the In Basket request we were able to locate, review, and update the patient chart as requested for Diabetic Eye Exam     Any additional questions or concerns should be emailed to the Practice Liaisons via [email or web address] email, please do not reply via In Basket      Thank you  Dafne Morgan

## 2021-01-27 ENCOUNTER — TELEPHONE (OUTPATIENT)
Dept: SLEEP CENTER | Facility: CLINIC | Age: 76
End: 2021-01-27

## 2021-01-27 NOTE — TELEPHONE ENCOUNTER
Patient called, states DR Sammy Peraza advised him to call if he needed a medication change  States he ordered lorazepam, but patient has only been taking it during the day for anxiety  Asking if Dr Sammy Peraza could order something to help him sleep  I advised that Dr Sammy Peraza did order lorazepam 0 5 mg, 2 tablets at  for sleep  Patient states he only takes it once in a while  I advised he should take the lorazepam as prescribed for about 1 week, if it is not helpful, to call back  Patient agreeable

## 2021-03-12 DIAGNOSIS — G47.00 INSOMNIA DISORDER, WITH OTHER SLEEP DISORDER, RECURRENT: ICD-10-CM

## 2021-03-12 DIAGNOSIS — G47.8 INSOMNIA DISORDER, WITH OTHER SLEEP DISORDER, RECURRENT: ICD-10-CM

## 2021-03-14 RX ORDER — LORAZEPAM 0.5 MG/1
TABLET ORAL
Qty: 90 TABLET | Refills: 1 | Status: SHIPPED | OUTPATIENT
Start: 2021-03-14 | End: 2021-04-14

## 2021-04-14 ENCOUNTER — OFFICE VISIT (OUTPATIENT)
Dept: SLEEP CENTER | Facility: CLINIC | Age: 76
End: 2021-04-14
Payer: COMMERCIAL

## 2021-04-14 VITALS
BODY MASS INDEX: 22.13 KG/M2 | SYSTOLIC BLOOD PRESSURE: 118 MMHG | HEIGHT: 64 IN | DIASTOLIC BLOOD PRESSURE: 76 MMHG | WEIGHT: 129.6 LBS

## 2021-04-14 DIAGNOSIS — F41.9 ANXIETY: ICD-10-CM

## 2021-04-14 DIAGNOSIS — Z72.821 INADEQUATE SLEEP HYGIENE: ICD-10-CM

## 2021-04-14 DIAGNOSIS — G47.00 INSOMNIA, UNSPECIFIED TYPE: Primary | ICD-10-CM

## 2021-04-14 PROCEDURE — 99214 OFFICE O/P EST MOD 30 MIN: CPT | Performed by: INTERNAL MEDICINE

## 2021-04-14 PROCEDURE — 1036F TOBACCO NON-USER: CPT | Performed by: INTERNAL MEDICINE

## 2021-04-14 PROCEDURE — 1160F RVW MEDS BY RX/DR IN RCRD: CPT | Performed by: INTERNAL MEDICINE

## 2021-04-14 PROCEDURE — 3008F BODY MASS INDEX DOCD: CPT | Performed by: INTERNAL MEDICINE

## 2021-04-14 RX ORDER — TRAZODONE HYDROCHLORIDE 50 MG/1
TABLET ORAL
COMMUNITY
Start: 2021-04-02 | End: 2021-04-14

## 2021-04-14 RX ORDER — TRIAMCINOLONE ACETONIDE 1 MG/G
CREAM TOPICAL
COMMUNITY
Start: 2021-01-13

## 2021-04-14 RX ORDER — FLUTICASONE PROPIONATE 50 MCG
SPRAY, SUSPENSION (ML) NASAL
COMMUNITY
Start: 2021-01-28

## 2021-04-14 RX ORDER — MIRTAZAPINE 7.5 MG/1
7.5 TABLET, FILM COATED ORAL
Qty: 30 TABLET | Refills: 1 | Status: SHIPPED | OUTPATIENT
Start: 2021-04-14

## 2021-04-14 RX ORDER — SILDENAFIL 50 MG/1
TABLET, FILM COATED ORAL
COMMUNITY
Start: 2021-04-07

## 2021-04-14 NOTE — ASSESSMENT & PLAN NOTE
I would like to wean the patient off Ativan, in view of his age and history of parkinsonism, chronic benzodiazepine therapy exhibits significant risk for falling and cognitive disorders    I would like to wean it off gradually I told him to take half a tablet for the next few days and then stop it completely, alternated with mirtazapine for insomnia

## 2021-04-14 NOTE — PROGRESS NOTES
Review of Systems      Genitourinary none   Cardiology none   Gastrointestinal none   Neurology frequent headaches   Constitutional none   Integumentary none   Psychiatry anxiety   Musculoskeletal none   Pulmonary none   ENT none   Endocrine none   Hematological none

## 2021-04-14 NOTE — PATIENT INSTRUCTIONS
Take half a tablet of lorazepam 0 25 mg every night for the next for nights and then stop it completely    Start taking mirtazapine 7 5 mg HS from tonight    Mirtazapine (By mouth)   Mirtazapine ([id number])  Treats depression  Brand Name(s): Remeron, Remeron Soltab   There may be other brand names for this medicine  When This Medicine Should Not Be Used: This medicine is not right for everyone  Do not use it if you had an allergic reaction to mirtazapine  How to Use This Medicine:   Tablet, Dissolving Tablet  · Take your medicine as directed  Your dose may need to be changed several times to find what works best for you  Your doctor may tell you to take this medicine at bedtime, because it can make you sleepy  · You may need to take this medicine for several weeks before you begin to feel better  · Make sure your hands are dry before you handle the disintegrating tablet  Peel back the foil from the blister pack, then remove the tablet  Do not push the tablet through the foil  Place the tablet in your mouth  After it has melted, swallow or take a drink of water  Do not crush, split, or break the tablet  · This medicine should come with a Medication Guide  Ask your pharmacist for a copy if you do not have one  · Missed dose: Take a dose as soon as you remember  If it is almost time for your next dose, wait until then and take a regular dose  Do not take extra medicine to make up for a missed dose  · Store the medicine in a closed container at room temperature, away from heat, moisture, and direct light  Keep the orally disintegrating tablet in the original package until you are ready to take it  Drugs and Foods to Avoid:   Ask your doctor or pharmacist before using any other medicine, including over-the-counter medicines, vitamins, and herbal products  · Do not use this medicine and an MAO inhibitor within 14 days of each other  · Some medicines can affect how mirtazapine works   Tell your doctor if you are using any of the following:  ? Buspirone, carbamazepine, cimetidine, diazepam, fentanyl, lithium, nefazodone, phenytoin, rifampicin, Shelbie's wort, tramadol, or tryptophan  ? Other medicine to treat depression, a triptan medicine to treat migraine headaches, medicine to treat an infection, medicine to treat HIV, or a blood thinner (such as warfarin)  · Do not drink alcohol while you are using this medicine  Warnings While Using This Medicine:   · Tell your doctor if you are pregnant or breastfeeding, or if you have kidney disease, liver disease, glaucoma, high cholesterol, heart or blood vessel disease, or a history of seizures, heart attack, or stroke  Tell your doctor if you have phenylketonuria  · For some children, teenagers, and young adults, this medicine may increase mental or emotional problems  This may lead to thoughts of suicide and violence  Talk with your doctor right away if you have any thoughts or behavior changes that concern you  Tell your doctor if you or anyone in your family has a history of bipolar disorder or suicide attempts  · This medicine may cause the following problems:  ? Serotonin syndrome (may be life-threatening)  ? Decreased white blood cells, which can affect your body's ability to fight an infection  ? Low sodium levels in the blood  · Do not stop using this medicine suddenly  Your doctor will need to slowly decrease your dose before you stop it completely  · This medicine may make you dizzy or drowsy  Do not drive or do anything else that could be dangerous until you know how this medicine affects you  Stand or sit up slowly if you feel lightheaded or dizzy  · Your doctor will do lab tests at regular visits to check on the effects of this medicine  Keep all appointments  · Keep all medicine out of the reach of children  Never share your medicine with anyone    Possible Side Effects While Using This Medicine:   Call your doctor right away if you notice any of these side effects:  · Allergic reaction: Itching or hives, swelling in your face or hands, swelling or tingling in your mouth or throat, chest tightness, trouble breathing  · Anxiety, restlessness, fast heartbeat, fever, sweating, muscle spasms, nausea, vomiting, diarrhea, seeing or hearing things that are not there  · Blistering, peeling, red skin rash  · Eye pain, vision changes, seeing halos around lights  · Confusion, weakness, muscle twitching  · Feeling more excited or energetic than usual  · Fever, chills, cough, sore throat, body aches  · Thoughts of hurting yourself or others, worsening depression, unusual behaviors  If you notice these less serious side effects, talk with your doctor:   · Dry mouth, constipation  · Increased appetite or weight gain  · Sleepiness, tiredness  If you notice other side effects that you think are caused by this medicine, tell your doctor  Call your doctor for medical advice about side effects  You may report side effects to FDA at 6-698-XTJ-1278  © Copyright [facility] [address] Information is for End User's use only and may not be sold, redistributed or otherwise used for commercial purposes  The above information is an  only  It is not intended as medical advice for individual conditions or treatments  Talk to your doctor, nurse or pharmacist before following any medical regimen to see if it is safe and effective for you  Insomnia   WHAT YOU NEED TO KNOW:   Insomnia is a condition that makes it hard to fall or stay asleep  Lack of sleep can lead to attention or memory problems during the day  You may also be nova, depressed, clumsy, or have headaches  DISCHARGE INSTRUCTIONS:   Contact your healthcare provider if:   · Your symptoms do not get better, or they get worse  · You begin to use drugs or alcohol to fall asleep  · You have questions or concerns about your condition or care      Medicines:   · Medicines  may help you sleep more regularly or help you feel less anxious  · Take your medicine as directed  Contact your healthcare provider if you think your medicine is not helping or if you have side effects  Tell him or her if you are allergic to any medicine  Keep a list of the medicines, vitamins, and herbs you take  Include the amounts, and when and why you take them  Bring the list or the pill bottles to follow-up visits  Carry your medicine list with you in case of an emergency  What you can do to improve your sleep:   · Create a sleep schedule  Try to go to sleep and wake up at the same times every day  Keep a record of your sleep patterns, and any sleeping problems you have  Bring the record to follow-up visits with healthcare providers  · Do not take naps  Naps could make it hard for you to fall asleep at bedtime  · Keep your bedroom cool, quiet, and dark  Turn on white noise, such as a fan, to help you relax  Do not use your bed for any activity that will keep you awake  Do not read, exercise, eat, or watch TV in your bedroom  · Get up if you do not fall asleep within 20 minutes  Move to another room and do something relaxing until you become sleepy  · Limit caffeine, alcohol, and food to earlier in the day  Only drink caffeine in the morning  Do not drink alcohol within 6 hours of bedtime  Do not eat a heavy meal right before you go to bed  · Exercise regularly  Daily exercise may help you sleep better  Do not exercise within 4 hours of bedtime  Follow up with your healthcare provider as directed: Your healthcare provider may refer you for cognitive behavioral therapy  A behavioral therapist may help you find ways to relax, decrease stress, and improve sleep  Write down your questions so you remember to ask them during your visits  © Copyright [address] Information is for End User's use only and may not be sold, redistributed or otherwise used for commercial purposes   All illustrations and images included in [employer] are the copyrighted property of [employer]  or [facility] [employer]ergara   The above information is an  only  It is not intended as medical advice for individual conditions or treatments  Talk to your doctor, nurse or pharmacist before following any medical regimen to see if it is safe and effective for you

## 2021-04-14 NOTE — PROGRESS NOTES
Pulmonary/Sleep Follow Up Note   Kwasi Puga 77 y o  male MRN: [id number]  4/14/2021      Assessment and Plan:    1  Insomnia, unspecified type  Assessment & Plan:  · Patient had tried amitriptyline, trazodone, and Ambien in the past he has not been tolerating them, he has been seen by psychologist in the past and Ativan was stopped due to age and risk for sleep depression however he is still taking Ativan now  · I counseled him extensively for the importance of stopping Ativan in the risk of falling and cognitive problems with chronic benzodiazepine therapy specially with his risk of age and chronic other neurological disorders such as Parkinson's disease  · I would like to stop the Ativan completely  · I would like to start the patient on Remeron small dose of 7 5 milligram q h s  Orders:  -     mirtazapine (REMERON) 7 5 MG tablet; Take 1 tablet (7 5 mg total) by mouth daily at bedtime    2  Anxiety  Assessment & Plan:  I would like to wean the patient off Ativan, in view of his age and history of parkinsonism, chronic benzodiazepine therapy exhibits significant risk for falling and cognitive disorders  I would like to wean it off gradually I told him to take half a tablet for the next few days and then stop it completely, alternated with mirtazapine for insomnia    Orders:  -     mirtazapine (REMERON) 7 5 MG tablet; Take 1 tablet (7 5 mg total) by mouth daily at bedtime    3  Inadequate sleep hygiene  Assessment & Plan:  Counseling for sleep hygiene          Return in about 6 months (around 10/14/2021)      History of Present Illness   HPI:  Kwasi Puga is a 77 y o  male who here for follow-up visit patient has chronic history Parkinson's disease and chronic insomnia he also had a sleep study many years ago according to him unfortunately we do not have records for a but he tells me that he was completely normal   Hip patient has history of anxiety and insomnia for which he has been taking Ativan 0 5 milligram as needed during the day and every night to help fall asleep it has been recommended to stop the Ativan by psychologist before due to age and risk of falling, however the patient was seen by Dr  based on many months ago we started the medication  He tells me that he goes to bed late and a evening and he takes him about 1 hour to fall asleep and he wakes up after average 4 5 hours of sleep  His overall dissatisfied with his sleep and he has frequent awakenings his Dakota scale is at 9/24  I offered the patient other home sleep study to rule out underlying sleep disorder breathing however he declined  Review of Systems   All other systems reviewed and are negative        Historical Information   Past Medical History:   Diagnosis Date    Anxiety     Arthritis     BPH with obstruction/lower urinary tract symptoms     Diabetes mellitus ([facility] )     Hypertension     Renal calculi     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[facility] ) 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    Ureter, calculus     Urinary stone      Past Surgical History:   Procedure Laterality Date    CARDIAC SURGERY      CHOLECYSTECTOMY      CYSTOSCOPY W/ URETEROSCOPY       Family History   Problem Relation Age of Onset    Cancer Mother     Arthritis Father     Heart disease Father         cardiac disorder    Cancer Family     Arthritis Family          Meds/Allergies     Current Outpatient Medications:     carbidopa-levodopa (SINEMET CR)  mg TBCR per ER tablet, Take 1 tablet by mouth 3 (three) times a day, Disp: , Rfl:     CHOLECALCIFEROL PO, Take by mouth, Disp: , Rfl:     CYANOCOBALAMIN PO, Take by mouth, Disp: , Rfl:     fluticasone (FLONASE) 50 mcg/act nasal spray, INSTILL 1 SPRAY IN NOSTRIL(S) TWICE A DAY FOR NASAL ALLERGIES IN EACH NOSTRIL, Disp: , Rfl:     glucose blood (FREESTYLE TEST STRIPS) test strip, Test once daily and pRN Dx 250 00, Disp: , Rfl:     lisinopril (ZESTRIL) 2 5 mg tablet, Take 5 mg by mouth , Disp: , Rfl:   melatonin 3 mg, Take 4 5 mg by mouth, Disp: , Rfl:     metFORMIN (GLUCOPHAGE) 1000 MG tablet, Take 1,000 mg by mouth, Disp: , Rfl:     metoprolol succinate (TOPROL-XL) 50 mg 24 hr tablet, Take 50 mg by mouth, Disp: , Rfl:     Multiple Vitamin (MULTIVITAMINS PO), TAKE ONE CAP/TAB BY MOUTH EVERY MORNING FOR SUPPLEMENTATION, Disp: , Rfl:     sildenafil (VIAGRA) 50 MG tablet, TAKE ONE-HALF TABLET BY MOUTH AS DIRECTED PRIOR TO SEXUAL ACTIVITY FOR ERECTILE DYSFUNCTION, Disp: , Rfl:     simvastatin (ZOCOR) 80 mg tablet, Take 80 mg by mouth, Disp: , Rfl:     triamcinolone (KENALOG) 0 1 % cream, APPLY SMALL AMOUNT TOPICALLY TWICE A DAY FOR SKIN CONDITION, Disp: , Rfl:     fluticasone (FLONASE) 50 mcg/act nasal spray, 1 spray into each nostril daily, Disp: , Rfl:     mirtazapine (REMERON) 7 5 MG tablet, Take 1 tablet (7 5 mg total) by mouth daily at bedtime, Disp: 30 tablet, Rfl: 1    Multiple Vitamins-Minerals (PRESERVISION AREDS PO), Take by mouth, Disp: , Rfl:     multivitamin (THERAGRAN) TABS, Take 1 tablet by mouth, Disp: , Rfl:   Allergies   Allergen Reactions    Ketotifen Diarrhea and Other (See Comments)       Vitals: Blood pressure 118/76, height 5' 4" (1 626 m), weight 58 8 kg (129 lb 9 6 oz)  Body mass index is 22 25 kg/m²  Physical Exam  Physical Exam  Constitutional:       Appearance: Normal appearance  He is not ill-appearing or diaphoretic  HENT:      Head: Normocephalic and atraumatic  Nose: No congestion or rhinorrhea  Mouth/Throat:      Mouth: Mucous membranes are moist       Pharynx: Oropharynx is clear  No oropharyngeal exudate or posterior oropharyngeal erythema  Eyes:      General: No scleral icterus  Right eye: No discharge  Left eye: No discharge  Extraocular Movements: Extraocular movements intact  Conjunctiva/sclera: Conjunctivae normal    Neck:      Musculoskeletal: Normal range of motion and neck supple  No neck rigidity  Cardiovascular:      Rate and Rhythm: Normal rate and regular rhythm  Pulses: Normal pulses  Heart sounds: Normal heart sounds  No murmur  No gallop  Pulmonary:      Effort: Pulmonary effort is normal  No respiratory distress  Breath sounds: Normal breath sounds  No wheezing, rhonchi or rales  Abdominal:      General: Abdomen is flat  Bowel sounds are normal  There is no distension  Palpations: Abdomen is soft  Tenderness: There is no abdominal tenderness  Musculoskeletal:         General: No swelling, tenderness or deformity  Skin:     General: Skin is warm and dry  Neurological:      General: No focal deficit present  Mental Status: He is alert and oriented to person, place, and time  Mental status is at baseline  Psychiatric:         Mood and Affect: Mood normal          Behavior: Behavior normal          Thought Content: Thought content normal          Judgment: Judgment normal          Labs: I have personally reviewed pertinent lab results  , ABG: No results found for: PHART, INK7KQH, PO2ART, SFQ8ECF, K6JUGLML, BEART, SOURCE, BNP: No results found for: BNP, CBC: No results found for: WBC, HGB, HCT, MCV, PLT, ADJUSTEDWBC, MCH, MCHC, RDW, MPV, NRBC, CMP: No results found for: SODIUM, K, CL, CO2, ANIONGAP, BUN, CREATININE, GLUCOSE, CALCIUM, AST, ALT, ALKPHOS, PROT, BILITOT, EGFR, PT/INR: No results found for: PT, INR, Troponin: No results found for: TROPONINI  No results found for: WBC, HGB, HCT, MCV, PLT  No results found for: GLUCOSE, CALCIUM, NA, K, CO2, CL, BUN, CREATININE  No results found for: IGE  No results found for: ALT, AST, GGT, ALKPHOS, BILITOT     Ref Range & Units 5/13/20 2:05 PM   Vit D, 25-Hydroxy 30 0 - 100 0 ng/mL 41 4          Ref Range & Units 5/13/20 2:05 PM   TSH 3RD GENERATON 0 358 - 3 740 uIU/mL 1 090      Glucose 65 - 99 mg/dL 124High     BUN 7 - 28 mg/dL 26    Creatinine 0 53 - 1 30 mg/dL 1 26    Sodium 135 - 145 mmol/L 135    Potassium 3 5 - 5 2 mmol/L 3 5    Chloride 100 - 109 mmol/L 103    Carbon Dioxide 23 - 31 mmol/L 25    Calcium 8 5 - 10 1 mg/dL 8 7    Alkaline Phosphatase 35 - 120 U/L 77    Albumin 3 5 - 4 8 g/dL 3 2Low     Bilirubin, Total 0 2 - 1 0 mg/dL 0 7    Protein, Total 6 3 - 8 3 g/dL 7 4    AST <41 U/L 12    ALT <56 U/L 20    Anion Gap 3 - 11  7    GFR, Calculated >60 mL/min/1 73m2 57Low     Comment: mL/min per 1 73 square meters       Ref Range & Units 10/30/18  2:38 PM   Hemoglobin 12 5 - 17 0 g/dL 13 6    Hematocrit 37 0 - 48 0 % 39 9    WBC 4 0 - 10 5 thou/cmm 21 4High     RBC 4 00 - 5 40 mill/cmm 4 46    Platelet Count 368 - 350 thou/cmm 233    MPV 7 5 - 11 3 fL 7 7    MCV 80 - 100 fL 90    MCH 27 - 36 pg 30 6    MCHC 32 - 37 g/dL 34 2    RDW 12 0 - 16 0 % 13 6        Jolanta Dolan MD  [facility]       Portions of the record may have been created with voice recognition software  Occasional wrong word or "sound a like" substitutions may have occurred due to the inherent limitations of voice recognition software  Read the chart carefully and recognize, using context, where substitutions have occurred

## 2021-04-14 NOTE — ASSESSMENT & PLAN NOTE
· Patient had tried amitriptyline, trazodone, and Ambien in the past he has not been tolerating them, he has been seen by psychologist in the past and Ativan was stopped due to age and risk for sleep depression however he is still taking Ativan now  · I counseled him extensively for the importance of stopping Ativan in the risk of falling and cognitive problems with chronic benzodiazepine therapy specially with his risk of age and chronic other neurological disorders such as Parkinson's disease  · I would like to stop the Ativan completely  · I would like to start the patient on Remeron small dose of 7 5 milligram q h s

## 2021-04-16 ENCOUNTER — TELEPHONE (OUTPATIENT)
Dept: SLEEP CENTER | Facility: CLINIC | Age: 76
End: 2021-04-16

## 2021-05-12 ENCOUNTER — TELEPHONE (OUTPATIENT)
Dept: SLEEP CENTER | Facility: CLINIC | Age: 76
End: 2021-05-12

## 2021-05-12 NOTE — TELEPHONE ENCOUNTER
Returned patient's voice message  He states he is having medication issues  States Dr Teto Dudley stopped his Ativan and put him on Remeron  He tried the Remeron for 3 nights and cannot use it  States he got very broken up sleep and weird dreams  He was then tired the following day  He is wondering if Dr Teto Dudley can put him back on the Ativan  Advised patient that per office visit note from Dr Teto Dudley on 4/14/21, Dr Teto Dudley no longer feels Ativan is appropriate due to age and risk of falling  I advised patient I will send his request to Dr Teto Dudley and call him back with recommendations  Dr Teto Dudley, please advise

## 2021-05-24 ENCOUNTER — VBI (OUTPATIENT)
Dept: ADMINISTRATIVE | Facility: OTHER | Age: 76
End: 2021-05-24

## 2021-05-28 ENCOUNTER — TELEPHONE (OUTPATIENT)
Dept: UROLOGY | Facility: AMBULATORY SURGERY CENTER | Age: 76
End: 2021-05-28

## 2021-05-28 NOTE — TELEPHONE ENCOUNTER
Patient managed by Narendra Foster is calling to say his PSA was 5 0 and he goes to [facility]  [facility] or with [facility]rendra Foster  He needs to know if he needs to get a kub  He said he gets checked every two years   He will have [facility] fax to [phone]

## 2021-06-03 ENCOUNTER — TELEPHONE (OUTPATIENT)
Dept: UROLOGY | Facility: MEDICAL CENTER | Age: 76
End: 2021-06-03

## 2021-06-03 NOTE — TELEPHONE ENCOUNTER
Spoke to pt and advised per Dr Narendra Foster that pt does not need KUB due to CT scan in Aug 2020 showing no stones  Pt stated recent PSA at [place] was 5 0  Pt last seen by Dr Narendra Foster on 7/24/20  OV note states:    "At age 77, I am not concerned about his PSA of 4 9  Follow-up two years"      Pt stated he would like to follow up with Dr Narendra Foster not [place] in future  Advised pt he would be receiving a card in the mail to schedule his next FU in July 2022  Confirmed pt's mailing address

## 2021-06-11 ENCOUNTER — VBI (OUTPATIENT)
Dept: ADMINISTRATIVE | Facility: OTHER | Age: 76
End: 2021-06-11

## 2021-08-13 ENCOUNTER — VBI (OUTPATIENT)
Dept: ADMINISTRATIVE | Facility: OTHER | Age: 76
End: 2021-08-13

## 2021-09-17 ENCOUNTER — VBI (OUTPATIENT)
Dept: ADMINISTRATIVE | Facility: OTHER | Age: 76
End: 2021-09-17

## 2021-09-28 ENCOUNTER — TELEPHONE (OUTPATIENT)
Dept: FAMILY MEDICINE CLINIC | Facility: CLINIC | Age: 76
End: 2021-09-28

## 2021-09-28 NOTE — TELEPHONE ENCOUNTER
Left message for patient to call back to help him set up an appt with Dr Gio Hagen he is due for medicare wellness

## 2021-10-01 NOTE — TELEPHONE ENCOUNTER
Patient called back and he did not want to make appt   I did advise him that a form came in to filled for his blood glucose reagent strips and he needed an appt and he did not want to make appt

## 2021-10-06 ENCOUNTER — VBI (OUTPATIENT)
Dept: ADMINISTRATIVE | Facility: OTHER | Age: 76
End: 2021-10-06

## 2021-10-08 ENCOUNTER — APPOINTMENT (OUTPATIENT)
Dept: RADIOLOGY | Facility: MEDICAL CENTER | Age: 76
End: 2021-10-08
Payer: COMMERCIAL

## 2021-10-08 ENCOUNTER — OFFICE VISIT (OUTPATIENT)
Dept: OBGYN CLINIC | Facility: MEDICAL CENTER | Age: 76
End: 2021-10-08
Payer: COMMERCIAL

## 2021-10-08 VITALS
BODY MASS INDEX: 22.47 KG/M2 | HEIGHT: 64 IN | HEART RATE: 77 BPM | SYSTOLIC BLOOD PRESSURE: 114 MMHG | DIASTOLIC BLOOD PRESSURE: 78 MMHG | WEIGHT: 131.6 LBS

## 2021-10-08 DIAGNOSIS — M25.551 PAIN IN RIGHT HIP: Primary | ICD-10-CM

## 2021-10-08 DIAGNOSIS — M16.11 PRIMARY OSTEOARTHRITIS OF ONE HIP, RIGHT: ICD-10-CM

## 2021-10-08 DIAGNOSIS — M25.551 PAIN IN RIGHT HIP: ICD-10-CM

## 2021-10-08 PROCEDURE — 1036F TOBACCO NON-USER: CPT | Performed by: ORTHOPAEDIC SURGERY

## 2021-10-08 PROCEDURE — 73502 X-RAY EXAM HIP UNI 2-3 VIEWS: CPT

## 2021-10-08 PROCEDURE — 99213 OFFICE O/P EST LOW 20 MIN: CPT | Performed by: ORTHOPAEDIC SURGERY

## 2021-10-08 PROCEDURE — 3008F BODY MASS INDEX DOCD: CPT | Performed by: ORTHOPAEDIC SURGERY

## 2021-10-08 PROCEDURE — 1160F RVW MEDS BY RX/DR IN RCRD: CPT | Performed by: ORTHOPAEDIC SURGERY

## 2021-10-11 ENCOUNTER — TELEPHONE (OUTPATIENT)
Dept: OBGYN CLINIC | Facility: HOSPITAL | Age: 76
End: 2021-10-11

## 2021-10-12 ENCOUNTER — VBI (OUTPATIENT)
Dept: ADMINISTRATIVE | Facility: OTHER | Age: 76
End: 2021-10-12

## 2021-11-19 ENCOUNTER — HOSPITAL ENCOUNTER (OUTPATIENT)
Dept: RADIOLOGY | Facility: MEDICAL CENTER | Age: 76
Discharge: HOME | End: 2021-11-19
Attending: PHYSICAL MEDICINE & REHABILITATION | Admitting: PHYSICAL MEDICINE & REHABILITATION
Payer: COMMERCIAL

## 2021-11-19 VITALS
RESPIRATION RATE: 18 BRPM | SYSTOLIC BLOOD PRESSURE: 139 MMHG | DIASTOLIC BLOOD PRESSURE: 84 MMHG | TEMPERATURE: 97.2 F | OXYGEN SATURATION: 97 % | HEART RATE: 75 BPM

## 2021-11-19 PROCEDURE — 20610 DRAIN/INJ JOINT/BURSA W/O US: CPT | Performed by: PHYSICAL MEDICINE & REHABILITATION

## 2021-11-19 PROCEDURE — 77002 NEEDLE LOCALIZATION BY XRAY: CPT

## 2021-11-19 PROCEDURE — 77002 NEEDLE LOCALIZATION BY XRAY: CPT | Performed by: PHYSICAL MEDICINE & REHABILITATION

## 2021-11-19 RX ORDER — BUPIVACAINE HCL/PF 2.5 MG/ML
3 VIAL (ML) INJECTION ONCE
Status: COMPLETED | OUTPATIENT
Start: 2021-11-19 | End: 2021-11-19

## 2021-11-19 RX ORDER — METHYLPREDNISOLONE ACETATE 40 MG/ML
40 INJECTION, SUSPENSION INTRA-ARTICULAR; INTRALESIONAL; INTRAMUSCULAR; PARENTERAL; SOFT TISSUE ONCE
Status: COMPLETED | OUTPATIENT
Start: 2021-11-19 | End: 2021-11-19

## 2021-11-19 RX ADMIN — METHYLPREDNISOLONE ACETATE 40 MG: 40 INJECTION, SUSPENSION INTRA-ARTICULAR; INTRALESIONAL; INTRAMUSCULAR; PARENTERAL; SOFT TISSUE at 11:37

## 2021-11-19 RX ADMIN — IOHEXOL 2 ML: 300 INJECTION, SOLUTION INTRAVENOUS at 11:36

## 2021-11-19 RX ADMIN — Medication 3 ML: at 11:37

## 2021-11-29 ENCOUNTER — TELEPHONE (OUTPATIENT)
Dept: PAIN MEDICINE | Facility: CLINIC | Age: 76
End: 2021-11-29

## 2021-12-07 ENCOUNTER — TELEPHONE (OUTPATIENT)
Dept: PAIN MEDICINE | Facility: MEDICAL CENTER | Age: 76
End: 2021-12-07

## 2021-12-09 ENCOUNTER — VBI (OUTPATIENT)
Dept: ADMINISTRATIVE | Facility: OTHER | Age: 76
End: 2021-12-09

## 2021-12-14 ENCOUNTER — VBI (OUTPATIENT)
Dept: ADMINISTRATIVE | Facility: OTHER | Age: 76
End: 2021-12-14

## 2022-01-04 ENCOUNTER — TELEPHONE (OUTPATIENT)
Dept: OBGYN CLINIC | Facility: HOSPITAL | Age: 77
End: 2022-01-04

## 2022-01-04 NOTE — TELEPHONE ENCOUNTER
Information above provided to patient and he verbalized understanding  Appt scheduled for R hip pain 2/7 with Dr Elvira Smith

## 2022-01-04 NOTE — TELEPHONE ENCOUNTER
Teresita Smith, it looks like patient reported 80% pain relief when he spoke with Pain Management after the right hip injection  As far as next steps, this injection can be repeated every 3 months  Dr Mc Ribeiro had mentioned looking into his low back at next visit if injection did not provide sufficient relief  That is something he would have to come into the office for  Otherwise he can continue the tylenol up to 3000 mg per day  He may want to take it easy and just do some gentle stretching for now if he aggravated it heavy lifting  Would recommend avoiding provoking movements and activities if possible  We are happy to see him in the office for recheck  Thanks!

## 2022-01-04 NOTE — TELEPHONE ENCOUNTER
Patient sees Dr Perla Sims  Patient is calling because he is still having pain in his hip  He stated the injection didn't really help but he thinks he might have aggravate it when it went to the grocery store and picked up something heavy  He stated the cream that he uses does help but would like to know what his next steps are? He didn't want to make an appointment until he had advice          : [phone]

## 2022-01-04 NOTE — TELEPHONE ENCOUNTER
Limited help from injection but may have aggravated due to heavy lifting  Voltaren gel helps along with tylenol  Please advise

## 2022-01-24 ENCOUNTER — OFFICE VISIT (OUTPATIENT)
Dept: URGENT CARE | Facility: MEDICAL CENTER | Age: 77
End: 2022-01-24

## 2022-01-24 ENCOUNTER — TELEPHONE (OUTPATIENT)
Dept: FAMILY MEDICINE CLINIC | Facility: CLINIC | Age: 77
End: 2022-01-24

## 2022-01-24 VITALS
RESPIRATION RATE: 18 BRPM | WEIGHT: 131 LBS | SYSTOLIC BLOOD PRESSURE: 136 MMHG | HEART RATE: 82 BPM | DIASTOLIC BLOOD PRESSURE: 80 MMHG | OXYGEN SATURATION: 97 % | BODY MASS INDEX: 22.49 KG/M2 | TEMPERATURE: 98.2 F

## 2022-01-24 DIAGNOSIS — J06.9 ACUTE URI: Primary | ICD-10-CM

## 2022-01-24 PROCEDURE — 99213 OFFICE O/P EST LOW 20 MIN: CPT | Performed by: FAMILY MEDICINE

## 2022-01-24 RX ORDER — BENZONATATE 100 MG/1
100 CAPSULE ORAL 3 TIMES DAILY PRN
Qty: 20 CAPSULE | Refills: 0 | Status: SHIPPED | OUTPATIENT
Start: 2022-01-24

## 2022-01-24 RX ORDER — QUETIAPINE FUMARATE 50 MG/1
0.5 TABLET, FILM COATED ORAL
COMMUNITY
Start: 2021-12-09

## 2022-01-24 RX ORDER — UBIDECARENONE 100 MG
100 CAPSULE ORAL DAILY
COMMUNITY

## 2022-01-24 RX ORDER — FLUTICASONE PROPIONATE 50 MCG
2 SPRAY, SUSPENSION (ML) NASAL DAILY
Qty: 16 G | Refills: 0 | Status: SHIPPED | OUTPATIENT
Start: 2022-01-24

## 2022-01-24 NOTE — PROGRESS NOTES
[facility]        NAME: Emmanuel Alvarado is a 77 y o  male  : 1945    MRN: [id number]  DATE: 2022  TIME: 3:06 PM    Assessment and Plan   Acute URI [J06 9]  1  Acute URI  fluticasone (FLONASE) 50 mcg/act nasal spray    benzonatate (TESSALON PERLES) 100 mg capsule         Patient Instructions       Follow up with PCP in 3-5 days  Proceed to  ER if symptoms worsen  Chief Complaint     Chief Complaint   Patient presents with    Sore Throat     Patient c/o runny nose and cough x 5 days          History of Present Illness       78-year-old male here today with URI symptoms for the last 5 days  He is complaining of nasal congestion which has improved slightly  Denies fever  Denies postnasal drip  He is also complaining of nonproductive cough not accompanied by any shortness of breath  Cough seems to be sporadic  Denies sore throat  Denies loss of smell, loss of taste  Denies nausea, vomiting, diarrhea  Denies any known contact with anyone testing positive for COVID-19  He is currently immunized against COVID and has received a flu vaccine this year  He he called his PCP however, PCP office was unable to cm in decided come to urgent care for further assessment  Review of Systems   Review of Systems   Constitutional: Negative  HENT: Positive for congestion  Respiratory: Positive for cough  Gastrointestinal: Negative  Musculoskeletal: Negative  Neurological: Negative  Current Medications       Current Outpatient Medications:     Diclofenac Sodium (VOLTAREN) 1 %, APPLY 4GM TO LOWER EXTREMITIES TOPICALLY TWICE A DAY **USE DOSING CARD** (DO NOT EXCEED 16 GRAMS DAILY TO ANY AFFECTED JOINT OF THE LOWER EXTREMITIES  DO NOT EXCEED 8 GRAMS DAILY TO ANY AFFECTED JOINT OF THE UPPER EXTREMITIES   DO NOT EXCEED A TOTAL DOSE OF 32 GRAMS DAILY OVER ALL JOINTS), Disp: , Rfl:     QUEtiapine (SEROquel) 50 mg tablet, Take 0 5 tablets by mouth daily at bedtime, Disp: , Rfl:     benzonatate (TESSALON PERLES) 100 mg capsule, Take 1 capsule (100 mg total) by mouth 3 (three) times a day as needed for cough, Disp: 20 capsule, Rfl: 0    carbidopa-levodopa (SINEMET CR)  mg TBCR per ER tablet, Take 1 tablet by mouth 3 (three) times a day, Disp: , Rfl:     CHOLECALCIFEROL PO, Take by mouth, Disp: , Rfl:     co-enzyme Q-10 100 mg capsule, Take 100 mg by mouth daily, Disp: , Rfl:     CYANOCOBALAMIN PO, Take by mouth, Disp: , Rfl:     fluticasone (FLONASE) 50 mcg/act nasal spray, 1 spray into each nostril daily, Disp: , Rfl:     fluticasone (FLONASE) 50 mcg/act nasal spray, INSTILL 1 SPRAY IN NOSTRIL(S) TWICE A DAY FOR NASAL ALLERGIES IN EACH NOSTRIL, Disp: , Rfl:     fluticasone (FLONASE) 50 mcg/act nasal spray, 2 sprays into each nostril daily, Disp: 16 g, Rfl: 0    glucose blood (FREESTYLE TEST STRIPS) test strip, Test once daily and pRN Dx 250 00, Disp: , Rfl:     lisinopril (ZESTRIL) 2 5 mg tablet, Take 5 mg by mouth , Disp: , Rfl:     melatonin 3 mg, Take 4 5 mg by mouth, Disp: , Rfl:     metFORMIN (GLUCOPHAGE) 1000 MG tablet, Take 1,000 mg by mouth, Disp: , Rfl:     metoprolol succinate (TOPROL-XL) 50 mg 24 hr tablet, Take 50 mg by mouth, Disp: , Rfl:     mirtazapine (REMERON) 7 5 MG tablet, Take 1 tablet (7 5 mg total) by mouth daily at bedtime, Disp: 30 tablet, Rfl: 1    Multiple Vitamin (MULTIVITAMINS PO), TAKE ONE CAP/TAB BY MOUTH EVERY MORNING FOR SUPPLEMENTATION, Disp: , Rfl:     Multiple Vitamins-Minerals (PRESERVISION AREDS PO), Take by mouth, Disp: , Rfl:     multivitamin (THERAGRAN) TABS, Take 1 tablet by mouth, Disp: , Rfl:     sildenafil (VIAGRA) 50 MG tablet, TAKE ONE-HALF TABLET BY MOUTH AS DIRECTED PRIOR TO SEXUAL ACTIVITY FOR ERECTILE DYSFUNCTION (Patient not taking: Reported on 1/24/2022), Disp: , Rfl:     simvastatin (ZOCOR) 80 mg tablet, Take 80 mg by mouth, Disp: , Rfl:     triamcinolone (KENALOG) 0 1 % cream, APPLY SMALL AMOUNT TOPICALLY TWICE A DAY FOR SKIN CONDITION, Disp: , Rfl:     Current Allergies     Allergies as of 01/24/2022 - Reviewed 01/24/2022   Allergen Reaction Noted    Ketotifen Diarrhea and Other (See Comments) 01/27/2021            The following portions of the patient's history were reviewed and updated as appropriate: allergies, current medications, past family history, past medical history, past social history, past surgical history and problem list      Past Medical History:   Diagnosis Date    Anxiety     Arthritis     BPH with obstruction/lower urinary tract symptoms     [facility] ([facility] )     Hypertension     Renal calculi     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/2019    Ureter, calculus     Urinary stone        Past Surgical History:   Procedure Laterality Date    CARDIAC SURGERY      CHOLECYSTECTOMY      CYSTOSCOPY W/ URETEROSCOPY         Family History   Problem Relation Age of Onset    Cancer Mother     Arthritis Father     Heart disease Father         cardiac disorder    Cancer Family     Arthritis Family          Medications have been verified  Objective   /80   Pulse 82   Temp 98 2 °F (36 8 °C)   Resp 18   Wt 59 4 kg (131 lb)   SpO2 97%   BMI 22 49 kg/m²   No LMP for male patient  Physical Exam     Physical Exam  Vitals and nursing note reviewed  Constitutional:       Appearance: He is well-developed  HENT:      Nose:      Comments: Hypertrophic boggy left turbinates with clear to whitish nasal discharge  Mouth/Throat:      Mouth: Mucous membranes are moist    Cardiovascular:      Rate and Rhythm: Normal rate and regular rhythm  Pulmonary:      Effort: Pulmonary effort is normal       Breath sounds: Normal breath sounds  Musculoskeletal:      Cervical back: Normal range of motion and neck supple  Neurological:      Mental Status: He is alert

## 2022-01-24 NOTE — TELEPHONE ENCOUNTER
Patient called for an appointment and when I went to set the appointment up for him a red flag comes up that states that the patient need the [facility] to refer him to us in order to have us schedule him  I asked patient to call the va and ask and he said that this information is incorrect that he called them and they advised that he should not need anything  Then I called the phone number on the va card and they said that he needs to call the va and ask for outside care services and then he can call here and make this appointment

## 2022-01-24 NOTE — PATIENT INSTRUCTIONS
Patient is afebrile  Vital signs are stable  I prescribed fluticasone nasal spray-2 sprays in each nostril once daily, Tessalon Perles 100 mg q 8 hours p r n  as needed for cough  Recommend patient increase fluid intake  If symptoms worsen, accompanied by fever, he is to follow with primary care provider  He expressed understanding  Cold Symptoms, Ambulatory Care   GENERAL INFORMATION:   Cold symptoms  include sneezing, dry throat, a stuffy nose, headache, watery eyes, and a cough  Your cough may be dry, or you may cough up mucus  You may also have muscle aches, joint pain, and tiredness  Rarely, you may have a fever  Cold symptoms occur from inflammation in your upper respiratory system caused by a virus  Most colds go away without treatment  Seek immediate care for the following symptoms:   · A heartbeat that is much faster than usual for you     · A swollen neck that is sore to the touch     · Increased tiredness and weakness    · Pinpoint or larger reddish-purple dots on your skin     · Poor or no appetite  Treatment for cold symptoms  may include NSAIDS to decrease muscle aches and fever  Do not give NSAID medicines to children under 10months of age without direction from your child's doctor  Cold medicines may also be given to decrease coughing, nasal stuffiness, sneezing, and a runny nose  Do not give cold medicines to children under 11years of age without direction from your child's doctor  Manage your cold symptoms with the following:   · Drink liquids  to help thin and loosen thick mucus so you can cough it up  Liquids will also keep you hydrated  Ask your healthcare provider which liquids are best for you and how much to drink each day  · Do not smoke  because it may worsen your symptoms and increase the length of time you feel sick  Talk with your healthcare provider if you need help to stop smoking  Prevent the spread of germs  by washing your hands often   You can spread your cold germs to others for at least 3 days after your symptoms start  Do not share items, such as eating utensils  Cover your nose and mouth when you cough or sneeze using the crook of your elbow instead of your hands  Throw used tissues in the garbage  Follow up with your healthcare provider as directed:  Write down your questions so you remember to ask them during your visits  CARE AGREEMENT:   You have the right to help plan your care  Learn about your health condition and how it may be treated  Discuss treatment options with your caregivers to decide what care you want to receive  You always have the right to refuse treatment  The above information is an  only  It is not intended as medical advice for individual conditions or treatments  Talk to your doctor, nurse or pharmacist before following any medical regimen to see if it is safe and effective for you  © 2014 [address] is for End User's use only and may not be sold, redistributed or otherwise used for commercial purposes  All illustrations and images included in CareNotes® are the copyrighted property of A D [employer] M [employer]  or Dylan Solorzano

## 2022-01-31 ENCOUNTER — TELEPHONE (OUTPATIENT)
Dept: OBGYN CLINIC | Facility: HOSPITAL | Age: 77
End: 2022-01-31

## 2022-02-11 ENCOUNTER — TELEPHONE (OUTPATIENT)
Dept: PAIN MEDICINE | Facility: MEDICAL CENTER | Age: 77
End: 2022-02-11

## 2022-02-11 NOTE — TELEPHONE ENCOUNTER
Patient is requesting a repeat injection  He's experiencing right hip pain again   Please reach out to him at # [phone]

## 2022-02-23 ENCOUNTER — VBI (OUTPATIENT)
Dept: ADMINISTRATIVE | Facility: OTHER | Age: 77
End: 2022-02-23

## 2022-02-23 ENCOUNTER — TELEPHONE (OUTPATIENT)
Dept: OBGYN CLINIC | Facility: HOSPITAL | Age: 77
End: 2022-02-23

## 2022-02-23 DIAGNOSIS — M16.11 PRIMARY OSTEOARTHRITIS OF ONE HIP, RIGHT: Primary | ICD-10-CM

## 2022-02-23 NOTE — TELEPHONE ENCOUNTER
Patient is calling to request another right hip injection procedure with Dr Sandra Cordero  Please advise       Tova Carlson [phone] (ok to [facility])

## 2022-02-23 NOTE — TELEPHONE ENCOUNTER
Took call transferred from phone room  S/W pt  Advised pt of the same  Gave pt ortho's phone #  Pt verbalized understanding

## 2022-03-01 NOTE — TELEPHONE ENCOUNTER
This is a direct referral from Dr Stephany Elias-    Please assist pt with scheduling his hip injection   Thank you

## 2022-03-01 NOTE — TELEPHONE ENCOUNTER
Patient is requesting to speak to the nurse regarding his next hip injection  He would like to know if Dr Willie Campoverde ordered it   Please reach out to him at # [phone]

## 2022-03-01 NOTE — TELEPHONE ENCOUNTER
Called patient, he stated he was driving at the time, wanted us to call back  Will call patient tomorrow

## 2022-03-02 NOTE — TELEPHONE ENCOUNTER
Called and scheduled patient for RT HIP INJ, Reviewed instructions: , NPO 1 hour prior, loose-fitting/comfortable pants, if ill/fever/infx/abx to call and reschedule  No immunizations or vaccinations 2w prior/after steroid injections  Patient stated verbal understanding

## 2022-03-04 ENCOUNTER — APPOINTMENT (OUTPATIENT)
Dept: LAB | Facility: CLINIC | Age: 77
End: 2022-03-04
Payer: COMMERCIAL

## 2022-03-04 ENCOUNTER — LAB (OUTPATIENT)
Dept: LAB | Facility: HOSPITAL | Age: 77
End: 2022-03-04
Attending: FAMILY MEDICINE
Payer: COMMERCIAL

## 2022-03-04 ENCOUNTER — TELEMEDICINE (OUTPATIENT)
Dept: FAMILY MEDICINE CLINIC | Facility: CLINIC | Age: 77
End: 2022-03-04
Payer: COMMERCIAL

## 2022-03-04 VITALS — TEMPERATURE: 97.8 F

## 2022-03-04 DIAGNOSIS — E11.42 TYPE 2 DIABETES MELLITUS WITH DIABETIC POLYNEUROPATHY, WITHOUT LONG-TERM CURRENT USE OF INSULIN (HCC): ICD-10-CM

## 2022-03-04 DIAGNOSIS — R19.7 DIARRHEA, UNSPECIFIED TYPE: ICD-10-CM

## 2022-03-04 DIAGNOSIS — F41.9 ANXIETY: ICD-10-CM

## 2022-03-04 DIAGNOSIS — R19.7 DIARRHEA, UNSPECIFIED TYPE: Primary | ICD-10-CM

## 2022-03-04 LAB
ALBUMIN SERPL BCP-MCNC: 4 G/DL (ref 3.5–5)
ALP SERPL-CCNC: 60 U/L (ref 46–116)
ALT SERPL W P-5'-P-CCNC: 21 U/L (ref 12–78)
ANION GAP SERPL CALCULATED.3IONS-SCNC: 8 MMOL/L (ref 4–13)
AST SERPL W P-5'-P-CCNC: 15 U/L (ref 5–45)
BACTERIA UR QL AUTO: NORMAL /HPF
BASOPHILS # BLD AUTO: 0.04 THOUSANDS/ΜL (ref 0–0.1)
BASOPHILS NFR BLD AUTO: 0 % (ref 0–1)
BILIRUB SERPL-MCNC: 0.82 MG/DL (ref 0.2–1)
BILIRUB UR QL STRIP: NEGATIVE
BUN SERPL-MCNC: 24 MG/DL (ref 5–25)
CALCIUM SERPL-MCNC: 9.4 MG/DL (ref 8.3–10.1)
CHLORIDE SERPL-SCNC: 100 MMOL/L (ref 100–108)
CLARITY UR: CLEAR
CO2 SERPL-SCNC: 29 MMOL/L (ref 21–32)
COLOR UR: ABNORMAL
CREAT SERPL-MCNC: 1.19 MG/DL (ref 0.6–1.3)
CREAT UR-MCNC: 43.6 MG/DL
EOSINOPHIL # BLD AUTO: 0.08 THOUSAND/ΜL (ref 0–0.61)
EOSINOPHIL NFR BLD AUTO: 1 % (ref 0–6)
ERYTHROCYTE [DISTWIDTH] IN BLOOD BY AUTOMATED COUNT: 13.2 % (ref 11.6–15.1)
EST. AVERAGE GLUCOSE BLD GHB EST-MCNC: 120 MG/DL
GFR SERPL CREATININE-BSD FRML MDRD: 59 ML/MIN/1.73SQ M
GLUCOSE P FAST SERPL-MCNC: 122 MG/DL (ref 65–99)
GLUCOSE UR STRIP-MCNC: NEGATIVE MG/DL
HBA1C MFR BLD: 5.8 %
HCT VFR BLD AUTO: 45.5 % (ref 36.5–49.3)
HGB BLD-MCNC: 15.8 G/DL (ref 12–17)
HGB UR QL STRIP.AUTO: NEGATIVE
IMM GRANULOCYTES # BLD AUTO: 0.04 THOUSAND/UL (ref 0–0.2)
IMM GRANULOCYTES NFR BLD AUTO: 0 % (ref 0–2)
KETONES UR STRIP-MCNC: NEGATIVE MG/DL
LACTATE SERPL-SCNC: 0.9 MMOL/L (ref 0.5–2)
LEUKOCYTE ESTERASE UR QL STRIP: NEGATIVE
LIPASE SERPL-CCNC: 138 U/L (ref 73–393)
LYMPHOCYTES # BLD AUTO: 2.96 THOUSANDS/ΜL (ref 0.6–4.47)
LYMPHOCYTES NFR BLD AUTO: 27 % (ref 14–44)
MCH RBC QN AUTO: 31.5 PG (ref 26.8–34.3)
MCHC RBC AUTO-ENTMCNC: 34.7 G/DL (ref 31.4–37.4)
MCV RBC AUTO: 91 FL (ref 82–98)
MICROALBUMIN UR-MCNC: 712 MG/L (ref 0–20)
MICROALBUMIN/CREAT 24H UR: 1633 MG/G CREATININE (ref 0–30)
MONOCYTES # BLD AUTO: 0.98 THOUSAND/ΜL (ref 0.17–1.22)
MONOCYTES NFR BLD AUTO: 9 % (ref 4–12)
NEUTROPHILS # BLD AUTO: 6.79 THOUSANDS/ΜL (ref 1.85–7.62)
NEUTS SEG NFR BLD AUTO: 63 % (ref 43–75)
NITRITE UR QL STRIP: NEGATIVE
NON-SQ EPI CELLS URNS QL MICRO: NORMAL /HPF
NRBC BLD AUTO-RTO: 0 /100 WBCS
PH UR STRIP.AUTO: 6 [PH]
PLATELET # BLD AUTO: 247 THOUSANDS/UL (ref 149–390)
PMV BLD AUTO: 9.6 FL (ref 8.9–12.7)
POTASSIUM SERPL-SCNC: 4.6 MMOL/L (ref 3.5–5.3)
PROT SERPL-MCNC: 7.5 G/DL (ref 6.4–8.2)
PROT UR STRIP-MCNC: ABNORMAL MG/DL
RBC # BLD AUTO: 5.02 MILLION/UL (ref 3.88–5.62)
RBC #/AREA URNS AUTO: NORMAL /HPF
SODIUM SERPL-SCNC: 137 MMOL/L (ref 136–145)
SP GR UR STRIP.AUTO: 1.01 (ref 1–1.03)
TSH SERPL DL<=0.05 MIU/L-ACNC: 1.4 UIU/ML (ref 0.36–3.74)
UROBILINOGEN UR STRIP-ACNC: <2 MG/DL
WBC # BLD AUTO: 10.89 THOUSAND/UL (ref 4.31–10.16)
WBC #/AREA URNS AUTO: NORMAL /HPF

## 2022-03-04 PROCEDURE — 99442 PR PHYS/QHP TELEPHONE EVALUATION 11-20 MIN: CPT | Performed by: FAMILY MEDICINE

## 2022-03-04 PROCEDURE — 85025 COMPLETE CBC W/AUTO DIFF WBC: CPT

## 2022-03-04 PROCEDURE — 83690 ASSAY OF LIPASE: CPT

## 2022-03-04 PROCEDURE — U0005 INFEC AGEN DETEC AMPLI PROBE: HCPCS | Performed by: FAMILY MEDICINE

## 2022-03-04 PROCEDURE — 36415 COLL VENOUS BLD VENIPUNCTURE: CPT

## 2022-03-04 PROCEDURE — 82043 UR ALBUMIN QUANTITATIVE: CPT | Performed by: FAMILY MEDICINE

## 2022-03-04 PROCEDURE — U0003 INFECTIOUS AGENT DETECTION BY NUCLEIC ACID (DNA OR RNA); SEVERE ACUTE RESPIRATORY SYNDROME CORONAVIRUS 2 (SARS-COV-2) (CORONAVIRUS DISEASE [COVID-19]), AMPLIFIED PROBE TECHNIQUE, MAKING USE OF HIGH THROUGHPUT TECHNOLOGIES AS DESCRIBED BY CMS-2020-01-R: HCPCS | Performed by: FAMILY MEDICINE

## 2022-03-04 PROCEDURE — 83036 HEMOGLOBIN GLYCOSYLATED A1C: CPT

## 2022-03-04 PROCEDURE — 84443 ASSAY THYROID STIM HORMONE: CPT

## 2022-03-04 PROCEDURE — 80053 COMPREHEN METABOLIC PANEL: CPT

## 2022-03-04 PROCEDURE — 82570 ASSAY OF URINE CREATININE: CPT | Performed by: FAMILY MEDICINE

## 2022-03-04 PROCEDURE — 83605 ASSAY OF LACTIC ACID: CPT

## 2022-03-04 PROCEDURE — 81001 URINALYSIS AUTO W/SCOPE: CPT | Performed by: FAMILY MEDICINE

## 2022-03-04 NOTE — PROGRESS NOTES
COVID-19 Outpatient Progress Note    Assessment/Plan:    Problem List Items Addressed This Visit        Endocrine    Type 2 diabetes mellitus with diabetic polyneuropathy ([facility] )    Relevant Orders    Lactic acid, plasma (Completed)    Hemoglobin A1C (Completed)    UA w Reflex to Microscopic w Reflex to Culture (Completed)    Microalbumin / creatinine urine ratio (Completed)    Urine Microscopic (Completed)       Other    Anxiety    Relevant Orders    TSH, 3rd generation with Free T4 reflex (Completed)      Other Visit Diagnoses     Diarrhea, unspecified type    -  Primary    ? etiology   ? secondary to metformin, increase fluid intake, follow with bland diet  if diarrhea worse or develops fever ,chills abdominal pain to go to the ER    Relevant Orders    COVID Only- Office Collect (Completed)    Comprehensive metabolic panel (Completed)    Lipase (Completed)    Lactic acid, plasma (Completed)    CBC and differential (Completed)    TSH, 3rd generation with Free T4 reflex (Completed)    UA w Reflex to Microscopic w Reflex to Culture (Completed)    Urine Microscopic (Completed)         Disposition:     Recommended patient to come to the office to test for COVID-19  Discussed with patient questionable etiology of diarrhea  Could be viral could be secondary to metformin or other things  Advised to increase fluid intake including Gatorade and follow with the bland diet  To call if not better or worse  If labs okay and her persist to consider stopping metformin  And to consider further evaluation    I have spent 15 minutes directly with the patient        Encounter provider Stephanie Rivas MD    Provider located at [address] [place] [address]  [address] [address]  [id number] [place] [id number] [phone]    Recent Visits  Date Type Provider Dept   03/04/22 Telemedicine Stephanie Rivas MD [facility]   Showing recent visits within past 7 days and meeting all other requirements  Future Appointments  No visits were found meeting these conditions  Showing future appointments within next 150 days and meeting all other requirements     This virtual check-in was done via telephone and he agrees to proceed  Patient agrees to participate in a virtual check in via telephone or video visit instead of presenting to the office to address urgent/immediate medical needs  Patient is aware this is a billable service  After connecting through Telephone, the patient was identified by name and date of birth  Jimenez Ling was informed that this was a telemedicine visit and that the exam was being conducted confidentially over secure lines  My office door was closed  No one else was in the room  Jimenez Ling acknowledged consent and understanding of privacy and security of the telemedicine visit  I informed the patient that I have reviewed his record in Epic and presented the opportunity for him to ask any questions regarding the visit today  The patient agreed to participate  It was my intent to perform this visit via video technology but the patient was not able to do a video connection so the visit was completed via audio telephone only  Verification of patient location:  Patient is located in the following state in which I hold an active license: [place]    Subjective:   Jimenez Ling is a 77 y o  male who is concerned about COVID-19  Patient's symptoms include diarrhea  Patient denies fever, chills, fatigue, malaise, congestion, rhinorrhea, sore throat, anosmia, loss of taste, cough, shortness of breath, chest tightness, abdominal pain, nausea, vomiting, myalgias and headaches       - Date of symptom onset: 2/15/2022      COVID-19 vaccination status: Fully vaccinated with booster    Exposure:   Contact with a person who is under investigation (PUI) for or who is positive for COVID-19 within the last 14 days?: No    Hospitalized recently for fever and/or lower respiratory symptoms?: No      Currently a healthcare worker that is involved in direct patient care?: No      Works in a special setting where the risk of COVID-19 transmission may be high? (this may include long-term care, correctional and skilled nursing facilities; homeless shelters; assisted-living facilities and group homes ): No      Resident in a special setting where the risk of COVID-19 transmission may be high? (this may include long-term care, correctional and skilled nursing facilities; homeless shelters; assisted-living facilities and group homes ): No      Change bowel habit started about 3 week ago  He stated a every couple days he has loose bowel movement  Until yesterday he had 3 bowel movement  Two small bowel movement the morning and 1 in the afternoon , soft   Today had  regular bowel movement couple minutes ago so far  Denied abdominal pain , rectal bleeding, nausea, vomiting ,fever or chills  He said he does not feel sec feels well overall  Diet, vagen  Diabetes  He has not been checking his blood sugar recently denied, denied polyuria or polydipsia  Patient with known anxiety  He said his anxiety is controlled    Denied depression    Lab Results   Component Value Date    SARSCOV2 Negative 03/04/2022     Past Medical History:   Diagnosis Date    Anxiety     Arthritis     BPH with obstruction/lower urinary tract symptoms     Diabetes mellitus ([facility] [address] )     Hypertension     Renal calculi     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[facility] [address] ) 10/15/2019    Ureter, calculus     Urinary stone      Past Surgical History:   Procedure Laterality Date    CARDIAC SURGERY      CHOLECYSTECTOMY      CYSTOSCOPY W/ URETEROSCOPY       Current Outpatient Medications   Medication Sig Dispense Refill    carbidopa-levodopa (SINEMET CR)  mg TBCR per ER tablet Take 1 tablet by mouth 3 (three) times a day      CHOLECALCIFEROL PO Take by mouth      fluticasone (FLONASE) 50 mcg/act nasal spray 1 spray into each nostril daily      fluticasone (FLONASE) 50 mcg/act nasal spray INSTILL 1 SPRAY IN NOSTRIL(S) TWICE A DAY FOR NASAL ALLERGIES IN EACH NOSTRIL      fluticasone (FLONASE) 50 mcg/act nasal spray 2 sprays into each nostril daily 16 g 0    glucose blood (FREESTYLE TEST STRIPS) test strip Test once daily and pRN Dx 250 00      lisinopril (ZESTRIL) 2 5 mg tablet Take 5 mg by mouth       metFORMIN (GLUCOPHAGE) 1000 MG tablet Take 1,000 mg by mouth      metoprolol succinate (TOPROL-XL) 50 mg 24 hr tablet Take 50 mg by mouth      mirtazapine (REMERON) 7 5 MG tablet Take 1 tablet (7 5 mg total) by mouth daily at bedtime (Patient taking differently: Take 15 mg by mouth daily at bedtime  ) 30 tablet 1    Multiple Vitamin (MULTIVITAMINS PO) TAKE ONE CAP/TAB BY MOUTH EVERY MORNING FOR SUPPLEMENTATION      multivitamin (THERAGRAN) TABS Take 1 tablet by mouth      QUEtiapine (SEROquel) 50 mg tablet Take 0 5 tablets by mouth daily at bedtime      simvastatin (ZOCOR) 80 mg tablet Take 80 mg by mouth      benzonatate (TESSALON PERLES) 100 mg capsule Take 1 capsule (100 mg total) by mouth 3 (three) times a day as needed for cough 20 capsule 0    co-enzyme Q-10 100 mg capsule Take 100 mg by mouth daily      CYANOCOBALAMIN PO Take by mouth      Diclofenac Sodium (VOLTAREN) 1 % APPLY 4GM TO LOWER EXTREMITIES TOPICALLY TWICE A DAY **USE DOSING CARD** (DO NOT EXCEED 16 GRAMS DAILY TO ANY AFFECTED JOINT OF THE LOWER EXTREMITIES  DO NOT EXCEED 8 GRAMS DAILY TO ANY AFFECTED JOINT OF THE UPPER EXTREMITIES   DO NOT EXCEED A TOTAL DOSE OF 32 GRAMS DAILY OVER ALL JOINTS)      melatonin 3 mg Take 4 5 mg by mouth      Multiple Vitamins-Minerals (PRESERVISION AREDS PO) Take by mouth      sildenafil (VIAGRA) 50 MG tablet TAKE ONE-HALF TABLET BY MOUTH AS DIRECTED PRIOR TO SEXUAL ACTIVITY FOR ERECTILE DYSFUNCTION (Patient not taking: Reported on 1/24/2022)      triamcinolone (KENALOG) 0 1 % cream APPLY SMALL AMOUNT TOPICALLY TWICE A DAY FOR SKIN CONDITION (Patient not taking: Reported on 3/4/2022)       No current facility-administered medications for this visit  Allergies   Allergen Reactions    Ketotifen Diarrhea and Other (See Comments)       Review of Systems   Constitutional: Negative for chills, fatigue, fever and unexpected weight change  HENT: Negative for congestion, rhinorrhea, sore throat and trouble swallowing  Eyes: Negative for visual disturbance  Respiratory: Negative for cough, chest tightness and shortness of breath  Cardiovascular: Negative for chest pain, palpitations and leg swelling  Gastrointestinal: Positive for diarrhea  Negative for abdominal pain, anal bleeding, blood in stool, constipation, nausea, rectal pain and vomiting  Endocrine: Negative for polydipsia and polyphagia  Genitourinary: Negative for dysuria, flank pain, frequency, hematuria and urgency  Musculoskeletal: Negative for arthralgias, back pain, gait problem, joint swelling and myalgias  Neurological: Negative for dizziness, numbness and headaches  Hematological: Negative for adenopathy  Psychiatric/Behavioral: The patient is not nervous/anxious  Objective:    Vitals:    03/04/22 0951   Temp: 97 8 °F (36 6 °C)       Physical Exam  Constitutional:       Comments: Doesn't sound distress   Pulmonary:      Comments: No abnormal audible breath sound  Abdominal:      Palpations: Abdomen is soft  Comments: Per patient exam   Neurological:      Mental Status: He is alert and oriented to person, place, and time  Psychiatric:         Mood and Affect: Mood normal          VIRTUAL VISIT DISCLAIMER    Damian Justyna verbally agrees to participate in [employer]   Pt is aware that [employer] could be limited without vital signs or the ability to perform a full hands-on physical Julestephania Arce understands he or the provider may request at any time to terminate the video visit and request the patient to seek care or treatment in person

## 2022-03-05 LAB — SARS-COV-2 RNA RESP QL NAA+PROBE: NEGATIVE

## 2022-03-11 ENCOUNTER — RA CDI HCC (OUTPATIENT)
Dept: OTHER | Facility: HOSPITAL | Age: 77
End: 2022-03-11

## 2022-03-11 NOTE — PROGRESS NOTES
[facility]  coding opportunities             Chart Reviewed * (Number of) Inbasket suggestions sent to Provider: 1      E11 29, R80 9 Per 3/4/22 labs            Patients insurance company: [employer]  - [facility] [employer] and Commercial)

## 2022-03-28 ENCOUNTER — HOSPITAL ENCOUNTER (OUTPATIENT)
Dept: RADIOLOGY | Facility: MEDICAL CENTER | Age: 77
Discharge: HOME | End: 2022-03-28
Attending: PHYSICAL MEDICINE & REHABILITATION | Admitting: PHYSICAL MEDICINE & REHABILITATION
Payer: COMMERCIAL

## 2022-03-28 VITALS
DIASTOLIC BLOOD PRESSURE: 94 MMHG | HEART RATE: 105 BPM | RESPIRATION RATE: 20 BRPM | TEMPERATURE: 97.3 F | OXYGEN SATURATION: 97 % | SYSTOLIC BLOOD PRESSURE: 154 MMHG

## 2022-03-28 DIAGNOSIS — M16.11 PRIMARY OSTEOARTHRITIS OF RIGHT HIP: ICD-10-CM

## 2022-03-28 PROCEDURE — 20610 DRAIN/INJ JOINT/BURSA W/O US: CPT | Performed by: PHYSICAL MEDICINE & REHABILITATION

## 2022-03-28 PROCEDURE — 77002 NEEDLE LOCALIZATION BY XRAY: CPT

## 2022-03-28 PROCEDURE — 77002 NEEDLE LOCALIZATION BY XRAY: CPT | Performed by: PHYSICAL MEDICINE & REHABILITATION

## 2022-03-28 RX ORDER — BUPIVACAINE HCL/PF 2.5 MG/ML
3 VIAL (ML) INJECTION ONCE
Status: COMPLETED | OUTPATIENT
Start: 2022-03-28 | End: 2022-03-28

## 2022-03-28 RX ORDER — METHYLPREDNISOLONE ACETATE 40 MG/ML
40 INJECTION, SUSPENSION INTRA-ARTICULAR; INTRALESIONAL; INTRAMUSCULAR; PARENTERAL; SOFT TISSUE ONCE
Status: COMPLETED | OUTPATIENT
Start: 2022-03-28 | End: 2022-03-28

## 2022-03-28 RX ADMIN — METHYLPREDNISOLONE ACETATE 40 MG: 40 INJECTION, SUSPENSION INTRA-ARTICULAR; INTRALESIONAL; INTRAMUSCULAR; PARENTERAL; SOFT TISSUE at 13:14

## 2022-03-28 RX ADMIN — Medication 3 ML: at 13:14

## 2022-03-28 RX ADMIN — IOHEXOL 2 ML: 300 INJECTION, SOLUTION INTRAVENOUS at 13:14

## 2022-03-28 NOTE — H&P
History of Present Illness:  The patient is a 69 y o  male who presents with complaints of right hip pain    Patient Active Problem List   Diagnosis    TAVARES [facility]acute kidney injury) ([facility] [address] )    Anemia    Anxiety    Back pain    Coronary atherosclerosis    Diverticulosis of colon    BPH with urinary obstruction    Essential hypertension    Fracture of C1 vertebra, closed ([facility] [address] )    Glenohumeral arthritis, right    Hearing loss    History of disease of blood and blood-forming organs    History of renal stone    Insomnia    Irritable bowel syndrome    Ischemic cardiomyopathy    IVH (intraventricular hemorrhage) ([facility])    Lipid disorder    Renal cyst    Proteinuria    Sacral fracture (HCC)    Type 2 diabetes mellitus with diabetic polyneuropathy (HCC)    Vitamin D deficiency    Hematuria    Inadequate sleep hygiene    Multiple closed fractures of ribs of left side    Bilateral carotid artery stenosis    Leukocytosis    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[facility])    Elevated prostate specific antigen (PSA)    Low serum uric acid for age   Osker Ok Primary osteoarthritis of right hip       Past Medical History:   Diagnosis Date    Anxiety     Arthritis     BPH with obstruction/lower urinary tract symptoms     Diabetes mellitus ([facility] [address] )     Hypertension     Renal calculi     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[facility] ) 10/15/2019    Ureter, calculus     Urinary stone        Past Surgical History:   Procedure Laterality Date    CARDIAC SURGERY      CHOLECYSTECTOMY      CYSTOSCOPY W/ URETEROSCOPY           Current Outpatient Medications:     benzonatate (TESSALON PERLES) 100 mg capsule, Take 1 capsule (100 mg total) by mouth 3 (three) times a day as needed for cough, Disp: 20 capsule, Rfl: 0    carbidopa-levodopa (SINEMET CR)  mg TBCR per ER tablet, Take 1 tablet by mouth 3 (three) times a day, Disp: , Rfl:     CHOLECALCIFEROL PO, Take by mouth, Disp: , Rfl:    co-enzyme Q-10 100 mg capsule, Take 100 mg by mouth daily, Disp: , Rfl:     CYANOCOBALAMIN PO, Take by mouth, Disp: , Rfl:     Diclofenac Sodium (VOLTAREN) 1 %, APPLY 4GM TO LOWER EXTREMITIES TOPICALLY TWICE A DAY **USE DOSING CARD** (DO NOT EXCEED 16 GRAMS DAILY TO ANY AFFECTED JOINT OF THE LOWER EXTREMITIES  DO NOT EXCEED 8 GRAMS DAILY TO ANY AFFECTED JOINT OF THE UPPER EXTREMITIES   DO NOT EXCEED A TOTAL DOSE OF 32 GRAMS DAILY OVER ALL JOINTS), Disp: , Rfl:     fluticasone (FLONASE) 50 mcg/act nasal spray, 1 spray into each nostril daily, Disp: , Rfl:     fluticasone (FLONASE) 50 mcg/act nasal spray, INSTILL 1 SPRAY IN NOSTRIL(S) TWICE A DAY FOR NASAL ALLERGIES IN EACH NOSTRIL, Disp: , Rfl:     fluticasone (FLONASE) 50 mcg/act nasal spray, 2 sprays into each nostril daily, Disp: 16 g, Rfl: 0    glucose blood (FREESTYLE TEST STRIPS) test strip, Test once daily and pRN Dx 250 00, Disp: , Rfl:     lisinopril (ZESTRIL) 2 5 mg tablet, Take 5 mg by mouth , Disp: , Rfl:     melatonin 3 mg, Take 4 5 mg by mouth, Disp: , Rfl:     metFORMIN (GLUCOPHAGE) 1000 MG tablet, Take 1,000 mg by mouth, Disp: , Rfl:     metoprolol succinate (TOPROL-XL) 50 mg 24 hr tablet, Take 50 mg by mouth, Disp: , Rfl:     mirtazapine (REMERON) 7 5 MG tablet, Take 1 tablet (7 5 mg total) by mouth daily at bedtime (Patient taking differently: Take 15 mg by mouth daily at bedtime  ), Disp: 30 tablet, Rfl: 1    Multiple Vitamin (MULTIVITAMINS PO), TAKE ONE CAP/TAB BY MOUTH EVERY MORNING FOR SUPPLEMENTATION, Disp: , Rfl:     Multiple Vitamins-Minerals (PRESERVISION AREDS PO), Take by mouth, Disp: , Rfl:     multivitamin (THERAGRAN) TABS, Take 1 tablet by mouth, Disp: , Rfl:     QUEtiapine (SEROquel) 50 mg tablet, Take 0 5 tablets by mouth daily at bedtime, Disp: , Rfl:     sildenafil (VIAGRA) 50 MG tablet, TAKE ONE-HALF TABLET BY MOUTH AS DIRECTED PRIOR TO SEXUAL ACTIVITY FOR ERECTILE DYSFUNCTION (Patient not taking: Reported on 1/24/2022), Disp: , Rfl:     simvastatin (ZOCOR) 80 mg tablet, Take 80 mg by mouth, Disp: , Rfl:     triamcinolone (KENALOG) 0 1 % cream, APPLY SMALL AMOUNT TOPICALLY TWICE A DAY FOR SKIN CONDITION (Patient not taking: Reported on 3/4/2022), Disp: , Rfl:     Current Facility-Administered Medications:     bupivacaine (PF) (MARCAINE) 0 25 % injection 3 mL, 3 mL, Intra-articular, Once, Marina Avina DO    iohexol (OMNIPAQUE) 300 mg/mL injection 2 mL, 2 mL, Injection, Once, Marina Avina DO    methylPREDNISolone acetate (DEPO-MEDROL) injection 40 mg, 40 mg, Intra-articular, Once, Marina Avina DO    Allergies   Allergen Reactions    Ketotifen Diarrhea and Other (See Comments)       Physical Exam:   Vitals:    03/28/22 1304   BP: 165/99   Pulse: 99   Resp: 20   Temp: (!) 97 3 °F (36 3 °C)   SpO2: 97%     General: Awake, Alert, Oriented x 3, Mood and affect appropriate  Respiratory: Respirations even and unlabored  Cardiovascular: Peripheral pulses intact; no edema  Musculoskeletal Exam: right hip pain    ASA Score: 2    Patient/Chart Verification  Patient ID Verified: Verbal  ID Band Applied: No  Consents Confirmed: Procedural,To be obtained in the Pre-Procedure area  H&P( within 30 days) Verified: To be obtained in the Pre-Procedure area  Interval H&P(within 24 hr) Complete (required for Outpatients and Surgery Admit only): To be obtained in the Pre-Procedure area  Allergies Reviewed: Yes  Anticoag/NSAID held?: NA  Currently on antibiotics?: No    Assessment:   1   Primary osteoarthritis of right hip        Plan: RT HIP INJ-PAMELLA

## 2022-03-28 NOTE — DISCHARGE INSTRUCTIONS

## 2022-04-04 ENCOUNTER — TELEPHONE (OUTPATIENT)
Dept: PAIN MEDICINE | Facility: CLINIC | Age: 77
End: 2022-04-04

## 2022-05-11 ENCOUNTER — TELEPHONE (OUTPATIENT)
Dept: FAMILY MEDICINE CLINIC | Facility: CLINIC | Age: 77
End: 2022-05-11

## 2022-05-24 ENCOUNTER — TELEPHONE (OUTPATIENT)
Dept: FAMILY MEDICINE CLINIC | Facility: CLINIC | Age: 77
End: 2022-05-24

## 2022-05-24 NOTE — TELEPHONE ENCOUNTER
Patient called in stating that he was having chest pains around 11:00 pm, last night  Stating that his pain was a 9 out of 10 and couldn't sleep right  He said he's still feeling some pain this morning and asked what to do? He said he thought it was a heart attack? I said he should call an ambulance or have someone take him to the ED right away and to not drive himself  He was agreeable

## 2022-05-25 ENCOUNTER — TELEPHONE (OUTPATIENT)
Dept: FAMILY MEDICINE CLINIC | Facility: CLINIC | Age: 77
End: 2022-05-25

## 2022-10-03 ENCOUNTER — TELEPHONE (OUTPATIENT)
Dept: FAMILY MEDICINE CLINIC | Facility: CLINIC | Age: 77
End: 2022-10-03

## 2022-10-03 ENCOUNTER — OFFICE VISIT (OUTPATIENT)
Dept: FAMILY MEDICINE CLINIC | Facility: CLINIC | Age: 77
End: 2022-10-03
Payer: COMMERCIAL

## 2022-10-03 VITALS
HEART RATE: 101 BPM | DIASTOLIC BLOOD PRESSURE: 81 MMHG | WEIGHT: 133.6 LBS | OXYGEN SATURATION: 98 % | SYSTOLIC BLOOD PRESSURE: 151 MMHG | TEMPERATURE: 95.6 F | BODY MASS INDEX: 22.93 KG/M2

## 2022-10-03 DIAGNOSIS — R94.31 ABNORMAL EKG: ICD-10-CM

## 2022-10-03 DIAGNOSIS — R00.0 SINUS TACHYCARDIA: ICD-10-CM

## 2022-10-03 DIAGNOSIS — R00.0 INCREASED HEART RATE: ICD-10-CM

## 2022-10-03 DIAGNOSIS — I49.3 PVC (PREMATURE VENTRICULAR CONTRACTION): ICD-10-CM

## 2022-10-03 DIAGNOSIS — L30.9 DERMATITIS: Primary | ICD-10-CM

## 2022-10-03 DIAGNOSIS — I25.5 ISCHEMIC CARDIOMYOPATHY: ICD-10-CM

## 2022-10-03 PROCEDURE — 99214 OFFICE O/P EST MOD 30 MIN: CPT | Performed by: FAMILY MEDICINE

## 2022-10-03 RX ORDER — CLOTRIMAZOLE AND BETAMETHASONE DIPROPIONATE 10; .64 MG/G; MG/G
CREAM TOPICAL 2 TIMES DAILY
Qty: 30 G | Refills: 0 | Status: SHIPPED | OUTPATIENT
Start: 2022-10-03

## 2022-10-03 RX ORDER — ALOGLIPTIN 25 MG/1
0.5 TABLET, FILM COATED ORAL DAILY
COMMUNITY
Start: 2022-09-30

## 2022-10-03 NOTE — PATIENT INSTRUCTIONS
To call us when he gets home to see if he is taking metoprolol    Develops chest pain or palpitation to go to the ER   Follow-up in the next couple weeks to recheck blood pressure address other chronic medical condition and check or Medicare wellness

## 2022-10-03 NOTE — PROGRESS NOTES
Assessment/Plan:       No problem-specific Assessment & Plan notes found for this encounter  Diagnoses and all orders for this visit:    Dermatitis  Comments: To wear cotton underwear  Call if any further pain, or symptoms  Orders:  -     clotrimazole-betamethasone (LOTRISONE) 1-0 05 % cream; Apply topically 2 (two) times a day    Increased heart rate  Comments:  Patient is asymptomatic advised patient to go to the ER if he develops chest pain or palpitation  Orders:  -     POCT ECG    Sinus tachycardia  Comments:  Patient will call us when he goes home to see if he still taking Toprol or not  Consider increase it if patient has been taking Toprol    PVC (premature ventricular contraction)  Comments:  Asymptomatic  Patient is following with Cardiology    Abnormal EKG  Comments:  Reading previous EKG from [facility] it seems his EKG stable ,   Contact Cardiology to discuss it with them   EKG was faxed to them  Ischemic cardiomyopathy  Comments:  Doing well  Recent cardiology consult noted  Patient is following with Cardiology    Other orders  -     Alogliptin Benzoate 25 MG TABS; Take 0 5 tablets by mouth daily        Patient Instructions   To call us when he gets home to see if he is taking metoprolol    Develops chest pain or palpitation to go to the ER   Follow-up in the next couple weeks to recheck blood pressure address other chronic medical condition and check or Medicare wellness  Orders Placed This Encounter   Procedures    POCT ECG         Subjective:     Patient ID: Christin Puga is a 69 y o  male      HPI     Acute care  Testicular pain  Patient stated in the last 2 weeks he notice some discomfort at the left testicle  It is sensitive to touch and also he fees sometime  when he tried to get to the car  Otherwise he denied pain denied swelling of the testicle  Denied mass  He denied any injury    Diabetes    Patient stated his follow with Endocrinology and she checked his feet  CAD, saw cariology , recently had echo , stress test   Patient denied chest pain    His heart rate is increased at this office visit  Patient denied chest pain, shortness O breath, palpitation or dizziness  He was not able to confirm if he is taking metoprolol at this office visit  Patient with known cardiomyopathy and PVC    Labs 9-6-22 noted    Had flu shot one week week at the pharmacy    Review of Systems   Constitutional: Negative for appetite change, chills, diaphoresis and fatigue  HENT: Negative for ear pain, tinnitus, trouble swallowing and voice change  Eyes: Negative for photophobia, pain and visual disturbance  Respiratory: Negative for cough, chest tightness and wheezing  Cardiovascular: Negative for chest pain, palpitations and leg swelling  Gastrointestinal: Negative for abdominal distention, abdominal pain, anal bleeding, constipation, diarrhea, nausea and rectal pain  Endocrine: Negative for cold intolerance, heat intolerance, polydipsia and polyuria  Genitourinary: Positive for testicular pain  Negative for decreased urine volume, difficulty urinating, dysuria, flank pain, frequency, hematuria, penile pain, penile swelling, scrotal swelling and urgency  Musculoskeletal: Negative for arthralgias, back pain, gait problem, myalgias and neck pain  Skin: Negative for pallor and rash  Allergic/Immunologic: Negative for immunocompromised state  Neurological: Negative for dizziness, tremors, seizures, syncope, speech difficulty, light-headedness and headaches  Hematological: Negative for adenopathy  Does not bruise/bleed easily  Psychiatric/Behavioral: Negative for agitation, confusion and hallucinations  The patient is not nervous/anxious  Objective:     Physical Exam  Constitutional:       General: He is not in acute distress  Appearance: Normal appearance  He is well-developed  He is not ill-appearing, toxic-appearing or diaphoretic  HENT:      Head: Normocephalic  Eyes:      General: No scleral icterus  Right eye: No discharge  Left eye: No discharge  Pupils: Pupils are equal, round, and reactive to light  Neck:      Thyroid: No thyromegaly  Vascular: No carotid bruit or JVD  Cardiovascular:      Rate and Rhythm: Normal rate  Heart sounds: Normal heart sounds  No murmur heard  No gallop  Comments: Regular rhythm with occasional extra beat  Pulmonary:      Effort: Pulmonary effort is normal       Breath sounds: Normal breath sounds  Abdominal:      General: Bowel sounds are normal  There is no distension  Palpations: Abdomen is soft  There is no mass  Tenderness: There is no abdominal tenderness  There is no guarding or rebound  Genitourinary:     Comments: Both testicle normal size  No swelling no tenderness no mass  There is a slight eczematous skin changes of the left scrotum  Musculoskeletal:         General: No swelling or tenderness  Normal range of motion  Cervical back: Neck supple  Right lower leg: No edema  Left lower leg: No edema  Lymphadenopathy:      Cervical: No cervical adenopathy  Skin:     Coloration: Skin is not jaundiced or pale  Findings: No rash  Neurological:      General: No focal deficit present  Mental Status: He is alert and oriented to person, place, and time  Cranial Nerves: No cranial nerve deficit  Sensory: No sensory deficit  Motor: No weakness or abnormal muscle tone  Coordination: Coordination normal       Gait: Gait normal    Psychiatric:         Mood and Affect: Mood normal          Behavior: Behavior normal          Thought Content:  Thought content normal

## 2022-10-03 NOTE — TELEPHONE ENCOUNTER
Eddy Warenr, nurse practitioner from [facility]  Returned the call about patient today because he has abnormal EKG  Discussed with her patient came to the office for testicular pain  heart rate is high, patient is completely asymptomatic regarding his heart  his EKG sinus was remarkable for tachycardia with other abnormality, we did fax copy to her  EKG overall stable   patient is not taking his metoprolol for the last 2 days    Was told to take metoprolol  She said he had an appointment on October 18, they will repeat EKG

## 2022-10-04 PROCEDURE — 93000 ELECTROCARDIOGRAM COMPLETE: CPT | Performed by: FAMILY MEDICINE

## 2022-10-23 ENCOUNTER — HOSPITAL ENCOUNTER (OUTPATIENT)
Dept: CT IMAGING | Facility: HOSPITAL | Age: 77
Discharge: HOME | End: 2022-10-23
Payer: COMMERCIAL

## 2022-10-23 DIAGNOSIS — R47.81 SLURRED SPEECH: ICD-10-CM

## 2022-10-23 PROCEDURE — 70450 CT HEAD/BRAIN W/O DYE: CPT

## 2023-01-09 DIAGNOSIS — E11.42 TYPE 2 DIABETES MELLITUS WITH DIABETIC POLYNEUROPATHY, WITHOUT LONG-TERM CURRENT USE OF INSULIN (HCC): Primary | ICD-10-CM

## 2023-01-12 ENCOUNTER — TELEPHONE (OUTPATIENT)
Dept: FAMILY MEDICINE CLINIC | Facility: CLINIC | Age: 78
End: 2023-01-12

## 2023-01-12 NOTE — TELEPHONE ENCOUNTER
I spoke to patient he called in stating that the embrace is not a brand that [facility] pharmacy carries  I went online and found a number [phone] and called them  They said that they do not bill insurance they sell the product but it would have to be paid without insurance  The fax number if they need a script is [phone]  I do not think that this is the company that he got from the first time

## 2023-01-26 ENCOUNTER — RA CDI HCC (OUTPATIENT)
Dept: OTHER | Facility: HOSPITAL | Age: 78
End: 2023-01-26

## 2023-01-26 NOTE — PROGRESS NOTES
[facility]  coding opportunities     E11 22 and E11 29     Chart Reviewed number of suggestions sent to Provider: 2     Patients Insurance     Medicare Insurance: [address] [place] [address]

## 2023-01-27 ENCOUNTER — LAB (OUTPATIENT)
Dept: LAB | Facility: CLINIC | Age: 78
End: 2023-01-27

## 2023-01-27 ENCOUNTER — OFFICE VISIT (OUTPATIENT)
Dept: FAMILY MEDICINE CLINIC | Facility: CLINIC | Age: 78
End: 2023-01-27

## 2023-01-27 VITALS
WEIGHT: 128 LBS | BODY MASS INDEX: 22.68 KG/M2 | TEMPERATURE: 97.5 F | OXYGEN SATURATION: 98 % | DIASTOLIC BLOOD PRESSURE: 88 MMHG | SYSTOLIC BLOOD PRESSURE: 140 MMHG | HEIGHT: 63 IN | HEART RATE: 82 BPM

## 2023-01-27 DIAGNOSIS — H35.3230 BILATERAL EXUDATIVE AGE-RELATED MACULAR DEGENERATION, UNSPECIFIED STAGE (HCC): ICD-10-CM

## 2023-01-27 DIAGNOSIS — N18.31 STAGE 3A CHRONIC KIDNEY DISEASE (HCC): ICD-10-CM

## 2023-01-27 DIAGNOSIS — E78.9 LIPID DISORDER: ICD-10-CM

## 2023-01-27 DIAGNOSIS — I10 ESSENTIAL HYPERTENSION: ICD-10-CM

## 2023-01-27 DIAGNOSIS — Z00.00 MEDICARE ANNUAL WELLNESS VISIT, SUBSEQUENT: Primary | ICD-10-CM

## 2023-01-27 DIAGNOSIS — I25.5 ISCHEMIC CARDIOMYOPATHY: ICD-10-CM

## 2023-01-27 DIAGNOSIS — E11.42 TYPE 2 DIABETES MELLITUS WITH DIABETIC POLYNEUROPATHY, WITHOUT LONG-TERM CURRENT USE OF INSULIN (HCC): ICD-10-CM

## 2023-01-27 PROBLEM — N17.9 AKI (ACUTE KIDNEY INJURY) (HCC): Status: RESOLVED | Noted: 2017-01-24 | Resolved: 2023-01-27

## 2023-01-27 PROBLEM — R31.9 HEMATURIA: Status: RESOLVED | Noted: 2018-12-03 | Resolved: 2023-01-27

## 2023-01-27 PROBLEM — G20 PARKINSON'S DISEASE (HCC): Status: ACTIVE | Noted: 2023-01-27

## 2023-01-27 PROBLEM — Z72.821 INADEQUATE SLEEP HYGIENE: Status: RESOLVED | Noted: 2018-12-11 | Resolved: 2023-01-27

## 2023-01-27 PROBLEM — G20.A1 PARKINSON'S DISEASE: Status: ACTIVE | Noted: 2023-01-27

## 2023-01-27 PROBLEM — R79.89: Status: RESOLVED | Noted: 2019-10-18 | Resolved: 2023-01-27

## 2023-01-27 PROBLEM — S22.42XA MULTIPLE CLOSED FRACTURES OF RIBS OF LEFT SIDE: Status: RESOLVED | Noted: 2019-01-09 | Resolved: 2023-01-27

## 2023-01-27 PROBLEM — R97.20 ELEVATED PROSTATE SPECIFIC ANTIGEN (PSA): Status: RESOLVED | Noted: 2019-10-18 | Resolved: 2023-01-27

## 2023-01-27 LAB
ANION GAP SERPL CALCULATED.3IONS-SCNC: 7 MMOL/L (ref 4–13)
BUN SERPL-MCNC: 39 MG/DL (ref 5–25)
CALCIUM SERPL-MCNC: 10.4 MG/DL (ref 8.3–10.1)
CHLORIDE SERPL-SCNC: 105 MMOL/L (ref 96–108)
CO2 SERPL-SCNC: 22 MMOL/L (ref 21–32)
CREAT SERPL-MCNC: 1.33 MG/DL (ref 0.6–1.3)
CREAT UR-MCNC: 76.9 MG/DL
GFR SERPL CREATININE-BSD FRML MDRD: 51 ML/MIN/1.73SQ M
GLUCOSE SERPL-MCNC: 145 MG/DL (ref 65–140)
MICROALBUMIN UR-MCNC: 995 MG/L (ref 0–20)
MICROALBUMIN/CREAT 24H UR: 1294 MG/G CREATININE (ref 0–30)
POTASSIUM SERPL-SCNC: 4.5 MMOL/L (ref 3.5–5.3)
SL AMB POCT HEMOGLOBIN AIC: 6 (ref ?–6.5)
SODIUM SERPL-SCNC: 134 MMOL/L (ref 135–147)

## 2023-01-27 RX ORDER — SACUBITRIL AND VALSARTAN 24; 26 MG/1; MG/1
1 TABLET, FILM COATED ORAL 2 TIMES DAILY
COMMUNITY
Start: 2022-12-29

## 2023-01-27 RX ORDER — FUROSEMIDE 40 MG/1
40 TABLET ORAL DAILY
COMMUNITY
Start: 2023-01-21

## 2023-01-27 RX ORDER — TRAMADOL HYDROCHLORIDE 50 MG/1
50 TABLET ORAL EVERY 12 HOURS
Qty: 10 TABLET | Refills: 0 | Status: SHIPPED | OUTPATIENT
Start: 2023-01-27

## 2023-01-27 RX ORDER — ZOLPIDEM TARTRATE 10 MG/1
1 TABLET ORAL
COMMUNITY
Start: 2022-12-21

## 2023-01-27 RX ORDER — ERYTHROMYCIN 5 MG/G
OINTMENT OPHTHALMIC
COMMUNITY
Start: 2023-01-24

## 2023-01-27 NOTE — PROGRESS NOTES
Patient's shoes and socks removed  Right Foot/Ankle   Right Foot Inspection  Skin Exam: skin normal and skin intact  No dry skin, no warmth, no callus, no erythema, no maceration, no abnormal color, no pre-ulcer, no ulcer and no callus  Toe Exam: ROM and strength within normal limits  Sensory   Proprioception: intact  Monofilament testing: intact    Vascular  The right DP pulse is 2+  The right PT pulse is 2+  Left Foot/Ankle  Left Foot Inspection  Skin Exam: skin normal and skin intact  No dry skin, no warmth, no erythema, no maceration, normal color, no pre-ulcer, no ulcer and no callus  Toe Exam: ROM and strength within normal limits  Sensory   Proprioception: intact  Monofilament testing: intact    Vascular  The left DP pulse is 2+  The left PT pulse is 2+  Assign Risk Category  No deformity present  No loss of protective sensation  No weak pulses  Risk: 0       Assessment and Plan:     Problem List Items Addressed This Visit        Endocrine    Type 2 diabetes mellitus with diabetic polyneuropathy ([facility] )       Lab Results   Component Value Date    HGBA1C 6 01/27/2023     - Well controlled  - Continue current regimen of metformin 1000mg daily and Alogliptin 12 5mg daily          Relevant Orders    POCT hemoglobin A1c (Completed)    Basic metabolic panel    Microalbumin / creatinine urine ratio       Cardiovascular and Mediastinum    Essential hypertension     - Borderline at today's office visit  - Continue current regimen of Metoprolol 50mg daily          Relevant Medications    furosemide (LASIX) 40 mg tablet    Ischemic cardiomyopathy     - Patient recently hospitalized from 12/27- 12/29 for acute CHF exacerbation   -  Echo from 6/72/11 reviewed: Systolic function is severely decreased with an ejection fraction of 30-35%  Global hypokinesis  Inferior and Inferolateral wall akinesis   There is grade II (moderate) diastolic dysfunction and elevated left atrial pressure (restrictive physiology)  - Follows regularly with cardiology and has an upcoming appointment in [facility]  - Continue management per cardiology with Lasix 40mg daily and entresto 24/26mg BID  - Continue to maintain 2 gram sodium diet and 32-64 oz fluid daily           Relevant Medications    Entresto 24-26 MG TABS       Genitourinary    Stage 3a chronic kidney disease ([facility] )     Lab Results   Component Value Date    EGFR 59 03/04/2022    CREATININE 1 19 03/04/2022     - Repeat BMP and urine microalbumin ordered   - Avoid NSAIDs and other nephrotoxic agents         Relevant Medications    furosemide (LASIX) 40 mg tablet       Other    Lipid disorder     - Continue Simvastatin 80mg daily          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 macular degeneration, unspecified stage (HCC)     - Patient given short course of Tramadol 50 mg Q12 H for pain relief should he need it following the surgery (PDMP reviewed); discussed risk of possible serotonin syndrome with concomitant use of Mirtazepine         Relevant Medications    erythromycin (ILOTYCIN) ophthalmic ointment    traMADol (Ultram) 50 mg tablet   Other Visit Diagnoses     Medicare annual wellness visit, subsequent    -  Primary    Relevant Orders    POCT hemoglobin A1c (Completed)           Preventive health issues were discussed with patient, and age appropriate screening tests were ordered as noted in patient's After Visit Summary  Personalized health advice and appropriate referrals for health education or preventive services given if needed, as noted in patient's After Visit Summary  History of Present Illness:     Patient presents for a Medicare Wellness Visit    JANA Munguia is a 69year old male with a past medical history of well controlled type 2 diabetes mellitus, hypertension, Parkinson's disease, cardiomyopathy, CKD and dyslipidemia who presents today for a Clara   Patient states that earlier this week he got an injection in his right eye for his macular degeneration and reports that he was in so much pain  He is due to get an injection in the left eye and is requesting pain medication should he experience the pain again  Apart from that he feels well  He was recently admitted 12/27 - 12/29 for a CHF exacerbation and has been following with cardiology closely  He denies any chest pain, shortness of breath or leg swelling  He continues to follow with other physicians at the [facility] also  Patient Care Team:  Abel Hollingsworth MD as PCP - General (Family Medicine)  DO Robert Jalloh MD Darlene Fanning, MD (Urology)  Karri Martinez DO (Inactive) (Sleep Medicine)  Kaylee Acuna MD (Cardiology)     Review of Systems:     Review of Systems   Constitutional: Negative  HENT: Negative  Eyes: Positive for pain  Respiratory: Negative  Cardiovascular: Negative  Gastrointestinal: Negative  Musculoskeletal: Negative  Skin: Negative  Neurological: Negative  Psychiatric/Behavioral: Negative           Problem List:     Patient Active Problem List   Diagnosis   • Anemia   • Anxiety   • Back pain   • Coronary atherosclerosis   • Diverticulosis of colon   • BPH with urinary obstruction   • Essential hypertension   • Fracture of C1 vertebra, closed ([facility])   • Glenohumeral arthritis, right   • Hearing loss   • History of disease of blood and blood-forming organs   • Insomnia   • Irritable bowel syndrome   • Ischemic cardiomyopathy   • IVH (intraventricular hemorrhage) ([facility])   • Lipid disorder   • Renal cyst   • Proteinuria   • Sacral fracture ([facility])   • Type 2 diabetes mellitus with diabetic polyneuropathy ([facility])   • Vitamin D deficiency   • Bilateral carotid artery stenosis   • Leukocytosis   • 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[facility] )   • Primary osteoarthritis of right hip   • 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 macular degeneration, unspecified stage ([facility])   • Stage 3a chronic kidney disease ([facility] )   • Parkinson's disease ([place] [id number] )      Past Medical and Surgical History:     Past Medical History:   Diagnosis Date   • Anxiety    • Arthritis    • BPH with obstruction/lower urinary tract symptoms    • Diabetes mellitus ([place] [address] )    • Hypertension    • Renal calculi    • 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[facility] ) 10/15/2019   • Ureter, calculus    • Urinary stone      Past Surgical History:   Procedure Laterality Date   • CARDIAC SURGERY     • CHOLECYSTECTOMY     • CYSTOSCOPY W/ URETEROSCOPY        Family History:     Family History   Problem Relation Age of Onset   • Cancer Mother    • Arthritis Father    • Heart disease Father         cardiac disorder   • Cancer Family    • Arthritis Family       Social History:     Social History     Socioeconomic History   • Marital status: Single     Spouse name: Not on file   • Number of children: Not on file   • Years of education: Not on file   • Highest education level: Not on file   Occupational History   • Occupation: retired   Tobacco Use   • Smoking status: Never   • Smokeless tobacco: Never   Vaping Use   • Vaping Use: Never used   Substance and Sexual Activity   • Alcohol use: Yes     Alcohol/week: 21 0 standard drinks     Types: 14 Cans of beer, 7 Shots of liquor per week     Comment: occ   • Drug use: No   • Sexual activity: Not on file   Other Topics Concern   • Not on file   Social History Narrative    Caffeine use      Social Determinants of Health     Financial Resource Strain: Low Risk    • Difficulty of Paying Living Expenses: Not hard at all   Food Insecurity: Not on file   Transportation Needs: No Transportation Needs   • Lack of Transportation (Medical): No   • Lack of Transportation (Non-Medical):  No   Physical Activity: Not on file   Stress: Not on file   Social Connections: Not on file   Intimate Partner Violence: Not on file   Housing Stability: Not on file      Medications and Allergies:     Current Outpatient Medications Medication Sig Dispense Refill   • carbidopa-levodopa (SINEMET CR)  mg TBCR per ER tablet Take 1 tablet by mouth 3 (three) times a day     • erythromycin (ILOTYCIN) ophthalmic ointment apply A 1 CM RIBBON OF OINTMENT INTO LOWER CONJUNCTIVAL SAC OF RI     (REFER TO PRESCRIPTION NOTES)  • fluticasone (FLONASE) 50 mcg/act nasal spray INSTILL 1 SPRAY IN NOSTRIL(S) TWICE A DAY FOR NASAL ALLERGIES IN EACH NOSTRIL     • metFORMIN (GLUCOPHAGE) 1000 MG tablet Take 1,000 mg by mouth     • metoprolol succinate (TOPROL-XL) 50 mg 24 hr tablet Take 50 mg by mouth     • Multiple Vitamin (MULTIVITAMINS PO) TAKE ONE CAP/TAB BY MOUTH EVERY MORNING FOR SUPPLEMENTATION     • simvastatin (ZOCOR) 80 mg tablet Take 80 mg by mouth     • traMADol (Ultram) 50 mg tablet Take 1 tablet (50 mg total) by mouth every 12 (twelve) hours 10 tablet 0   • zolpidem (AMBIEN) 10 mg tablet Take 1 tablet by mouth daily at bedtime as needed     • Alogliptin Benzoate 25 MG TABS Take 0 5 tablets by mouth daily     • clotrimazole-betamethasone (LOTRISONE) 1-0 05 % cream Apply topically 2 (two) times a day 30 g 0   • Entresto 24-26 MG TABS Take 1 tablet by mouth 2 (two) times a day     • furosemide (LASIX) 40 mg tablet Take 40 mg by mouth daily     • glucose blood test strip Test once daily and as needed (Patient not taking: Reported on 1/27/2023) 300 each 3   • melatonin 3 mg Take 4 5 mg by mouth Only PRN     • mirtazapine (REMERON) 7 5 MG tablet Take 1 tablet (7 5 mg total) by mouth daily at bedtime (Patient taking differently: Take 15 mg by mouth daily at bedtime) 30 tablet 1   • QUEtiapine (SEROquel) 50 mg tablet Take 0 5 tablets by mouth daily at bedtime       No current facility-administered medications for this visit       Allergies   Allergen Reactions   • Ketotifen Diarrhea and Other (See Comments)      Immunizations:     Immunization History   Administered Date(s) Administered   • COVID-19 MODERNA VACC 0 5 ML IM 02/08/2021, 03/08/2021, 12/07/2021   • H1N1, All Formulations 12/01/2009   • INFLUENZA 10/01/2002, 12/06/2002, 10/01/2003, 10/26/2005, 11/27/2006, 10/15/2007, 11/12/2008, 09/14/2009, 11/08/2010, 10/19/2011, 10/15/2012, 10/31/2012, 11/14/2012, 10/02/2013, 10/28/2014, 01/01/2016, 10/18/2016, 09/01/2018, 10/09/2018, 10/30/2019, 10/01/2021, 10/17/2021, 09/15/2022   • Influenza Quadrivalent Preservative Free 3 years and older IM 10/09/2018, 10/30/2019   • Influenza, injectable, quadrivalent, preservative free 0 5 mL 10/09/2018, 10/30/2019   • Influenza, seasonal, injectable, preservative free 10/07/2015, 10/18/2016, 11/15/2017, 10/15/2018   • Pneumococcal 10/26/2005, 01/01/2016   • Pneumococcal Conjugate 13-Valent 12/22/2016   • Pneumococcal Polysaccharide PPV23 10/26/2005, 05/17/2012, 01/10/2018, 11/03/2021   • Tdap 01/21/2011, 07/07/2015   • Zoster 08/01/2012, 07/19/2013   • Zoster Vaccine Recombinant 01/30/2020, 09/22/2020, 10/25/2021, 01/13/2022      Health Maintenance:         Topic Date Due   • Hepatitis C Screening  Completed   • Colorectal Cancer Screening  Discontinued         Topic Date Due   • COVID-19 Vaccine (4 - Booster for Evorn [place] series) 02/01/2022      Medicare Screening Tests and Risk Assessments:     Grant Booker is here for his Subsequent Wellness visit  Health Risk Assessment:   Patient rates overall health as good  Patient feels that their physical health rating is same  Patient is satisfied with their life  Eyesight was rated as same  Hearing was rated as same  Patient feels that their emotional and mental health rating is same  Patients states they are never, rarely angry  Patient states they are sometimes unusually tired/fatigued  Pain experienced in the last 7 days has been a lot  Patient's pain rating has been 10/10  Patient states that he has experienced no weight loss or gain in last 6 months  Fall Risk Screening:    In the past year, patient has experienced: no history of falling in past year      Home Safety:  Patient does not have trouble with stairs inside or outside of their home  Patient has working smoke alarms and has no working carbon monoxide detector  Home safety hazards include: none  Nutrition:   vegeterean    Medications:   Patient is currently taking over-the-counter supplements  OTC medications include: see medication list  Patient is able to manage medications  Activities of Daily Living (ADLs)/Instrumental Activities of Daily Living (IADLs):   Walk and transfer into and out of bed and chair?: Yes  Dress and groom yourself?: Yes    Bathe or shower yourself?: Yes    Feed yourself? Yes  Do your laundry/housekeeping?: No  Manage your money, pay your bills and track your expenses?: Yes  Make your own meals?: Yes    Do your own shopping?: Yes    Previous Hospitalizations:   Any hospitalizations or ED visits within the last 12 months?: Yes    How many hospitalizations have you had in the last year?: 1-2    Advance Care Planning:   Living will: Yes    Advanced directive: Yes    Five wishes given: Yes      PREVENTIVE SCREENINGS      Cardiovascular Screening:    General: Screening Current      Diabetes Screening:     General: Screening Not Indicated and History Diabetes      Prostate Cancer Screening:    General: Screening Not Indicated      Lung Cancer Screening:     General: Screening Not Indicated      Hepatitis C Screening:    General: Screening Current    Screening, Brief Intervention, and Referral to Treatment (SBIRT)    Screening    Typical number of drinks in a week: 7    Single Item Drug Screening:  How often have you used an illegal drug (including marijuana) or a prescription medication for non-medical reasons in the past year? never    Single Item Drug Screen Score: 0  Interpretation: Negative screen for possible drug use disorder    Review of Current Opioid Use    Opioid Risk Tool (ORT) Interpretation: Complete Opioid Risk Tool (ORT)    No results found       Physical Exam:     /88 (BP Location: Left arm, Patient Position: Sitting, Cuff Size: Adult)   Pulse 82   Temp 97 5 °F (36 4 °C) (Temporal)   Ht 5' 3 39" (1 61 m)   Wt 58 1 kg (128 lb)   SpO2 98%   BMI 22 40 kg/m²     Physical Exam  Constitutional:       General: He is not in acute distress  Appearance: He is not ill-appearing  HENT:      Head: Normocephalic and atraumatic  Eyes:      General:         Right eye: No discharge  Left eye: No discharge  Extraocular Movements: Extraocular movements intact  Cardiovascular:      Rate and Rhythm: Normal rate  Pulses: Normal pulses  no weak pulses          Dorsalis pedis pulses are 2+ on the right side and 2+ on the left side  Posterior tibial pulses are 2+ on the right side and 2+ on the left side  Pulmonary:      Effort: Pulmonary effort is normal    Abdominal:      General: Bowel sounds are normal  There is no distension  Palpations: Abdomen is soft  Tenderness: There is no abdominal tenderness  There is no guarding  Musculoskeletal:      Right lower leg: No edema  Left lower leg: No edema  Feet:      Right foot:      Skin integrity: No ulcer, skin breakdown, erythema, warmth, callus or dry skin  Left foot:      Skin integrity: No ulcer, skin breakdown, erythema, warmth, callus or dry skin  Neurological:      General: No focal deficit present  Mental Status: He is alert     Psychiatric:         Mood and Affect: Mood normal          Behavior: Behavior normal           Musa Reyes MD

## 2023-01-27 NOTE — ASSESSMENT & PLAN NOTE
Lab Results   Component Value Date    HGBA1C 6 01/27/2023     - Well controlled  - Continue current regimen of metformin 1000mg daily and Alogliptin 12 5mg daily

## 2023-01-27 NOTE — ASSESSMENT & PLAN NOTE
- Patient given short course of Tramadol 50 mg Q12 H for pain relief should he need it following the surgery (PDMP reviewed); discussed risk of possible serotonin syndrome with concomitant use of Mirtazepine

## 2023-01-27 NOTE — ASSESSMENT & PLAN NOTE
- Patient recently hospitalized from 12/27- 12/29 for acute CHF exacerbation   -  Echo from 3/51/53 reviewed: Systolic function is severely decreased with an ejection fraction of 30-35%  Global hypokinesis  Inferior and Inferolateral wall akinesis  There is grade II (moderate) diastolic dysfunction and elevated left atrial pressure (restrictive physiology)     - Follows regularly with cardiology and has an upcoming appointment in [facility]  - Continue management per cardiology with Lasix 40mg daily and entresto 24/26mg BID  - Continue to maintain 2 gram sodium diet and 32-64 oz fluid daily

## 2023-01-27 NOTE — ASSESSMENT & PLAN NOTE
Lab Results   Component Value Date    EGFR 59 03/04/2022    CREATININE 1 19 03/04/2022     - Repeat BMP and urine microalbumin ordered   - Avoid NSAIDs and other nephrotoxic agents

## 2023-01-27 NOTE — PATIENT INSTRUCTIONS
Medicare Preventive Visit Patient Instructions  Thank you for completing your Welcome to Medicare Visit or Medicare Annual Wellness Visit today  Your next wellness visit will be due in one year (1/28/2024)  The screening/preventive services that you may require over the next 5-10 years are detailed below  Some tests may not apply to you based off risk factors and/or age  Screening tests ordered at today's visit but not completed yet may show as past due  Also, please note that scanned in results may not display below  Preventive Screenings:  Service Recommendations Previous Testing/Comments   Colorectal Cancer Screening  · Colonoscopy    · Fecal Occult Blood Test (FOBT)/Fecal Immunochemical Test (FIT)  · Fecal DNA/Cologuard Test  · Flexible Sigmoidoscopy Age: 39-70 years old   Colonoscopy: every 10 years (May be performed more frequently if at higher risk)  OR  FOBT/FIT: every 1 year  OR  Cologuard: every 3 years  OR  Sigmoidoscopy: every 5 years  Screening may be recommended earlier than age 39 if at higher risk for colorectal cancer  Also, an individualized decision between you and your healthcare provider will decide whether screening between the ages of 74-80 would be appropriate  Colonoscopy: 03/19/2019  FOBT/FIT: Not on file  Cologuard: Not on file  Sigmoidoscopy: Not on file          Prostate Cancer Screening Individualized decision between patient and health care provider in men between ages of 53-78   Medicare will cover every 12 months beginning on the day after your 50th birthday PSA: 4 9 ng/mL           Hepatitis C Screening Once for adults born between 1945 and 1965  More frequently in patients at high risk for Hepatitis C Hep C Antibody: 06/10/2020        Diabetes Screening 1-2 times per year if you're at risk for diabetes or have pre-diabetes Fasting glucose: 122 mg/dL (3/4/2022)  A1C: 5 8 % (3/4/2022)      Cholesterol Screening Once every 5 years if you don't have a lipid disorder   May order more often based on risk factors  Lipid panel: 10/12/2022         Other Preventive Screenings Covered by Medicare:  1  Abdominal Aortic Aneurysm (AAA) Screening: covered once if your at risk  You're considered to be at risk if you have a family history of AAA or a male between the age of 73-68 who smoking at least 100 cigarettes in your lifetime  2  Lung Cancer Screening: covers low dose CT scan once per year if you meet all of the following conditions: (1) Age 50-69; (2) No signs or symptoms of lung cancer; (3) Current smoker or have quit smoking within the last 15 years; (4) You have a tobacco smoking history of at least 20 pack years (packs per day x number of years you smoked); (5) You get a written order from a healthcare provider  3  Glaucoma Screening: covered annually if you're considered high risk: (1) You have diabetes OR (2) Family history of glaucoma OR (3)  aged 48 and older OR (3)  American aged 72 and older  3  Osteoporosis Screening: covered every 2 years if you meet one of the following conditions: (1) Have a vertebral abnormality; (2) On glucocorticoid therapy for more than 3 months; (3) Have primary hyperparathyroidism; (4) On osteoporosis medications and need to assess response to drug therapy  5  HIV Screening: covered annually if you're between the age of 12-76  Also covered annually if you are younger than 13 and older than 72 with risk factors for HIV infection  For pregnant patients, it is covered up to 3 times per pregnancy      Immunizations:  Immunization Recommendations   Influenza Vaccine Annual influenza vaccination during flu season is recommended for all persons aged >= 6 months who do not have contraindications   Pneumococcal Vaccine   * Pneumococcal conjugate vaccine = PCV13 (Prevnar 13), PCV15 (Vaxneuvance), PCV20 (Prevnar 20)  * Pneumococcal polysaccharide vaccine = PPSV23 (Pneumovax) Adults 25-60 years old: 1-3 doses may be recommended based on certain risk factors  Adults 72 years old: 1-2 doses may be recommended based off what pneumonia vaccine you previously received   Hepatitis B Vaccine 3 dose series if at intermediate or high risk (ex: diabetes, end stage renal disease, liver disease)   Tetanus (Td) Vaccine - COST NOT COVERED BY MEDICARE PART B Following completion of primary series, a booster dose should be given every 10 years to maintain immunity against tetanus  Td may also be given as tetanus wound prophylaxis  Tdap Vaccine - COST NOT COVERED BY MEDICARE PART B Recommended at least once for all adults  For pregnant patients, recommended with each pregnancy  Shingles Vaccine (Shingrix) - COST NOT COVERED BY MEDICARE PART B  2 shot series recommended in those aged 48 and above     Health Maintenance Due:      Topic Date Due   • Hepatitis C Screening  Completed   • Colorectal Cancer Screening  Discontinued     Immunizations Due:      Topic Date Due   • COVID-19 Vaccine (4 - Booster for Moderna series) 02/01/2022     Advance Directives   What are advance directives? Advance directives are legal documents that state your wishes and plans for medical care  These plans are made ahead of time in case you lose your ability to make decisions for yourself  Advance directives can apply to any medical decision, such as the treatments you want, and if you want to donate organs  What are the types of advance directives? There are many types of advance directives, and each state has rules about how to use them  You may choose a combination of any of the following:  · Living will: This is a written record of the treatment you want  You can also choose which treatments you do not want, which to limit, and which to stop at a certain time  This includes surgery, medicine, IV fluid, and tube feedings  · Durable power of  for healthcare [facility]):   This is a written record that states who you want to make healthcare choices for you when you are unable to make them for yourself  This person, called a proxy, is usually a family member or a friend  You may choose more than 1 proxy  · Do not resuscitate (DNR) order:  A DNR order is used in case your heart stops beating or you stop breathing  It is a request not to have certain forms of treatment, such as CPR  A DNR order may be included in other types of advance directives  · Medical directive: This covers the care that you want if you are in a coma, near death, or unable to make decisions for yourself  You can list the treatments you want for each condition  Treatment may include pain medicine, surgery, blood transfusions, dialysis, IV or tube feedings, and a ventilator (breathing machine)  · Values history: This document has questions about your views, beliefs, and how you feel and think about life  This information can help others choose the care that you would choose  Why are advance directives important? An advance directive helps you control your care  Although spoken wishes may be used, it is better to have your wishes written down  Spoken wishes can be misunderstood, or not followed  Treatments may be given even if you do not want them  An advance directive may make it easier for your family to make difficult choices about your care  © Copyright [employer] 2018 Information is for End User's use only and may not be sold, redistributed or otherwise used for commercial purposes   All illustrations and images included in CareNotes® are the copyrighted property of [employer]  or [facility] "[employer]

## 2023-01-30 ENCOUNTER — TELEPHONE (OUTPATIENT)
Dept: FAMILY MEDICINE CLINIC | Facility: CLINIC | Age: 78
End: 2023-01-30

## 2023-01-30 DIAGNOSIS — R80.9 POSITIVE FOR MACROALBUMINURIA: ICD-10-CM

## 2023-01-30 DIAGNOSIS — N18.31 STAGE 3A CHRONIC KIDNEY DISEASE (HCC): Primary | ICD-10-CM

## 2023-01-30 NOTE — TELEPHONE ENCOUNTER
I called the patient regarding his lab results and he said that he did want to see a kidney specialist

## 2023-02-03 ENCOUNTER — TELEPHONE (OUTPATIENT)
Dept: NEPHROLOGY | Facility: CLINIC | Age: 78
End: 2023-02-03

## 2023-02-03 NOTE — TELEPHONE ENCOUNTER
Called patient to see if patient wanted to schedule a consult but patient requested we call back Monday  This is the first attempt

## 2023-02-06 NOTE — TELEPHONE ENCOUNTER
New Patient Intake Form   Patient Details   Shearon Head     1945     [id number]     Insurance Information   Name of [facility]     Does the patient need an insurance referral? yes   If patient has Babar Simpson, please ask if they will be using their Babar Simpson  Appointment Information   Who is calling to schedule? If not patient, what is callers name? Patient    Referring Provider Jeremy Valenzuela    Reason for Appt (Diagnosis) N18 31 (ICD-10-CM) - Stage 3a chronic kidney disease ([facility] [address] )  R80 9 (ICD-10-CM) - Positive for macroalbuminuria       Does Patient have labs/urine done at [place] [address]? If not, where do they go? List the date of last lab / urine hnl   Has patient been hospitalized recently? If yes, list name and location of hospital they were In [address] [facility] 12/27/22   Has patient been seen by a Nephrologist before? If yes, list name, location and phone number no   Has the patient had renal imaging done? If so, list the most recent date and type of imagineg Yes   Does patient have a history of Kidney Stones? -   Appointment Details   Is there a referral on file? yes   Appointment Date 04/24/23   Location [place]    Miscellaneous  Patient is going to call [place]

## 2023-02-08 ENCOUNTER — TELEPHONE (OUTPATIENT)
Dept: NEPHROLOGY | Facility: CLINIC | Age: 78
End: 2023-02-08

## 2023-02-08 NOTE — TELEPHONE ENCOUNTER
Received a voice mail from patient to cancel appointment  I returned call and patient did not want to reschedule   Patients appointment is cancelled

## 2023-03-13 ENCOUNTER — APPOINTMENT (OUTPATIENT)
Dept: LAB | Facility: CLINIC | Age: 78
End: 2023-03-13

## 2023-03-13 DIAGNOSIS — I50.20 SYSTOLIC HEART FAILURE, UNSPECIFIED HF CHRONICITY (HCC): ICD-10-CM

## 2023-03-13 LAB
ANION GAP SERPL CALCULATED.3IONS-SCNC: 5 MMOL/L (ref 4–13)
BUN SERPL-MCNC: 49 MG/DL (ref 5–25)
CALCIUM SERPL-MCNC: 10.1 MG/DL (ref 8.3–10.1)
CHLORIDE SERPL-SCNC: 105 MMOL/L (ref 96–108)
CO2 SERPL-SCNC: 25 MMOL/L (ref 21–32)
CREAT SERPL-MCNC: 1.57 MG/DL (ref 0.6–1.3)
GFR SERPL CREATININE-BSD FRML MDRD: 42 ML/MIN/1.73SQ M
GLUCOSE SERPL-MCNC: 130 MG/DL (ref 65–140)
POTASSIUM SERPL-SCNC: 4.4 MMOL/L (ref 3.5–5.3)
SODIUM SERPL-SCNC: 135 MMOL/L (ref 135–147)

## 2023-04-19 ENCOUNTER — APPOINTMENT (OUTPATIENT)
Dept: LAB | Facility: CLINIC | Age: 78
End: 2023-04-19

## 2023-04-19 DIAGNOSIS — I50.20 HFREF (HEART FAILURE WITH REDUCED EJECTION FRACTION) (HCC): ICD-10-CM

## 2023-04-19 LAB
ANION GAP SERPL CALCULATED.3IONS-SCNC: 4 MMOL/L (ref 4–13)
BUN SERPL-MCNC: 28 MG/DL (ref 5–25)
CALCIUM SERPL-MCNC: 9.9 MG/DL (ref 8.3–10.1)
CHLORIDE SERPL-SCNC: 101 MMOL/L (ref 96–108)
CO2 SERPL-SCNC: 27 MMOL/L (ref 21–32)
CREAT SERPL-MCNC: 1.58 MG/DL (ref 0.6–1.3)
GFR SERPL CREATININE-BSD FRML MDRD: 41 ML/MIN/1.73SQ M
GLUCOSE P FAST SERPL-MCNC: 161 MG/DL (ref 65–99)
POTASSIUM SERPL-SCNC: 4.5 MMOL/L (ref 3.5–5.3)
SODIUM SERPL-SCNC: 132 MMOL/L (ref 135–147)

## 2023-06-13 ENCOUNTER — OFFICE VISIT (OUTPATIENT)
Dept: FAMILY MEDICINE CLINIC | Facility: CLINIC | Age: 78
End: 2023-06-13
Payer: COMMERCIAL

## 2023-06-13 VITALS
TEMPERATURE: 97.4 F | BODY MASS INDEX: 21.48 KG/M2 | SYSTOLIC BLOOD PRESSURE: 134 MMHG | OXYGEN SATURATION: 98 % | DIASTOLIC BLOOD PRESSURE: 70 MMHG | HEART RATE: 70 BPM | WEIGHT: 121.2 LBS | HEIGHT: 63 IN

## 2023-06-13 DIAGNOSIS — K52.9 CHRONIC DIARRHEA: Primary | ICD-10-CM

## 2023-06-13 PROCEDURE — 99214 OFFICE O/P EST MOD 30 MIN: CPT | Performed by: FAMILY MEDICINE

## 2023-06-13 RX ORDER — SPIRONOLACTONE 25 MG/1
1 TABLET ORAL DAILY
COMMUNITY
Start: 2023-03-28

## 2023-06-13 RX ORDER — ASPIRIN 81 MG/1
81 TABLET ORAL
COMMUNITY
Start: 2022-12-21

## 2023-06-13 RX ORDER — HYDROXYZINE HYDROCHLORIDE 25 MG/1
50 TABLET, FILM COATED ORAL
COMMUNITY
Start: 2023-05-19

## 2023-06-13 NOTE — PROGRESS NOTES
Assessment/Plan:    No problem-specific Assessment & Plan notes found for this encounter  Diagnoses and all orders for this visit:    Chronic diarrhea  -     CBC and differential; Future  -     Comprehensive metabolic panel; Future  -     Sedimentation rate, automated; Future  -     Calprotectin,Fecal; Future  -     Stool culture; Future  -     White Blood Cells, Stool by Gram Stain; Future  -     Clostridium difficile toxin by PCR; Future  -     Giardia antigen; Future  -     Ova and parasite examination; Future  -     Celiac Disease Panel; Future    Other orders  -     aspirin (ECOTRIN LOW STRENGTH) 81 mg EC tablet; 81 mg  -     dextran 70-hypromellose (GENTEAL TEARS) 0 1-0 3 % ophthalmic solution; as needed  -     Diclofenac Sodium (VOLTAREN) 1 %; APPLY 4GM TO LOWER EXTREMITIES TOPICALLY TWICE A DAY **USE DOSING CARD** (DO NOT EXCEED 16 GRAMS DAILY TO ANY AFFECTED JOINT OF THE LOWER EXTREMITIES  DO NOT EXCEED 8 GRAMS DAILY TO ANY AFFECTED JOINT OF THE UPPER EXTREMITIES  DO NOT EXCEED A TOTAL DOSE OF 32 GRAMS DAILY OVER ALL JOINTS)  -     hydrOXYzine HCL (ATARAX) 25 mg tablet; 50 mg  -     Empagliflozin 25 MG TABS; 12 5 mg  -     spironolactone (ALDACTONE) 25 mg tablet; Take 1 tablet by mouth daily        Patient presenting with 6 month history of diarrhea  He denies any abdominal pain, rectal bleeding, nocturnal pain or diarrhea but has lost 7lbs since our last visit in January which was unintentional  Differentials include IBD, IBS, malabsorption syndrome (e g  lactose intolerance, celiac disease), malignancy  He is also on metformin which can cause diarrhea  At this time will order lab work to include CBC, CMP and ESR as well as stool studies to include fecal calprotectin  Patient also advised to keep a food diary and eliminate dairy and gluten to see if that will have effect  Patient declined follow up visit in 2-4 weeks but will call with a status update  If no improvement refer to gastroenterology  "    Subjective:      Patient ID: Mason Madera is a 69 y o  male  Diarrhea   This is a chronic problem  Episode onset: 6 months  The problem occurs 2 to 4 times per day  The problem has been unchanged  The patient states that diarrhea does not awaken him from sleep  Associated symptoms include weight loss  Pertinent negatives include no abdominal pain, bloating, chills, fever, increased  flatus, sweats or vomiting  There are no known risk factors  He has tried anti-motility drug for the symptoms  The treatment provided mild relief  There is no history of inflammatory bowel disease, irritable bowel syndrome or a recent abdominal surgery  Patient has not noticed a relation to food but reports that he does eat a lot of dairy and pasta typically  He does have a family history of colon cancer in his son who passed away and he thinks maybe his mother had colon cancer  The following portions of the patient's history were reviewed and updated as appropriate: allergies, current medications, past family history, past medical history, past social history, past surgical history and problem list     Review of Systems   Constitutional: Positive for weight loss  Negative for chills and fever  HENT: Negative  Respiratory: Negative  Cardiovascular: Negative  Gastrointestinal: Positive for diarrhea  Negative for abdominal distention, abdominal pain, bloating, blood in stool, constipation, flatus, nausea and vomiting  Genitourinary: Negative  Musculoskeletal: Negative  Skin: Negative  Neurological: Negative  Psychiatric/Behavioral: Negative  Objective:      /70 (BP Location: Left arm, Patient Position: Sitting, Cuff Size: Standard)   Pulse 70   Temp (!) 97 4 °F (36 3 °C) (Temporal)   Ht 5' 3 39\" (1 61 m)   Wt 55 kg (121 lb 3 2 oz)   SpO2 98%   BMI 21 21 kg/m²          Physical Exam  Constitutional:       General: He is not in acute distress       Appearance: He is not " ill-appearing  HENT:      Head: Normocephalic and atraumatic  Eyes:      General:         Right eye: No discharge  Left eye: No discharge  Extraocular Movements: Extraocular movements intact  Cardiovascular:      Rate and Rhythm: Normal rate  Pulmonary:      Effort: Pulmonary effort is normal  No respiratory distress  Breath sounds: No wheezing  Abdominal:      General: Bowel sounds are normal  There is no distension  Palpations: Abdomen is soft  Tenderness: There is no abdominal tenderness  There is no guarding  Neurological:      General: No focal deficit present  Mental Status: He is alert     Psychiatric:         Mood and Affect: Mood normal          Behavior: Behavior normal

## 2023-06-13 NOTE — PATIENT INSTRUCTIONS
- Please get the lab work and stool testing completed   I will be in touch with the results   - Please keep a food diary to help identify any specific triggers, cut out gluten and dairy   - Please update me in 2-4 weeks

## 2023-06-14 ENCOUNTER — TELEPHONE (OUTPATIENT)
Dept: FAMILY MEDICINE CLINIC | Facility: CLINIC | Age: 78
End: 2023-06-14

## 2023-06-14 ENCOUNTER — APPOINTMENT (OUTPATIENT)
Dept: LAB | Facility: CLINIC | Age: 78
End: 2023-06-14
Payer: COMMERCIAL

## 2023-06-14 DIAGNOSIS — K52.9 CHRONIC DIARRHEA: ICD-10-CM

## 2023-06-14 PROCEDURE — 87329 GIARDIA AG IA: CPT

## 2023-06-14 PROCEDURE — 87209 SMEAR COMPLEX STAIN: CPT

## 2023-06-14 PROCEDURE — 87177 OVA AND PARASITES SMEARS: CPT

## 2023-06-14 NOTE — TELEPHONE ENCOUNTER
Patient called stating that he went to lab to  all the equipment for the stool sample and he said he can't do it  There's too much and many tubes to scoop stool with a spoon? Is there anything else that he can do?

## 2023-06-16 ENCOUNTER — APPOINTMENT (OUTPATIENT)
Dept: LAB | Facility: CLINIC | Age: 78
End: 2023-06-16
Payer: COMMERCIAL

## 2023-06-16 DIAGNOSIS — K52.9 CHRONIC DIARRHEA: ICD-10-CM

## 2023-06-16 LAB — G LAMBLIA AG STL QL IA: NEGATIVE

## 2023-06-16 PROCEDURE — 87493 C DIFF AMPLIFIED PROBE: CPT

## 2023-06-16 PROCEDURE — 83993 ASSAY FOR CALPROTECTIN FECAL: CPT

## 2023-06-16 PROCEDURE — 87205 SMEAR GRAM STAIN: CPT

## 2023-06-17 LAB
C DIFF TOX GENS STL QL NAA+PROBE: NEGATIVE
WBC STL QL MICRO: NORMAL

## 2023-06-20 ENCOUNTER — TELEPHONE (OUTPATIENT)
Dept: FAMILY MEDICINE CLINIC | Facility: CLINIC | Age: 78
End: 2023-06-20

## 2023-06-20 ENCOUNTER — APPOINTMENT (OUTPATIENT)
Dept: LAB | Facility: CLINIC | Age: 78
End: 2023-06-20
Payer: COMMERCIAL

## 2023-06-20 DIAGNOSIS — K52.9 CHRONIC DIARRHEA OF UNKNOWN ORIGIN: ICD-10-CM

## 2023-06-20 PROCEDURE — 87505 NFCT AGENT DETECTION GI: CPT

## 2023-06-20 NOTE — TELEPHONE ENCOUNTER
----- Message from Augustine Alvarez MD sent at 6/20/2023 11:47 AM EDT -----  Stool testing so far negative, just awaiting one more stool test  Please ask patient to get lab testing as well  Any improvement in the diarrhea since cutting out dairy? He can also try temporarily stopping metformin to see if that helps

## 2023-06-20 NOTE — TELEPHONE ENCOUNTER
I spoke to patient and advised and he states that now he is not as bad as he was, he states that he has not taken metformin for a while  Are you ok to remove from his medication list? He will get the other testing done today

## 2023-06-21 LAB
CAMPYLOBACTER DNA SPEC NAA+PROBE: NORMAL
SALMONELLA DNA SPEC QL NAA+PROBE: NORMAL
SHIGA TOXIN STX GENE SPEC NAA+PROBE: NORMAL
SHIGELLA DNA SPEC QL NAA+PROBE: NORMAL

## 2023-06-24 LAB — CALPROTECTIN STL-MCNT: 5 UG/G (ref 0–120)

## 2023-06-28 ENCOUNTER — LAB (OUTPATIENT)
Dept: LAB | Facility: CLINIC | Age: 78
End: 2023-06-28
Payer: COMMERCIAL

## 2023-06-28 DIAGNOSIS — K52.9 CHRONIC DIARRHEA: Primary | ICD-10-CM

## 2023-06-28 DIAGNOSIS — R63.4 WEIGHT LOSS: ICD-10-CM

## 2023-06-28 DIAGNOSIS — K52.9 CHRONIC DIARRHEA: ICD-10-CM

## 2023-06-28 LAB
ALBUMIN SERPL BCP-MCNC: 4.1 G/DL (ref 3.5–5)
ALP SERPL-CCNC: 60 U/L (ref 46–116)
ALT SERPL W P-5'-P-CCNC: 24 U/L (ref 12–78)
ANION GAP SERPL CALCULATED.3IONS-SCNC: 8 MMOL/L
AST SERPL W P-5'-P-CCNC: 12 U/L (ref 5–45)
BASOPHILS # BLD AUTO: 0.04 THOUSANDS/ÂΜL (ref 0–0.1)
BASOPHILS NFR BLD AUTO: 1 % (ref 0–1)
BILIRUB SERPL-MCNC: 0.54 MG/DL (ref 0.2–1)
BUN SERPL-MCNC: 40 MG/DL (ref 5–25)
CALCIUM SERPL-MCNC: 9.5 MG/DL (ref 8.3–10.1)
CHLORIDE SERPL-SCNC: 105 MMOL/L (ref 96–108)
CO2 SERPL-SCNC: 23 MMOL/L (ref 21–32)
CREAT SERPL-MCNC: 1.59 MG/DL (ref 0.6–1.3)
EOSINOPHIL # BLD AUTO: 0.22 THOUSAND/ÂΜL (ref 0–0.61)
EOSINOPHIL NFR BLD AUTO: 3 % (ref 0–6)
ERYTHROCYTE [DISTWIDTH] IN BLOOD BY AUTOMATED COUNT: 14.1 % (ref 11.6–15.1)
ERYTHROCYTE [SEDIMENTATION RATE] IN BLOOD: 12 MM/HOUR (ref 0–19)
GFR SERPL CREATININE-BSD FRML MDRD: 41 ML/MIN/1.73SQ M
GLUCOSE P FAST SERPL-MCNC: 129 MG/DL (ref 65–99)
HCT VFR BLD AUTO: 46.8 % (ref 36.5–49.3)
HGB BLD-MCNC: 15.5 G/DL (ref 12–17)
IMM GRANULOCYTES # BLD AUTO: 0.02 THOUSAND/UL (ref 0–0.2)
IMM GRANULOCYTES NFR BLD AUTO: 0 % (ref 0–2)
LYMPHOCYTES # BLD AUTO: 3.08 THOUSANDS/ÂΜL (ref 0.6–4.47)
LYMPHOCYTES NFR BLD AUTO: 36 % (ref 14–44)
MCH RBC QN AUTO: 31.1 PG (ref 26.8–34.3)
MCHC RBC AUTO-ENTMCNC: 33.1 G/DL (ref 31.4–37.4)
MCV RBC AUTO: 94 FL (ref 82–98)
MONOCYTES # BLD AUTO: 0.84 THOUSAND/ÂΜL (ref 0.17–1.22)
MONOCYTES NFR BLD AUTO: 10 % (ref 4–12)
NEUTROPHILS # BLD AUTO: 4.43 THOUSANDS/ÂΜL (ref 1.85–7.62)
NEUTS SEG NFR BLD AUTO: 50 % (ref 43–75)
NRBC BLD AUTO-RTO: 0 /100 WBCS
PLATELET # BLD AUTO: 250 THOUSANDS/UL (ref 149–390)
PMV BLD AUTO: 9.9 FL (ref 8.9–12.7)
POTASSIUM SERPL-SCNC: 5.2 MMOL/L (ref 3.5–5.3)
PROT SERPL-MCNC: 7.8 G/DL (ref 6.4–8.4)
RBC # BLD AUTO: 4.98 MILLION/UL (ref 3.88–5.62)
SODIUM SERPL-SCNC: 136 MMOL/L (ref 135–147)
WBC # BLD AUTO: 8.63 THOUSAND/UL (ref 4.31–10.16)

## 2023-06-28 PROCEDURE — 86364 TISS TRNSGLTMNASE EA IG CLAS: CPT

## 2023-06-28 PROCEDURE — 36415 COLL VENOUS BLD VENIPUNCTURE: CPT

## 2023-06-28 PROCEDURE — 80053 COMPREHEN METABOLIC PANEL: CPT

## 2023-06-28 PROCEDURE — 86258 DGP ANTIBODY EACH IG CLASS: CPT

## 2023-06-28 PROCEDURE — 82784 ASSAY IGA/IGD/IGG/IGM EACH: CPT

## 2023-06-28 PROCEDURE — 86231 EMA EACH IG CLASS: CPT

## 2023-06-28 PROCEDURE — 85652 RBC SED RATE AUTOMATED: CPT

## 2023-06-28 PROCEDURE — 85025 COMPLETE CBC W/AUTO DIFF WBC: CPT

## 2023-06-30 LAB
ENDOMYSIUM IGA SER QL: NEGATIVE
GLIADIN PEPTIDE IGA SER-ACNC: 5 UNITS (ref 0–19)
GLIADIN PEPTIDE IGG SER-ACNC: 3 UNITS (ref 0–19)
IGA SERPL-MCNC: 300 MG/DL (ref 61–437)
TTG IGA SER-ACNC: 2 U/ML (ref 0–3)
TTG IGG SER-ACNC: 4 U/ML (ref 0–5)

## 2023-07-07 ENCOUNTER — TELEPHONE (OUTPATIENT)
Dept: GASTROENTEROLOGY | Facility: CLINIC | Age: 78
End: 2023-07-07

## 2023-07-07 ENCOUNTER — TRANSCRIBE ORDERS (OUTPATIENT)
Dept: GASTROENTEROLOGY | Facility: CLINIC | Age: 78
End: 2023-07-07

## 2023-07-07 NOTE — TELEPHONE ENCOUNTER
Called patient to schedule appointment. He has seen Dr. Casas Failing in the past and will call back to schedule.     [place] Auth: [id number]  R19.7 (ICD-10-CM) - Diarrhea

## 2023-07-19 ENCOUNTER — OFFICE VISIT (OUTPATIENT)
Dept: GASTROENTEROLOGY | Facility: MEDICAL CENTER | Age: 78
End: 2023-07-19
Payer: COMMERCIAL

## 2023-07-19 VITALS
SYSTOLIC BLOOD PRESSURE: 163 MMHG | DIASTOLIC BLOOD PRESSURE: 81 MMHG | WEIGHT: 121 LBS | TEMPERATURE: 97.8 F | BODY MASS INDEX: 21.17 KG/M2 | HEART RATE: 68 BPM

## 2023-07-19 DIAGNOSIS — K52.9 CHRONIC DIARRHEA: ICD-10-CM

## 2023-07-19 DIAGNOSIS — R63.4 WEIGHT LOSS: ICD-10-CM

## 2023-07-19 DIAGNOSIS — R15.9 FULL INCONTINENCE OF FECES: Primary | ICD-10-CM

## 2023-07-19 PROCEDURE — 99204 OFFICE O/P NEW MOD 45 MIN: CPT | Performed by: INTERNAL MEDICINE

## 2023-07-19 NOTE — PROGRESS NOTES
[facility] - Outpatient Consultation  Trista Stanton 69 y.o. male MRN: [id number]  Encounter: [id number]          ASSESSMENT AND PLAN:      1. Chronic diarrhea  Patient has history of IBS-D. He has regular bowel movement. He denies nocturnal symptoms. His last colonoscopy was in 2019 at [facility] for screening purposes with normal findings and some hemorrhoids. He denies NSAIDs use. - CT abdomen pelvis w contrast; Future    2. Weight loss  Patient has unintentional weight loss of 8 to 10 pounds according to record. CT scan to rule out occult malignancies. - CT abdomen pelvis w contrast; Future    3. Full incontinence of feces  Patient's chief complaint is incontinence. Plan to refer to pelvic physical therapy with biofeedback to improve incontinence. Patient is aware to use Imodium prior to any planned activity to prevent incontinence accident. - Ambulatory Referral to Physical Therapy; Future  Follow-up after CT scan in 3 months.  ______________________________________________________________________    HPI: 61-year-old man with history of IBS referred for unintentional weight loss and chronic diarrhea. Patient reported he has regular bowel movement twice a day, stool has been soft. His chief complaint is frequent incontinence in the past few months. He underwent multiple stool test including celiac panel and infectious causes which were all negative. His stool white cell and inflammation marker were also negative. He reported he had unintentional weight loss but could not specify the amount. Patient is not a good historian. He reported he regularly weighs around 130. Today in the office he weighs at 192 pounds. REVIEW OF SYSTEMS:    CONSTITUTIONAL: Denies any fever, chills, rigors, and weight loss. HEENT: No earache or tinnitus. Denies hearing loss or visual disturbances. CARDIOVASCULAR: No chest pain or palpitations.    RESPIRATORY: Denies any cough, hemoptysis, shortness of breath or dyspnea on exertion. GASTROINTESTINAL: As noted in the History of Present Illness. GENITOURINARY: No problems with urination. Denies any hematuria or dysuria. NEUROLOGIC: No dizziness or vertigo, denies headaches. MUSCULOSKELETAL: Denies any muscle or joint pain. SKIN: Denies skin rashes or itching. ENDOCRINE: Denies excessive thirst. Denies intolerance to heat or cold. PSYCHOSOCIAL: Denies depression or anxiety. Denies any recent memory loss.        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/2019   • Ureter, calculus    • Urinary stone      Past Surgical History:   Procedure Laterality Date   • CARDIAC SURGERY     • CHOLECYSTECTOMY     • CYSTOSCOPY W/ URETEROSCOPY       Social History   Social History     Substance and Sexual Activity   Alcohol Use Yes   • Alcohol/week: 21.0 standard drinks of alcohol   • Types: 14 Cans of beer, 7 Shots of liquor per week    Comment: occ     Social History     Substance and Sexual Activity   Drug Use No     Social History     Tobacco Use   Smoking Status Never   Smokeless Tobacco Never     Family History   Problem Relation Age of Onset   • Cancer Mother    • Arthritis Father    • Heart disease Father         cardiac disorder   • Cancer Family    • Arthritis Family        Meds/Allergies       Current Outpatient Medications:   •  Alogliptin Benzoate 25 MG TABS  •  aspirin (ECOTRIN LOW STRENGTH) 81 mg EC tablet  •  carbidopa-levodopa (SINEMET CR)  mg TBCR per ER tablet  •  clotrimazole-betamethasone (LOTRISONE) 1-0.05 % cream  •  dextran 70-hypromellose (GENTEAL TEARS) 0.1-0.3 % ophthalmic solution  •  Diclofenac Sodium (VOLTAREN) 1 %  •  Empagliflozin 25 MG TABS  •  Entresto 24-26 MG TABS  • erythromycin (ILOTYCIN) ophthalmic ointment  •  fluticasone (FLONASE) 50 mcg/act nasal spray  •  furosemide (LASIX) 40 mg tablet  •  glucose blood test strip  •  hydrOXYzine HCL (ATARAX) 25 mg tablet  •  metoprolol succinate (TOPROL-XL) 50 mg 24 hr tablet  •  mirtazapine (REMERON) 7.5 MG tablet  •  Multiple Vitamin (MULTIVITAMINS PO)  •  QUEtiapine (SEROquel) 50 mg tablet  •  simvastatin (ZOCOR) 80 mg tablet  •  spironolactone (ALDACTONE) 25 mg tablet  •  traMADol (Ultram) 50 mg tablet    Allergies   Allergen Reactions   • Ketotifen Diarrhea and Other (See Comments)   • Metformin Diarrhea           Objective     Blood pressure 163/81, pulse 68, temperature 97.8 °F (36.6 °C), weight 54.9 kg (121 lb). Body mass index is 21.17 kg/m². PHYSICAL EXAM:      General Appearance:   Alert, cooperative, no distress   HEENT:   Normocephalic, atraumatic, anicteric.     Neck:  Supple, symmetrical, trachea midline   Lungs:   Clear to auscultation bilaterally; no rales, rhonchi or wheezing; respirations unlabored    Heart[de-identified]   Regular rate and rhythm; no murmur, rub, or gallop. Abdomen:   Soft, non-tender, non-distended; normal bowel sounds; no masses, no organomegaly    Genitalia:   Deferred    Rectal:   Deferred    Extremities:  No cyanosis, clubbing or edema    Pulses:  2+ and symmetric    Skin:  No jaundice, rashes, or lesions    Lymph nodes:  No palpable cervical lymphadenopathy        Lab Results:   No visits with results within 1 Day(s) from this visit.    Latest known visit with results is:   Lab on 06/28/2023   Component Date Value   • WBC 06/28/2023 8.63    • RBC 06/28/2023 4.98    • Hemoglobin 06/28/2023 15.5    • Hematocrit 06/28/2023 46.8    • MCV 06/28/2023 94    • MCH 06/28/2023 31.1    • MCHC 06/28/2023 33.1    • RDW 06/28/2023 14.1    • MPV 06/28/2023 9.9    • Platelets 77/08/2303 250    • nRBC 06/28/2023 0    • Neutrophils Relative 06/28/2023 50    • Immat GRANS % 06/28/2023 0    • Lymphocytes Relative 06/28/2023 36    • Monocytes Relative 06/28/2023 10    • Eosinophils Relative 06/28/2023 3    • Basophils Relative 06/28/2023 1    • Neutrophils Absolute 06/28/2023 4.43    • Immature Grans Absolute 06/28/2023 0.02    • Lymphocytes Absolute 06/28/2023 3.08    • Monocytes Absolute 06/28/2023 0.84    • Eosinophils Absolute 06/28/2023 0.22    • Basophils Absolute 06/28/2023 0.04    • Sodium 06/28/2023 136    • Potassium 06/28/2023 5.2    • Chloride 06/28/2023 105    • CO2 06/28/2023 23    • ANION GAP 06/28/2023 8    • BUN 06/28/2023 40 (H)    • Creatinine 06/28/2023 1.59 (H)    • Glucose, Fasting 06/28/2023 129 (H)    • Calcium 06/28/2023 9.5    • AST 06/28/2023 12    • ALT 06/28/2023 24    • Alkaline Phosphatase 06/28/2023 60    • Total Protein 06/28/2023 7.8    • Albumin 06/28/2023 4.1    • Total Bilirubin 06/28/2023 0.54    • eGFR 06/28/2023 41    • Sed Rate 06/28/2023 12    • IgA 06/28/2023 300    • Gliadin IgA 06/28/2023 5    • Gliadin IgG 06/28/2023 3    • Tissue Transglut Ab IGG 06/28/2023 4    • TISSUE TRANSGLUTAMINASE * 06/28/2023 2    • Endomysial IgA 06/28/2023 Negative          Radiology Results:   No results found.

## 2023-07-19 NOTE — LETTER
July 25, 2023     Shalini Shannon MD  [address] [place] [address]    Patient: Trista Stanton   YOB: 1945   Date of Visit: 7/19/2023       Dear Dr. Smith Player: Thank you for referring Annalise Mosher to me for evaluation. Below are my notes for this consultation. If you have questions, please do not hesitate to call me. I look forward to following your patient along with you. Sincerely,        Radha Kim MD        CC: No Recipients    Radha Kim MD  7/19/2023  5:43 PM  Signed  [facility] - Outpatient Consultation  Trista Stanton 69 y.o. male MRN: [id number]  Encounter: [id number]          ASSESSMENT AND PLAN:      1. Chronic diarrhea  Patient has history of IBS-D. He has regular bowel movement. He denies nocturnal symptoms. His last colonoscopy was in 2019 at [facility] for screening purposes with normal findings and some hemorrhoids. He denies NSAIDs use. - CT abdomen pelvis w contrast; Future    2. Weight loss  Patient has unintentional weight loss of 8 to 10 pounds according to record. CT scan to rule out occult malignancies. - CT abdomen pelvis w contrast; Future    3. Full incontinence of feces  Patient's chief complaint is incontinence. Plan to refer to pelvic physical therapy with biofeedback to improve incontinence. Patient is aware to use Imodium prior to any planned activity to prevent incontinence accident. - Ambulatory Referral to Physical Therapy; Future  Follow-up after CT scan in 3 months.  ______________________________________________________________________    HPI: 81-year-old man with history of IBS referred for unintentional weight loss and chronic diarrhea. Patient reported he has regular bowel movement twice a day, stool has been soft. His chief complaint is frequent incontinence in the past few months.   He underwent multiple stool test including celiac panel and infectious causes which were all negative. His stool white cell and inflammation marker were also negative. He reported he had unintentional weight loss but could not specify the amount. Patient is not a good historian. He reported he regularly weighs around 130. Today in the office he weighs at 192 pounds. REVIEW OF SYSTEMS:    CONSTITUTIONAL: Denies any fever, chills, rigors, and weight loss. HEENT: No earache or tinnitus. Denies hearing loss or visual disturbances. CARDIOVASCULAR: No chest pain or palpitations. RESPIRATORY: Denies any cough, hemoptysis, shortness of breath or dyspnea on exertion. GASTROINTESTINAL: As noted in the History of Present Illness. GENITOURINARY: No problems with urination. Denies any hematuria or dysuria. NEUROLOGIC: No dizziness or vertigo, denies headaches. MUSCULOSKELETAL: Denies any muscle or joint pain. SKIN: Denies skin rashes or itching. ENDOCRINE: Denies excessive thirst. Denies intolerance to heat or cold. PSYCHOSOCIAL: Denies depression or anxiety. Denies any recent memory loss.        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/2019   • Ureter, calculus    • Urinary stone      Past Surgical History:   Procedure Laterality Date   • CARDIAC SURGERY     • CHOLECYSTECTOMY     • CYSTOSCOPY W/ URETEROSCOPY       Social History   Social History     Substance and Sexual Activity   Alcohol Use Yes   • Alcohol/week: 21.0 standard drinks of alcohol   • Types: 14 Cans of beer, 7 Shots of liquor per week    Comment: occ     Social History     Substance and Sexual Activity   Drug Use No     Social History     Tobacco Use   Smoking Status Never   Smokeless Tobacco Never     Family History   Problem Relation Age of Onset   • Cancer Mother    • Arthritis Father    • Heart disease Father         cardiac disorder   • Cancer Family    • Arthritis Family        Meds/Allergies       Current Outpatient Medications:   •  Alogliptin Benzoate 25 MG TABS  •  aspirin (ECOTRIN LOW STRENGTH) 81 mg EC tablet  •  carbidopa-levodopa (SINEMET CR)  mg TBCR per ER tablet  •  clotrimazole-betamethasone (LOTRISONE) 1-0.05 % cream  •  dextran 70-hypromellose (GENTEAL TEARS) 0.1-0.3 % ophthalmic solution  •  Diclofenac Sodium (VOLTAREN) 1 %  •  Empagliflozin 25 MG TABS  •  Entresto 24-26 MG TABS  •  erythromycin (ILOTYCIN) ophthalmic ointment  •  fluticasone (FLONASE) 50 mcg/act nasal spray  •  furosemide (LASIX) 40 mg tablet  •  glucose blood test strip  •  hydrOXYzine HCL (ATARAX) 25 mg tablet  •  metoprolol succinate (TOPROL-XL) 50 mg 24 hr tablet  •  mirtazapine (REMERON) 7.5 MG tablet  •  Multiple Vitamin (MULTIVITAMINS PO)  •  QUEtiapine (SEROquel) 50 mg tablet  •  simvastatin (ZOCOR) 80 mg tablet  •  spironolactone (ALDACTONE) 25 mg tablet  •  traMADol (Ultram) 50 mg tablet    Allergies   Allergen Reactions   • Ketotifen Diarrhea and Other (See Comments)   • Metformin Diarrhea           Objective     Blood pressure 163/81, pulse 68, temperature 97.8 °F (36.6 °C), weight 54.9 kg (121 lb). Body mass index is 21.17 kg/m². PHYSICAL EXAM:      General Appearance:   Alert, cooperative, no distress   HEENT:   Normocephalic, atraumatic, anicteric. Neck:  Supple, symmetrical, trachea midline   Lungs:   Clear to auscultation bilaterally; no rales, rhonchi or wheezing; respirations unlabored    Heart[de-identified]   Regular rate and rhythm; no murmur, rub, or gallop.    Abdomen:   Soft, non-tender, non-distended; normal bowel sounds; no masses, no organomegaly    Genitalia:   Deferred    Rectal:   Deferred    Extremities:  No cyanosis, clubbing or edema    Pulses:  2+ and symmetric    Skin:  No jaundice, rashes, or lesions    Lymph nodes:  No palpable cervical lymphadenopathy        Lab Results:   No visits with results within 1 Day(s) from this visit. Latest known visit with results is:   Lab on 06/28/2023   Component Date Value   • WBC 06/28/2023 8.63    • RBC 06/28/2023 4.98    • Hemoglobin 06/28/2023 15.5    • Hematocrit 06/28/2023 46.8    • MCV 06/28/2023 94    • MCH 06/28/2023 31.1    • MCHC 06/28/2023 33.1    • RDW 06/28/2023 14.1    • MPV 06/28/2023 9.9    • Platelets 44/92/5251 250    • nRBC 06/28/2023 0    • Neutrophils Relative 06/28/2023 50    • Immat GRANS % 06/28/2023 0    • Lymphocytes Relative 06/28/2023 36    • Monocytes Relative 06/28/2023 10    • Eosinophils Relative 06/28/2023 3    • Basophils Relative 06/28/2023 1    • Neutrophils Absolute 06/28/2023 4.43    • Immature Grans Absolute 06/28/2023 0.02    • Lymphocytes Absolute 06/28/2023 3.08    • Monocytes Absolute 06/28/2023 0.84    • Eosinophils Absolute 06/28/2023 0.22    • Basophils Absolute 06/28/2023 0.04    • Sodium 06/28/2023 136    • Potassium 06/28/2023 5.2    • Chloride 06/28/2023 105    • CO2 06/28/2023 23    • ANION GAP 06/28/2023 8    • BUN 06/28/2023 40 (H)    • Creatinine 06/28/2023 1.59 (H)    • Glucose, Fasting 06/28/2023 129 (H)    • Calcium 06/28/2023 9.5    • AST 06/28/2023 12    • ALT 06/28/2023 24    • Alkaline Phosphatase 06/28/2023 60    • Total Protein 06/28/2023 7.8    • Albumin 06/28/2023 4.1    • Total Bilirubin 06/28/2023 0.54    • eGFR 06/28/2023 41    • Sed Rate 06/28/2023 12    • IgA 06/28/2023 300    • Gliadin IgA 06/28/2023 5    • Gliadin IgG 06/28/2023 3    • Tissue Transglut Ab IGG 06/28/2023 4    • TISSUE TRANSGLUTAMINASE * 06/28/2023 2    • Endomysial IgA 06/28/2023 Negative          Radiology Results:   No results found.

## 2023-07-24 ENCOUNTER — NURSE TRIAGE (OUTPATIENT)
Age: 78
End: 2023-07-24

## 2023-07-24 NOTE — TELEPHONE ENCOUNTER
Patient calling in, he was referred for pelvic PT and wanted to number to call to schedule appt. I provided patient with PT number, no further questions.

## 2023-07-28 NOTE — PROGRESS NOTES
PT Evaluation     Today's date: 2023  Patient name: Trista Stanton  :   MRN: [id number]  Referring provider: Radha Kim MD  Dx:   Encounter Diagnosis     ICD-10-CM    1. Full incontinence of feces  R15.9 Ambulatory Referral to Physical Therapy          Start Time: 1400  Stop Time: 1435  Total time in clinic (min): 35 minutes    Assessment  Assessment details: Rosie Aguilar is a 69y.o. year old male with complaints of diarrhea and fecal incontinence. The following impairments were found on evaluation: poor report of history of symptoms, and poor functional movements. These impairments contribute to the following functional limitations: fecal incontinence; and the following disability: decreased quality of life and increased risk of infection. Patient is unsure if he would like to participate in pelvic floor physical therapy. Provided him with eduation on what PFPT is, how we evaluate the muscles, importance of identifying triggers for diarrhea and ways to prevent this from occurring. Pt is unsure if he would like to participate in this. Provided him with food and bowel diaries and he will call if he wishes to set up treatments. Rosie Aguilar  is a fair candidate for therapy, as he has poor reporting of symptoms and poor ability to answer many history questions and would benefit from skilled physical therapy to address the above impairments in order to allow the patient to achieve below goals and return to his prior level of function. Patient education provided on PFM anatomy and function, bowel and food diary . Patient educated on diagnosis, plan of care and prognosis. Felipe Carvajal are in agreement with recommended plan of care, goals for therapy and demonstrates motivation for active participation in proposed plan of care.     Thank you Dr. Lieutenant Ramirez for this referral!    Impairments: abnormal coordination, abnormal gait, abnormal muscle firing, abnormal muscle tone, abnormal or restricted ROM, activity intolerance, impaired physical strength, lacks appropriate home exercise program, poor posture  and poor body mechanics  Barriers to therapy: none  Understanding of Dx/Px/POC: good   Prognosis: good    Goals  STG to be completed in 8 weeks from 8/1/2023:  1. Juan Manuel Isai will be independent with initial home exercise program.   2. Juan Manuel Isai will demonstrate ability to contract, relax and actively lengthen PFM. 3. Juan Manuel Isai will complete food and bowel diary to assist with making life-style changes to improve bowel symptoms. 4. Juan Manuel Isai will participate in internal PFM exam to determine if PFM weakness is a contributing factor to FI. LTG to be completed in 12 weeks from 8/1/2023 or upon discharge from PFPT:  1. Juan Manuel Isai will be independent with advanced home exercise program for self-management of symptoms. 2. Juan Manuel Isai will demonstrate ability to appropriately activate deep core muscles with functional mobility tasks. 3. Juan Manuel Isai will report having FI 1 time per month or less. 4. Juan Manuel Isai will report bowel movements [place] type 3-5 75% of the time or more to indicate improved bowel function.            Plan  Patient would benefit from: skilled physical therapy  Planned modality interventions: biofeedback, cryotherapy, TENS, thermotherapy: hydrocollator packs, traction and ultrasound  Planned therapy interventions: abdominal trunk stabilization, activity modification, ADL retraining, ADL training, balance, balance/weight bearing training, behavior modification, body mechanics training, breathing training, coordination, fine motor coordination training, flexibility, functional ROM exercises, gait training, graded activity, graded exercise, graded motor, home exercise program, IADL retraining, IASTM, joint mobilization, kinesiology taping, manual therapy, massage, muscle pump exercises, nerve gliding, neuromuscular re-education, patient education, postural training, strengthening, stretching, therapeutic activities and therapeutic exercise  Frequency: 1x week  Duration in weeks: 12  Plan of Care beginning date: 8/1/2023  Plan of Care expiration date: 10/24/2023  Treatment plan discussed with: patient, PTA and referring physician        PT Pelvic Floor Subjective:   History of Present Illness:   Rossy Sue is a 69 y.o. male who prefers he/him pronouns. They present to pelvic floor physical therapy with the primary complaint of fecal incontinence. They are referred to OPPT by Dr. Anthony Velasquez. Demetrius Kelly reports over the last year he has been having a lot of diarrhea. He has not had any diarrhea this week. He does report he has FI about 1 time per week. He reports full loss of continence when this occurs. He does not wear any briefs but does need to change his clothes. Per Dr. Anthony Velasquez (gastroenterology note):   "50-year-old man with history of IBS referred for unintentional weight loss and chronic diarrhea. Patient reported he has regular bowel movement twice a day, stool has been soft. His chief complaint is frequent incontinence in the past few months. He underwent multiple stool test including celiac panel and infectious causes which were all negative. His stool white cell and inflammation marker were also negative. He reported he had unintentional weight loss but could not specify the amount. Patient is not a good historian. He reported he regularly weighs around 130. Today in the office he weighs at 192 pounds. Social Support:     Lives in:  Multiple-level home (no issues on the stairs )    Lives with: tenant- will help out sometimes.     Relationship status: never     Work status: retired (worked at [employer] )    Life stress severity: moderate (just let the stress pass )  Hand dominance:  Right  Diet and Exercise:    Diet:vegetarian and vegan    Exercise frequency: daily    Does sit ups, cervical ROM and lifts weights with 5# weights  Sometimes will walk up the road 10 min   Co-morbidities: Patient Active Problem List:     Anemia     Anxiety     Back pain     Coronary atherosclerosis     Diverticulosis of colon     BPH with urinary obstruction     Essential hypertension     Fracture of C1 vertebra, closed ([facility])     Glenohumeral arthritis, right     Hearing loss     History of disease of blood and blood-forming organs     Insomnia     Irritable bowel syndrome     Ischemic cardiomyopathy     IVH (intraventricular hemorrhage) ([facility])     Lipid disorder     Renal cyst     Proteinuria     Sacral fracture ([facility])     Type 2 diabetes mellitus with diabetic polyneuropathy ([facility])     Vitamin D deficiency     Bilateral carotid artery stenosis     Leukocytosis     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[address])     Primary osteoarthritis of right hip     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 macular degeneration, unspecified stage ([facility])     Stage 3a chronic kidney disease ([address])     Parkinson's disease ([address])  Bladder Function:     Voiding Difficulties negative for: urgency, frequent urination (does take a water pill which interacts with that ), hesitancy, straining and incomplete emptying      Voiding Difficulties comments:     Voiding frequency: more than every 4 hours    Urinary leakage: no urine leakage    Urinary leakage not aggravated by: post-void dribble    Nocturia (episodes per night): 1 and 0    Painful urination: No      Intake (ounces): Intake (ounces) comment: Water 3 bottles per day   Can of soda 1x per week (pepsi)  Alcohol beer 1-2 cans per day   Green tea- has stopped this about a week ago   Incontinence Management:     Pads/Diaper Use:  None  Bowel Function:     Voiding DIfficulties: unfinished feeling after defecating and constipation      Bowel frequency: multiple times a day (2-3 times per day )    [place] Stool Scale: type 6 and type 1    Stool softeners: unsure- unable to accurately report. right now light black  :  Sexual Function:     Sexually Active:  Non-contributory  Pain:     No pain reported by patient. Treatments:     None    Patient Goals:     Patient goals for therapy:  Fully empty bladder or bowels, improved bladder or bowel function, improved comfort and improved quality of life    Other patient goals:  "not having diarrhea"       Objective    minimal trunk rotation and arm swing, short shuffling gait, minimal neck ROM       Precautions: standard, cognition?         8/1/2023          Visit Number: #1 #2 #3 #4 #5 #6 #7 #8   Outcome Measure: NONE          Patient Ed           PFM anatomy and function [address] and bowel diary           Pelvic PT purpose, goals, evaluation strategies                                                       Neuro Re-Ed                                                                                        Ther Ex           Warm-up                                                                                         Ther Activity                                 Manual                                                                  Modalities

## 2023-08-01 ENCOUNTER — EVALUATION (OUTPATIENT)
Age: 78
End: 2023-08-01
Payer: COMMERCIAL

## 2023-08-01 DIAGNOSIS — R15.9 FULL INCONTINENCE OF FECES: Primary | ICD-10-CM

## 2023-08-01 PROCEDURE — 97530 THERAPEUTIC ACTIVITIES: CPT

## 2023-08-01 PROCEDURE — 97161 PT EVAL LOW COMPLEX 20 MIN: CPT

## 2023-08-01 RX ORDER — LORAZEPAM 1 MG/1
1 TABLET ORAL EVERY 8 HOURS PRN
COMMUNITY

## 2023-08-01 RX ORDER — VALSARTAN 160 MG/1
160 TABLET ORAL DAILY
COMMUNITY

## 2023-08-05 ENCOUNTER — HOSPITAL ENCOUNTER (OUTPATIENT)
Dept: CT IMAGING | Facility: HOSPITAL | Age: 78
Discharge: HOME | End: 2023-08-05
Attending: INTERNAL MEDICINE
Payer: COMMERCIAL

## 2023-08-05 DIAGNOSIS — R63.4 WEIGHT LOSS: ICD-10-CM

## 2023-08-05 DIAGNOSIS — K52.9 CHRONIC DIARRHEA: ICD-10-CM

## 2023-08-05 PROCEDURE — G1004 CDSM NDSC: HCPCS

## 2023-08-05 PROCEDURE — 74177 CT ABD & PELVIS W/CONTRAST: CPT

## 2023-08-05 RX ADMIN — IOHEXOL 100 ML: 350 INJECTION, SOLUTION INTRAVENOUS at 11:37

## 2023-08-07 ENCOUNTER — OFFICE VISIT (OUTPATIENT)
Dept: FAMILY MEDICINE CLINIC | Facility: CLINIC | Age: 78
End: 2023-08-07
Payer: COMMERCIAL

## 2023-08-07 VITALS
HEIGHT: 63 IN | TEMPERATURE: 97.5 F | OXYGEN SATURATION: 97 % | WEIGHT: 122.8 LBS | SYSTOLIC BLOOD PRESSURE: 124 MMHG | DIASTOLIC BLOOD PRESSURE: 76 MMHG | HEART RATE: 60 BPM | BODY MASS INDEX: 21.76 KG/M2

## 2023-08-07 DIAGNOSIS — H35.3230 BILATERAL EXUDATIVE AGE-RELATED MACULAR DEGENERATION, UNSPECIFIED STAGE (HCC): ICD-10-CM

## 2023-08-07 DIAGNOSIS — I10 ESSENTIAL HYPERTENSION: ICD-10-CM

## 2023-08-07 DIAGNOSIS — G20 PARKINSON'S DISEASE (HCC): ICD-10-CM

## 2023-08-07 DIAGNOSIS — E78.5 DYSLIPIDEMIA: ICD-10-CM

## 2023-08-07 DIAGNOSIS — G47.00 INSOMNIA, UNSPECIFIED TYPE: ICD-10-CM

## 2023-08-07 DIAGNOSIS — F41.9 ANXIETY: ICD-10-CM

## 2023-08-07 DIAGNOSIS — E11.42 TYPE 2 DIABETES MELLITUS WITH DIABETIC POLYNEUROPATHY, WITHOUT LONG-TERM CURRENT USE OF INSULIN (HCC): Primary | ICD-10-CM

## 2023-08-07 DIAGNOSIS — N18.31 STAGE 3A CHRONIC KIDNEY DISEASE (HCC): ICD-10-CM

## 2023-08-07 PROBLEM — D72.829 LEUKOCYTOSIS: Status: RESOLVED | Noted: 2019-03-26 | Resolved: 2023-08-07

## 2023-08-07 PROBLEM — S06.9X9A TRAUMATIC BRAIN INJURY WITH LOSS OF CONSCIOUSNESS (HCC): Status: RESOLVED | Noted: 2019-10-15 | Resolved: 2023-08-07

## 2023-08-07 PROBLEM — S32.10XA SACRAL FRACTURE (HCC): Status: RESOLVED | Noted: 2017-01-24 | Resolved: 2023-08-07

## 2023-08-07 LAB — SL AMB POCT HEMOGLOBIN AIC: 6.8 (ref ?–6.5)

## 2023-08-07 PROCEDURE — 83036 HEMOGLOBIN GLYCOSYLATED A1C: CPT | Performed by: FAMILY MEDICINE

## 2023-08-07 PROCEDURE — 99214 OFFICE O/P EST MOD 30 MIN: CPT | Performed by: FAMILY MEDICINE

## 2023-08-07 NOTE — PROGRESS NOTES
Assessment/Plan:    No problem-specific Assessment & Plan notes found for this encounter. Diagnoses and all orders for this visit:    Type 2 diabetes mellitus with diabetic polyneuropathy, without long-term current use of insulin (HCC)  -     POCT hemoglobin A1c    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 macular degeneration, unspecified stage (HCC)    Essential hypertension    Parkinson's disease (HCC)    Stage 3a chronic kidney disease (HCC)    Dyslipidemia    Anxiety    Insomnia, unspecified type        - Diabetes well controlled with A1C of 6.8. Continue Jardiance 12.5mg and Alogliptin 12.5mg daily   - Blood pressure well controlled, no signs of heart failure on physical examination. Continue management per cardiology for hypertension and CHF  - Continue management per Neurology for Parkinson's with Carbidopa 25 mg/100mg TID for Parkinson's Disease  - Follow up with Nephrology as scheduled for CKD. Subjective:      Patient ID: Ramin Carrasco is a 69 y.o. male. HPI     Ramin Carrasco is a pleasant 69year old male with a past medical history of hypertension, type 2 diabetes mellitus, dyslipidemia and Parkinson's disease who presents today for a follow up. Patient is a poor historian but states that he had a procedure on his left eye for his macular degeneration and reports that he was in an excruciating amount of pain. He used Tramadol which was provided to him previously but states that it did not help with the pain. Today he is not having the pain but will be having another procedure on his eye in October and is asking for pain medicine to given to him at that time. Apart from that he feels well and endorses no complaints at this time. He was evaluated by Gastroenterology for diarrhea and fecal incontinence and had CT abdomen and pelvis although it has not been read yet. He was also referral to physical therapy for incontinence.      The following portions of the patient's history were reviewed and updated as appropriate: allergies, current medications, past family history, past medical history, past social history, past surgical history and problem list.    Review of Systems   Constitutional: Negative. HENT: Negative. Eyes: Positive for visual disturbance. Respiratory: Negative. Cardiovascular: Negative. Gastrointestinal: Positive for diarrhea. Incontinence   Musculoskeletal: Negative. Skin: Negative. Neurological: Negative. Psychiatric/Behavioral: Negative. Objective:      /76 (BP Location: Right arm, Patient Position: Sitting, Cuff Size: Adult)   Pulse 60   Temp 97.5 °F (36.4 °C) (Temporal)   Ht 5' 3.39" (1.61 m)   Wt 55.7 kg (122 lb 12.8 oz)   SpO2 97%   BMI 21.49 kg/m²          Physical Exam  Constitutional:       General: He is not in acute distress. Appearance: He is not ill-appearing. HENT:      Head: Normocephalic and atraumatic. Eyes:      General:         Right eye: No discharge. Left eye: No discharge. Extraocular Movements: Extraocular movements intact. Cardiovascular:      Rate and Rhythm: Normal rate. Pulmonary:      Effort: Pulmonary effort is normal. No respiratory distress. Breath sounds: No wheezing. Abdominal:      General: Bowel sounds are normal. There is no distension. Palpations: Abdomen is soft. Tenderness: There is no abdominal tenderness. Musculoskeletal:      Right lower leg: No edema. Left lower leg: No edema. Neurological:      General: No focal deficit present. Mental Status: He is alert.    Psychiatric:         Mood and Affect: Mood normal.         Behavior: Behavior normal.

## 2023-08-08 ENCOUNTER — NURSE TRIAGE (OUTPATIENT)
Age: 78
End: 2023-08-08

## 2023-08-08 NOTE — TELEPHONE ENCOUNTER
SPOKE WITH PT, INFORMED CT SCAN RESULTS ARE NOT YET FINAL. PT UNDERSTANDS HE WILL BE NOTIFIED WHEN RESULTS ARE AVAILABLE.

## 2023-08-08 NOTE — TELEPHONE ENCOUNTER
Regarding: RESULTS  ----- Message from [facility] sent at 8/8/2023  3:02 PM EDT -----  Patients GI provider:  Dr. Sadler Quick    Number to return call: [phone]    Reason for call: Pt calling requesting to speak with someone regarding results of CT    Scheduled procedure/appointment date if applicable: Apt/procedure

## 2023-08-10 ENCOUNTER — TELEPHONE (OUTPATIENT)
Dept: FAMILY MEDICINE CLINIC | Facility: CLINIC | Age: 78
End: 2023-08-10

## 2023-08-10 NOTE — TELEPHONE ENCOUNTER
Patient called asking for the results of the ct scan I called him and advised that we will call him back as soon as we have the results in.

## 2023-08-11 ENCOUNTER — TELEPHONE (OUTPATIENT)
Dept: FAMILY MEDICINE CLINIC | Facility: CLINIC | Age: 78
End: 2023-08-11

## 2023-08-11 NOTE — TELEPHONE ENCOUNTER
I called patient to advise that the ct was ordered by Dr Ariela Guerrier the gi they should be calling him with the results.

## 2023-08-15 ENCOUNTER — TELEPHONE (OUTPATIENT)
Dept: GASTROENTEROLOGY | Facility: CLINIC | Age: 78
End: 2023-08-15

## 2023-08-21 ENCOUNTER — TELEPHONE (OUTPATIENT)
Age: 78
End: 2023-08-21

## 2023-08-21 NOTE — TELEPHONE ENCOUNTER
Pt calling to schedule a follow up appt with Dr. Alanna Toribio for persistent chronic diarrhea. Please call pt back to schedule.

## 2023-08-21 NOTE — TELEPHONE ENCOUNTER
Unable to reach patient. Left message for patient to call office to schedule OV with Dr. Elizabeth Ortiz.

## 2023-08-22 ENCOUNTER — NURSE TRIAGE (OUTPATIENT)
Age: 78
End: 2023-08-22

## 2023-08-22 NOTE — TELEPHONE ENCOUNTER
Hi,    Can we have him take Banatrol twice a day and follow-up with Dr. Marco Camarillo (I am covering)? Thank you!

## 2023-08-22 NOTE — TELEPHONE ENCOUNTER
Spoke with patient. Patient c/o continued diarrhea. Patient went to physical therapy appointment "felt uncomfortable". Patient feels he has a virus. Stool studies were negative in June, and CT was negative 8/5. Any other suggestions?

## 2023-08-25 ENCOUNTER — TELEPHONE (OUTPATIENT)
Age: 78
End: 2023-08-25

## 2023-08-25 NOTE — TELEPHONE ENCOUNTER
Pt called about banatrol and asked where to get it.  We discussed getting it on [employer] or [employer] episode

## 2023-08-25 NOTE — TELEPHONE ENCOUNTER
I spoke with patient, he has had formed stool past two days. Diarrhea is intermittent. I reviewed he can purchase Banatrol to use if needed for diarrhea, advised he could find a health food store. Patient scheduled for appointment in September.

## 2023-09-05 ENCOUNTER — NURSE TRIAGE (OUTPATIENT)
Age: 78
End: 2023-09-05

## 2023-09-05 NOTE — TELEPHONE ENCOUNTER
Spoke with patient. Clarified Banatrol. Patient will be in tomorrow to follow-up with Dr. Tramaine Flores.

## 2023-09-06 ENCOUNTER — OFFICE VISIT (OUTPATIENT)
Dept: GASTROENTEROLOGY | Facility: MEDICAL CENTER | Age: 78
End: 2023-09-06
Payer: COMMERCIAL

## 2023-09-06 VITALS
BODY MASS INDEX: 21.45 KG/M2 | WEIGHT: 122.6 LBS | DIASTOLIC BLOOD PRESSURE: 88 MMHG | SYSTOLIC BLOOD PRESSURE: 151 MMHG | TEMPERATURE: 97 F | HEART RATE: 77 BPM

## 2023-09-06 DIAGNOSIS — R73.09 ELEVATED HEMOGLOBIN A1C: ICD-10-CM

## 2023-09-06 DIAGNOSIS — K58.0 IRRITABLE BOWEL SYNDROME WITH DIARRHEA: Primary | ICD-10-CM

## 2023-09-06 PROCEDURE — 99214 OFFICE O/P EST MOD 30 MIN: CPT | Performed by: INTERNAL MEDICINE

## 2023-09-06 NOTE — PROGRESS NOTES
[facility] - Outpatient Follow-up Note  Laly Sweet 69 y.o. male MRN: [id number]  Encounter: [id number]          ASSESSMENT AND PLAN:      1. Irritable bowel syndrome with diarrhea    2. Elevated hemoglobin A1c  I discussed with the patient regarding his previous lab works and reviewed CT results with him. It is most likely his symptoms are secondary to irritable bowel syndrome diarrhea. We discussed the importance of high-fiber diet. He started taking probiotics. I offered to repeat colonoscopy to rule out any other colon lesions. He refused to undergo colonoscopy again. He was noticed to have elevated hemoglobin A1c. I am referring him to nutrition service for diet modification. Follow-up with PMD Dr. Jessica Chaudhary for elevated creatinine and HbA1c.  - Ambulatory Referral to Nutrition Services; Future  Follow-up in 6 months.  ______________________________________________________________________    SUBJECTIVE:       67-year-old man with IBS D presented for follow-up. Patient reported he still has intermittent diarrhea. He refused to go to physical therapy for biofeedback work-ups. He reported 80% of his bowel movement with formed and regular and around 20% of the time he has intermittent diarrhea. He had extensive work-ups in the past with negative celiac panel, negative for infectious causes and negative for inflammation diarrhea. He had unintentional weight loss in the past.  In the past 6 months his weight has been stable at 121 -122 pounds. He underwent a CT scan with negative findings of acute causes of diarrhea. He was noticed to have elevated hemoglobin A1c. His last colonoscopy was in 2019 showing normal findings and the prep was good. REVIEW OF SYSTEMS IS OTHERWISE NEGATIVE.       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/2019   • Ureter, calculus    • Urinary stone      Past Surgical History:   Procedure Laterality Date   • CARDIAC SURGERY     • CHOLECYSTECTOMY     • CYSTOSCOPY W/ URETEROSCOPY       Social History   Social History     Substance and Sexual Activity   Alcohol Use Yes   • Alcohol/week: 21.0 standard drinks of alcohol   • Types: 14 Cans of beer, 7 Shots of liquor per week    Comment: occ     Social History     Substance and Sexual Activity   Drug Use No     Social History     Tobacco Use   Smoking Status Never   Smokeless Tobacco Never     Family History   Problem Relation Age of Onset   • Cancer Mother    • Arthritis Father    • Heart disease Father         cardiac disorder   • Cancer Family    • Arthritis Family        Meds/Allergies       Current Outpatient Medications:   •  Alogliptin Benzoate 25 MG TABS  •  aspirin (ECOTRIN LOW STRENGTH) 81 mg EC tablet  •  carbidopa-levodopa (SINEMET CR)  mg TBCR per ER tablet  •  clotrimazole-betamethasone (LOTRISONE) 1-0.05 % cream  •  dextran 70-hypromellose (GENTEAL TEARS) 0.1-0.3 % ophthalmic solution  •  Diclofenac Sodium (VOLTAREN) 1 %  •  Empagliflozin 25 MG TABS  •  Entresto 24-26 MG TABS  •  erythromycin (ILOTYCIN) ophthalmic ointment  •  fluticasone (FLONASE) 50 mcg/act nasal spray  •  furosemide (LASIX) 40 mg tablet  •  glucose blood test strip  •  metoprolol succinate (TOPROL-XL) 50 mg 24 hr tablet  •  mirtazapine (REMERON) 7.5 MG tablet  •  Multiple Vitamin (MULTIVITAMINS PO)  •  QUEtiapine (SEROquel) 50 mg tablet  •  simvastatin (ZOCOR) 80 mg tablet  •  spironolactone (ALDACTONE) 25 mg tablet  •  traMADol (Ultram) 50 mg tablet  •  hydrOXYzine HCL (ATARAX) 25 mg tablet  •  LORazepam (ATIVAN) 1 mg tablet  •  valsartan (DIOVAN) 160 mg tablet    Allergies   Allergen Reactions   • Ketotifen Diarrhea and Other (See Comments)   • Metformin Diarrhea           Objective Blood pressure 151/88, pulse 77, temperature (!) 97 °F (36.1 °C), weight 55.6 kg (122 lb 9.6 oz). Body mass index is 21.45 kg/m². PHYSICAL EXAM:      General Appearance:   Alert, cooperative, no distress   HEENT:   Normocephalic, atraumatic, anicteric.     Neck:  Supple, symmetrical, trachea midline   Lungs:   Clear to auscultation bilaterally; no rales, rhonchi or wheezing; respirations unlabored    Heart[de-identified]   Regular rate and rhythm; no murmur, rub, or gallop. Abdomen:   Soft, non-tender, non-distended; normal bowel sounds; no masses, no organomegaly    Genitalia:   Deferred    Rectal:   Deferred    Extremities:  No cyanosis, clubbing or edema    Pulses:  2+ and symmetric    Skin:  No jaundice, rashes, or lesions    Lymph nodes:  No palpable cervical lymphadenopathy        Lab Results:   No visits with results within 1 Day(s) from this visit. Latest known visit with results is:   Office Visit on 08/07/2023   Component Date Value   • Hemoglobin A1C 08/07/2023 6.8 (A)          Radiology Results:   No results found.

## 2023-09-18 ENCOUNTER — APPOINTMENT (OUTPATIENT)
Dept: LAB | Facility: CLINIC | Age: 78
End: 2023-09-18
Payer: COMMERCIAL

## 2023-09-18 DIAGNOSIS — E11.21 DIABETIC GLOMERULOPATHY (HCC): ICD-10-CM

## 2023-09-18 DIAGNOSIS — N18.31 CHRONIC KIDNEY DISEASE (CKD) STAGE G3A/A1, MODERATELY DECREASED GLOMERULAR FILTRATION RATE (GFR) BETWEEN 45-59 ML/MIN/1.73 SQUARE METER AND ALBUMINURIA CREATININE RATIO LESS THAN 30 MG/G (HCC): ICD-10-CM

## 2023-09-18 DIAGNOSIS — R80.1 PERSISTENT PROTEINURIA: ICD-10-CM

## 2023-09-18 DIAGNOSIS — E55.9 AVITAMINOSIS D: ICD-10-CM

## 2023-09-18 LAB
25(OH)D3 SERPL-MCNC: 34.4 NG/ML (ref 30–100)
CREAT UR-MCNC: 27.3 MG/DL
HAV IGM SER QL: NORMAL
HBV CORE IGM SER QL: NORMAL
HBV SURFACE AG SER QL: NORMAL
HCV AB SER QL: NORMAL
HGB BLD-MCNC: 15.4 G/DL (ref 12–17)
MAGNESIUM SERPL-MCNC: 2.3 MG/DL (ref 1.9–2.7)
PROT UR-MCNC: 18 MG/DL
PTH-INTACT SERPL-MCNC: 42.6 PG/ML (ref 12–88)
URATE SERPL-MCNC: 5.7 MG/DL (ref 3.5–8.5)

## 2023-09-18 PROCEDURE — 83970 ASSAY OF PARATHORMONE: CPT

## 2023-09-18 PROCEDURE — 82306 VITAMIN D 25 HYDROXY: CPT

## 2023-09-18 PROCEDURE — 82570 ASSAY OF URINE CREATININE: CPT

## 2023-09-18 PROCEDURE — 36415 COLL VENOUS BLD VENIPUNCTURE: CPT

## 2023-09-18 PROCEDURE — 84156 ASSAY OF PROTEIN URINE: CPT

## 2023-09-18 PROCEDURE — 80069 RENAL FUNCTION PANEL: CPT

## 2023-09-18 PROCEDURE — 83735 ASSAY OF MAGNESIUM: CPT

## 2023-09-18 PROCEDURE — 84550 ASSAY OF BLOOD/URIC ACID: CPT

## 2023-09-18 PROCEDURE — 84165 PROTEIN E-PHORESIS SERUM: CPT

## 2023-09-18 PROCEDURE — 80074 ACUTE HEPATITIS PANEL: CPT

## 2023-09-18 PROCEDURE — 85018 HEMOGLOBIN: CPT

## 2023-09-19 LAB
ALBUMIN SERPL BCP-MCNC: 4.5 G/DL (ref 3.5–5)
ANION GAP SERPL CALCULATED.3IONS-SCNC: 13 MMOL/L
BUN SERPL-MCNC: 38 MG/DL (ref 5–25)
CALCIUM SERPL-MCNC: 9.6 MG/DL (ref 8.4–10.2)
CHLORIDE SERPL-SCNC: 103 MMOL/L (ref 96–108)
CO2 SERPL-SCNC: 19 MMOL/L (ref 21–32)
CREAT SERPL-MCNC: 1.44 MG/DL (ref 0.6–1.3)
CREAT UR-MCNC: 27.3 MG/DL
GFR SERPL CREATININE-BSD FRML MDRD: 46 ML/MIN/1.73SQ M
GLUCOSE SERPL-MCNC: 118 MG/DL (ref 65–140)
PHOSPHATE SERPL-MCNC: 3.4 MG/DL (ref 2.3–4.1)
POTASSIUM SERPL-SCNC: 5.5 MMOL/L (ref 3.5–5.3)
PROT UR-MCNC: 18 MG/DL
PROT/CREAT UR: 0.66 MG/G{CREAT} (ref 0–0.1)
SODIUM SERPL-SCNC: 135 MMOL/L (ref 135–147)

## 2023-09-20 LAB
ALBUMIN SERPL ELPH-MCNC: 4.33 G/DL (ref 3.2–5.1)
ALBUMIN SERPL ELPH-MCNC: 62.8 % (ref 48–70)
ALPHA1 GLOB SERPL ELPH-MCNC: 0.24 G/DL (ref 0.15–0.47)
ALPHA1 GLOB SERPL ELPH-MCNC: 3.5 % (ref 1.8–7)
ALPHA2 GLOB SERPL ELPH-MCNC: 0.68 G/DL (ref 0.42–1.04)
ALPHA2 GLOB SERPL ELPH-MCNC: 9.8 % (ref 5.9–14.9)
BETA GLOB ABNORMAL SERPL ELPH-MCNC: 0.38 G/DL (ref 0.31–0.57)
BETA1 GLOB SERPL ELPH-MCNC: 5.5 % (ref 4.7–7.7)
BETA2 GLOB SERPL ELPH-MCNC: 5.3 % (ref 3.1–7.9)
BETA2+GAMMA GLOB SERPL ELPH-MCNC: 0.37 G/DL (ref 0.2–0.58)
GAMMA GLOB ABNORMAL SERPL ELPH-MCNC: 0.9 G/DL (ref 0.4–1.66)
GAMMA GLOB SERPL ELPH-MCNC: 13.1 % (ref 6.9–22.3)
IGG/ALB SER: 1.69 {RATIO} (ref 1.1–1.8)
PROT PATTERN SERPL ELPH-IMP: NORMAL
PROT SERPL-MCNC: 6.9 G/DL (ref 6.4–8.4)

## 2023-09-20 PROCEDURE — 84166 PROTEIN E-PHORESIS/URINE/CSF: CPT | Performed by: STUDENT IN AN ORGANIZED HEALTH CARE EDUCATION/TRAINING PROGRAM

## 2023-09-20 PROCEDURE — 84165 PROTEIN E-PHORESIS SERUM: CPT | Performed by: STUDENT IN AN ORGANIZED HEALTH CARE EDUCATION/TRAINING PROGRAM

## 2023-10-09 ENCOUNTER — APPOINTMENT (OUTPATIENT)
Dept: LAB | Facility: CLINIC | Age: 78
End: 2023-10-09
Payer: COMMERCIAL

## 2023-10-09 DIAGNOSIS — E55.9 AVITAMINOSIS D: ICD-10-CM

## 2023-10-09 DIAGNOSIS — N18.31 CHRONIC KIDNEY DISEASE (CKD) STAGE G3A/A1, MODERATELY DECREASED GLOMERULAR FILTRATION RATE (GFR) BETWEEN 45-59 ML/MIN/1.73 SQUARE METER AND ALBUMINURIA CREATININE RATIO LESS THAN 30 MG/G (HCC): ICD-10-CM

## 2023-10-09 DIAGNOSIS — R80.1 PERSISTENT PROTEINURIA: ICD-10-CM

## 2023-10-09 DIAGNOSIS — E11.21 DIABETIC GLOMERULOPATHY (HCC): ICD-10-CM

## 2023-10-09 LAB
ALBUMIN SERPL BCP-MCNC: 4.3 G/DL (ref 3.5–5)
ANION GAP SERPL CALCULATED.3IONS-SCNC: 8 MMOL/L
BUN SERPL-MCNC: 37 MG/DL (ref 5–25)
CALCIUM SERPL-MCNC: 9.7 MG/DL (ref 8.4–10.2)
CHLORIDE SERPL-SCNC: 105 MMOL/L (ref 96–108)
CO2 SERPL-SCNC: 26 MMOL/L (ref 21–32)
CREAT SERPL-MCNC: 1.28 MG/DL (ref 0.6–1.3)
GFR SERPL CREATININE-BSD FRML MDRD: 53 ML/MIN/1.73SQ M
GLUCOSE P FAST SERPL-MCNC: 111 MG/DL (ref 65–99)
PHOSPHATE SERPL-MCNC: 3.4 MG/DL (ref 2.3–4.1)
POTASSIUM SERPL-SCNC: 5 MMOL/L (ref 3.5–5.3)
SODIUM SERPL-SCNC: 139 MMOL/L (ref 135–147)

## 2023-10-09 PROCEDURE — 36415 COLL VENOUS BLD VENIPUNCTURE: CPT

## 2023-10-09 PROCEDURE — 80069 RENAL FUNCTION PANEL: CPT

## 2023-10-17 ENCOUNTER — TELEPHONE (OUTPATIENT)
Dept: FAMILY MEDICINE CLINIC | Facility: CLINIC | Age: 78
End: 2023-10-17

## 2023-10-17 NOTE — TELEPHONE ENCOUNTER
Patient called c/o increased pain in eye, states you had discussed changing his pain medication to something different if the pain changed. He was taking Tramadol, he has finished them now, used sparingly. Please advise.

## 2023-10-18 NOTE — TELEPHONE ENCOUNTER
Yes patient did mention during our office visit that he was having another eye surgery in October so I take it he already had it then? If Tramadol is not working then I can give him a few pills of another pain medication but no more.  If he's still having pain then he has to see his ophthalmologist

## 2023-10-20 NOTE — TELEPHONE ENCOUNTER
Patient called again he is asking if you would be able to send this medication for him. He states that he usually gets pain after the procedure and he is ok if you could only give him  a few, he is asking for us to call him back if any medication is sent.

## 2023-10-23 DIAGNOSIS — H35.3230 BILATERAL EXUDATIVE AGE-RELATED MACULAR DEGENERATION, UNSPECIFIED STAGE (HCC): Primary | ICD-10-CM

## 2023-10-23 RX ORDER — NALOXONE HYDROCHLORIDE 4 MG/.1ML
SPRAY NASAL
Qty: 1 EACH | Refills: 1 | Status: SHIPPED | OUTPATIENT
Start: 2023-10-23 | End: 2024-10-22

## 2023-10-23 RX ORDER — OXYCODONE AND ACETAMINOPHEN 2.5; 325 MG/1; MG/1
1 TABLET ORAL EVERY 8 HOURS PRN
Qty: 6 TABLET | Refills: 0 | Status: SHIPPED | OUTPATIENT
Start: 2023-10-23 | End: 2023-10-25

## 2023-10-23 NOTE — TELEPHONE ENCOUNTER
I sent a prescription for low dose percocet to take after the procedure, a script for Naloxone was also sent as patient has lorazepam which he takes for anxiety. If he needs a stronger medication then he will have to ask his ophthalmologist for that.

## 2023-12-11 LAB
LEFT EYE DIABETIC RETINOPATHY: NORMAL
RIGHT EYE DIABETIC RETINOPATHY: NORMAL
SEVERITY (EYE EXAM): NORMAL

## 2024-01-03 ENCOUNTER — TELEPHONE (OUTPATIENT)
Dept: FAMILY MEDICINE CLINIC | Facility: CLINIC | Age: 79
End: 2024-01-03

## 2024-01-03 DIAGNOSIS — H35.3230 BILATERAL EXUDATIVE AGE-RELATED MACULAR DEGENERATION, UNSPECIFIED STAGE (HCC): Primary | ICD-10-CM

## 2024-01-03 NOTE — TELEPHONE ENCOUNTER
Patient called asking for eye medication for eye pain he will be seen by the eye doctor in two weeks. endocet

## 2024-01-08 RX ORDER — OXYCODONE AND ACETAMINOPHEN 2.5; 325 MG/1; MG/1
1 TABLET ORAL EVERY 8 HOURS PRN
Qty: 6 TABLET | Refills: 0 | Status: SHIPPED | OUTPATIENT
Start: 2024-01-08 | End: 2024-01-10

## 2024-01-08 NOTE — TELEPHONE ENCOUNTER
I spoke to patient and advised, he Is asking why you could not prescribe this for him since he does not abuse it and he only uses it when he has the procedure. Ophthalmology would not prescribe this. I did ask him to make an apt with you for 01/31. He said he will talk to you about it then also.

## 2024-01-08 NOTE — TELEPHONE ENCOUNTER
2 day supply sent. No more to be given, if he needs pain medication he needs to talk with ophthalmology

## 2024-01-12 ENCOUNTER — TELEPHONE (OUTPATIENT)
Dept: ADMINISTRATIVE | Facility: OTHER | Age: 79
End: 2024-01-12

## 2024-01-12 NOTE — TELEPHONE ENCOUNTER
Upon review of the In Basket request we were able to locate, review, and update the patient chart as requested for Diabetic Eye Exam.    Any additional questions or concerns should be emailed to the Practice Liaisons via the appropriate education email address, please do not reply via In Basket.    Thank you  Patience Fletcher

## 2024-01-12 NOTE — TELEPHONE ENCOUNTER
----- Message from Tess Chung sent at 1/11/2024  3:55 PM EST -----  Regarding: Care Gap Request  01/11/24 3:55 PM    Hello, our patient attached above has had Diabetic Eye Exam completed/performed. Please assist in updating the patient chart by pulling the document from the Media Tab. The date of service is 12/20/23.     Thank you,  Tess Chung  [facility]

## 2024-01-31 ENCOUNTER — OFFICE VISIT (OUTPATIENT)
Dept: FAMILY MEDICINE CLINIC | Facility: CLINIC | Age: 79
End: 2024-01-31
Payer: COMMERCIAL

## 2024-01-31 VITALS
HEART RATE: 57 BPM | DIASTOLIC BLOOD PRESSURE: 68 MMHG | OXYGEN SATURATION: 100 % | BODY MASS INDEX: 21.79 KG/M2 | HEIGHT: 63 IN | SYSTOLIC BLOOD PRESSURE: 122 MMHG | TEMPERATURE: 96.8 F | WEIGHT: 123 LBS

## 2024-01-31 DIAGNOSIS — I10 ESSENTIAL HYPERTENSION: ICD-10-CM

## 2024-01-31 DIAGNOSIS — Z00.00 MEDICARE ANNUAL WELLNESS VISIT, SUBSEQUENT: Primary | ICD-10-CM

## 2024-01-31 DIAGNOSIS — G20.A1 PARKINSON'S DISEASE, UNSPECIFIED WHETHER DYSKINESIA PRESENT, UNSPECIFIED WHETHER MANIFESTATIONS FLUCTUATE: ICD-10-CM

## 2024-01-31 DIAGNOSIS — E11.42 TYPE 2 DIABETES MELLITUS WITH DIABETIC POLYNEUROPATHY, WITHOUT LONG-TERM CURRENT USE OF INSULIN (HCC): ICD-10-CM

## 2024-01-31 DIAGNOSIS — I50.20 HEART FAILURE WITH REDUCED EJECTION FRACTION (HCC): ICD-10-CM

## 2024-01-31 DIAGNOSIS — N18.31 STAGE 3A CHRONIC KIDNEY DISEASE (HCC): ICD-10-CM

## 2024-01-31 DIAGNOSIS — H35.3230 BILATERAL EXUDATIVE AGE-RELATED MACULAR DEGENERATION, UNSPECIFIED STAGE (HCC): ICD-10-CM

## 2024-01-31 DIAGNOSIS — E78.5 DYSLIPIDEMIA: ICD-10-CM

## 2024-01-31 PROCEDURE — 99214 OFFICE O/P EST MOD 30 MIN: CPT | Performed by: FAMILY MEDICINE

## 2024-01-31 PROCEDURE — G0439 PPPS, SUBSEQ VISIT: HCPCS | Performed by: FAMILY MEDICINE

## 2024-01-31 NOTE — PROGRESS NOTES
Assessment and Plan:     Problem List Items Addressed This Visit       Type 2 diabetes mellitus with diabetic polyneuropathy (HCC)    Relevant Orders    Hemoglobin A1C    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 macular degeneration, unspecified stage (HCC)    Stage 3a chronic kidney disease (HCC)    Parkinson's disease, unspecified whether dyskinesia present, unspecified whether manifestations fluctuate    Heart failure with reduced ejection fraction (HCC)     Other Visit Diagnoses       Medicare annual wellness visit, subsequent    -  Primary               - Diabetes is well controlled; continue current regimen of Jardiance 12.5mg and Alogliptin 25mg daily per Endocrinology  - Hypertension well controlled; noted from cardiology reviewed. Patient will continue current regimen of metoprolol 50mg BID, Spironolactone 25mg. Continue Entresto 49-51mg BID and lasix for CHF. Continue aspirin 81mg and Zocor 80mg for coronary artery disease   - Most recent renal panel reviewed; continue management per Nephrology   - Continue Sinemet per Neurology for Parkinson's disease   - Continue intravitreal injections for macular degeneration   Preventive health issues were discussed with patient, and age appropriate screening tests were ordered as noted in patient's After Visit Summary.  Personalized health advice and appropriate referrals for health education or preventive services given if needed, as noted in patient's After Visit Summary.     History of Present Illness:     JANA Haro is a very pleasant 78 year old male with a past medical history of hypertension, CHF, CKD, type 2 diabetes mellitus and Parkinson's disease who presents today for a Medicare Wellness visit. Overall he feels well and endorses no complaints at this time. He continues to follow with multiple specialties including cardiology and Nephrology and ophthalmology. He is getting intravitreal injection for macular degeneration. [facility] He remains compliant with prescribed medications, and is tolerating them without difficulty.       Patient Care Team:  Brittney Park MD as PCP - General (Family Medicine)  DO Manny Saldana MD Brian Murphy, MD (Urology)  Alexsander Pierson DO (Inactive) (Sleep Medicine)  Waqar Cooper MD (Cardiology)     Review of Systems:     Review of Systems   Constitutional: Negative.    HENT: Negative.     Eyes: Negative.    Respiratory: Negative.     Cardiovascular: Negative.    Gastrointestinal: Negative.    Musculoskeletal: Negative.    Skin: Negative.    Neurological: Negative.    Psychiatric/Behavioral: Negative.          Problem List:     Patient Active Problem List   Diagnosis    Anemia    Anxiety    Back pain    Coronary atherosclerosis    Diverticulosis of colon    BPH with urinary obstruction    Essential hypertension    Fracture of C1 vertebra, closed ([facility])    Glenohumeral arthritis, right    Hearing loss    History of disease of blood and blood-forming organs    Insomnia    Irritable bowel syndrome    Ischemic cardiomyopathy    IVH (intraventricular hemorrhage) ([facility])    Renal cyst    Proteinuria    Type 2 diabetes mellitus with diabetic polyneuropathy ([facility])    Vitamin D deficiency    Bilateral carotid artery stenosis    Primary osteoarthritis of right hip    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 macular degeneration, unspecified stage ([facility])    Stage 3a chronic kidney disease ([facility])    Parkinson's disease, unspecified whether dyskinesia present, unspecified whether manifestations fluctuate    Dyslipidemia    Heart failure with reduced ejection fraction ([facility])      Past Medical and Surgical History:     Past Medical History:   Diagnosis Date    Anxiety     Arthritis     BPH with obstruction/lower urinary tract symptoms     Diabetes mellitus (HCC)     Hypertension     Renal calculi     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[facility]) 10/15/2019     Ureter, calculus     Urinary stone      Past Surgical History:   Procedure Laterality Date    CARDIAC SURGERY      CHOLECYSTECTOMY      CYSTOSCOPY W/ URETEROSCOPY        Family History:     Family History   Problem Relation Age of Onset    Cancer Mother     Arthritis Father     Heart disease Father         cardiac disorder    Cancer Family     Arthritis Family       Social History:     Social History     Socioeconomic History    Marital status: Single     Spouse name: None    Number of children: None    Years of education: None    Highest education level: None   Occupational History    Occupation: retired   Tobacco Use    Smoking status: Never    Smokeless tobacco: Never   Vaping Use    Vaping status: Never Used   Substance and Sexual Activity    Alcohol use: Yes     Alcohol/week: 21.0 standard drinks of alcohol     Types: 14 Cans of beer, 7 Shots of liquor per week     Comment: occ    Drug use: No    Sexual activity: None   Other Topics Concern    None   Social History Narrative    Caffeine use      Social Determinants of Health     Financial Resource Strain: Low Risk  (1/31/2024)    Overall Financial Resource Strain (CARDIA)     Difficulty of Paying Living Expenses: Not hard at all   Food Insecurity: Not on file   Transportation Needs: No Transportation Needs (1/31/2024)    PRAPARE - Transportation     Lack of Transportation (Medical): No     Lack of Transportation (Non-Medical): No   Physical Activity: Not on file   Stress: Not on file   Social Connections: Not on file   Intimate Partner Violence: Not on file   Housing Stability: Not on file      Medications and Allergies:     Current Outpatient Medications   Medication Sig Dispense Refill    aspirin (ECOTRIN LOW STRENGTH) 81 mg EC tablet 81 mg      carbidopa-levodopa (SINEMET CR)  mg TBCR per ER tablet Take 1 tablet by mouth 3 (three) times a day      clotrimazole-betamethasone (LOTRISONE) 1-0.05 % cream Apply topically 2 (two) times a day 30 g 0     dextran 70-hypromellose (GENTEAL TEARS) 0.1-0.3 % ophthalmic solution as needed      Diclofenac Sodium (VOLTAREN) 1 % APPLY 4GM TO LOWER EXTREMITIES TOPICALLY TWICE A DAY **USE DOSING CARD** (DO NOT EXCEED 16 GRAMS DAILY TO ANY AFFECTED JOINT OF THE LOWER EXTREMITIES. DO NOT EXCEED 8 GRAMS DAILY TO ANY AFFECTED JOINT OF THE UPPER EXTREMITIES. DO NOT EXCEED A TOTAL DOSE OF 32 GRAMS DAILY OVER ALL JOINTS)      Empagliflozin 25 MG TABS 12.5 mg      Entresto 24-26 MG TABS Take 1 tablet by mouth 2 (two) times a day      erythromycin (ILOTYCIN) ophthalmic ointment apply A 1 CM RIBBON OF OINTMENT INTO LOWER CONJUNCTIVAL SAC OF RI...  (REFER TO PRESCRIPTION NOTES).      fluticasone (FLONASE) 50 mcg/act nasal spray INSTILL 1 SPRAY IN NOSTRIL(S) TWICE A DAY FOR NASAL ALLERGIES IN EACH NOSTRIL      furosemide (LASIX) 40 mg tablet Take 40 mg by mouth daily      glucose blood test strip Test once daily and as needed 300 each 3    LORazepam (ATIVAN) 1 mg tablet Take 1 mg by mouth every 8 (eight) hours as needed for anxiety      metoprolol succinate (TOPROL-XL) 50 mg 24 hr tablet Take 50 mg by mouth      mirtazapine (REMERON) 7.5 MG tablet Take 1 tablet (7.5 mg total) by mouth daily at bedtime (Patient taking differently: Take 15 mg by mouth daily at bedtime) 30 tablet 1    Multiple Vitamin (MULTIVITAMINS PO) TAKE ONE CAP/TAB BY MOUTH EVERY MORNING FOR SUPPLEMENTATION      QUEtiapine (SEROquel) 50 mg tablet Take 0.5 tablets by mouth daily at bedtime      simvastatin (ZOCOR) 80 mg tablet Take 80 mg by mouth      spironolactone (ALDACTONE) 25 mg tablet Take 1 tablet by mouth daily      valsartan (DIOVAN) 160 mg tablet Take 160 mg by mouth daily      Alogliptin Benzoate 25 MG TABS Take 0.5 tablets by mouth daily      hydrOXYzine HCL (ATARAX) 25 mg tablet 50 mg (Patient not taking: Reported on 9/6/2023)      naloxone (NARCAN) 4 mg/0.1 mL nasal spray Administer 1 spray into a nostril. If no response after 2-3 minutes, give another dose  in the other nostril using a new spray. (Patient not taking: Reported on 1/31/2024) 1 each 1     No current facility-administered medications for this visit.     Allergies   Allergen Reactions    Ketotifen Diarrhea and Other (See Comments)    Metformin Diarrhea      Immunizations:     Immunization History   Administered Date(s) Administered    COVID-19 MODERNA VACC 0.5 ML IM 02/08/2021, 03/08/2021, 12/07/2021    H1N1, All Formulations 12/01/2009    INFLUENZA 10/01/2002, 12/06/2002, 10/01/2003, 10/26/2005, 11/27/2006, 10/15/2007, 11/12/2008, 09/14/2009, 11/08/2010, 10/19/2011, 10/15/2012, 10/31/2012, 11/14/2012, 10/02/2013, 10/28/2014, 01/01/2016, 10/18/2016, 09/01/2018, 10/09/2018, 10/30/2019, 10/01/2021, 10/17/2021, 09/15/2022, 09/18/2023    Influenza Quadrivalent Preservative Free 3 years and older IM 10/09/2018, 10/30/2019    Influenza, injectable, quadrivalent, preservative free 0.5 mL 10/09/2018, 10/30/2019    Influenza, seasonal, injectable, preservative free 10/07/2015, 10/18/2016, 11/15/2017, 10/15/2018    Pneumococcal 10/26/2005, 01/01/2016    Pneumococcal Conjugate 13-Valent 12/22/2016    Pneumococcal Polysaccharide PPV23 10/26/2005, 05/17/2012, 01/10/2018, 11/03/2021    Tdap 01/21/2011, 07/07/2015    Zoster 08/01/2012, 07/19/2013, 01/30/2020, 09/22/2020, 10/25/2021, 01/13/2022    Zoster Vaccine Recombinant 01/30/2020, 09/22/2020, 10/25/2021, 01/13/2022      Health Maintenance:         Topic Date Due    Hepatitis C Screening  Completed    Colorectal Cancer Screening  Discontinued         Topic Date Due    COVID-19 Vaccine (4 - 2023-24 season) 09/01/2023      Medicare Screening Tests and Risk Assessments:     Elder is here for his Subsequent Wellness visit.     Health Risk Assessment:   Patient rates overall health as good. Patient feels that their physical health rating is same. Patient is satisfied with their life. Eyesight was rated as same. Hearing was rated as same. Patient feels that their emotional  and mental health rating is same. Patients states they are never, rarely angry. Patient states they are sometimes unusually tired/fatigued. Pain experienced in the last 7 days has been none. Patient states that he has experienced no weight loss or gain in last 6 months.     Depression Screening:   PHQ-2 Score: 0      Fall Risk Screening:   In the past year, patient has experienced: no history of falling in past year      Home Safety:  Patient does not have trouble with stairs inside or outside of their home. Patient has working smoke alarms and has no working carbon monoxide detector. Home safety hazards include: none.     Nutrition:   Vegetarian     Medications:   Patient is currently taking over-the-counter supplements. OTC medications include: see medication list. Patient is able to manage medications.     Activities of Daily Living (ADLs)/Instrumental Activities of Daily Living (IADLs):   Walk and transfer into and out of bed and chair?: Yes  Dress and groom yourself?: Yes    Bathe or shower yourself?: Yes    Feed yourself? Yes  Do your laundry/housekeeping?: Yes  Manage your money, pay your bills and track your expenses?: Yes  Make your own meals?: Yes    Do your own shopping?: Yes    Previous Hospitalizations:   Any hospitalizations or ED visits within the last 12 months?: Yes    How many hospitalizations have you had in the last year?: 1-2    Advance Care Planning:   Living will: Yes    Durable POA for healthcare: Yes    Advanced directive: Yes      PREVENTIVE SCREENINGS      Cardiovascular Screening:    General: Screening Current      Diabetes Screening:     General: Screening Not Indicated and History Diabetes      Prostate Cancer Screening:    General: Screening Not Indicated      Lung Cancer Screening:     General: Screening Not Indicated      Hepatitis C Screening:    General: Screening Current    Screening, Brief Intervention, and Referral to Treatment (SBIRT)    Screening  Typical number of drinks in a  "day: 1    Single Item Drug Screening:  How often have you used an illegal drug (including marijuana) or a prescription medication for non-medical reasons in the past year? never    Single Item Drug Screen Score: 0  Interpretation: Negative screen for possible drug use disorder    No results found.     Physical Exam:     /68 (BP Location: Left arm, Patient Position: Sitting, Cuff Size: Standard)   Pulse 57   Temp (!) 96.8 °F (36 °C) (Temporal)   Ht 5' 3.39\" (1.61 m)   Wt 55.8 kg (123 lb)   SpO2 100%   BMI 21.52 kg/m²     Physical Exam  Constitutional:       General: He is not in acute distress.     Appearance: He is not ill-appearing.   HENT:      Head: Normocephalic and atraumatic.   Eyes:      General:         Right eye: No discharge.         Left eye: No discharge.      Extraocular Movements: Extraocular movements intact.   Cardiovascular:      Rate and Rhythm: Normal rate.      Pulses: Pulses are weak.           Dorsalis pedis pulses are 1+ on the right side and 1+ on the left side.        Posterior tibial pulses are 0 on the right side and 0 on the left side.   Pulmonary:      Effort: Pulmonary effort is normal. No respiratory distress.      Breath sounds: No wheezing.   Abdominal:      General: Bowel sounds are normal. There is no distension.      Palpations: Abdomen is soft.      Tenderness: There is no abdominal tenderness.   Musculoskeletal:      Right lower leg: No edema.      Left lower leg: No edema.   Feet:      Right foot:      Skin integrity: No ulcer, skin breakdown, erythema, warmth, callus or dry skin.      Left foot:      Skin integrity: No ulcer, skin breakdown, erythema, warmth, callus or dry skin.   Neurological:      General: No focal deficit present.      Mental Status: He is alert.   Psychiatric:         Mood and Affect: Mood normal.         Behavior: Behavior normal.    Patient's shoes and socks removed.    Right Foot/Ankle   Right Foot Inspection  Skin Exam: skin normal and skin " intact. No dry skin, no warmth, no callus, no erythema, no maceration, no abnormal color, no pre-ulcer, no ulcer and no callus.     Sensory   Proprioception: intact  Monofilament testing: intact    Vascular  The right DP pulse is 1+. The right PT pulse is 0.     Left Foot/Ankle  Left Foot Inspection  Skin Exam: skin normal and skin intact. No dry skin, no warmth, no erythema, no maceration, normal color, no pre-ulcer, no ulcer and no callus.     Sensory   Proprioception: intact  Monofilament testing: intact    Vascular  The left DP pulse is 1+. The left PT pulse is 0.     Assign Risk Category  No deformity present  No loss of protective sensation  Weak pulses  Risk: 1     Brittney Park MD

## 2024-01-31 NOTE — PATIENT INSTRUCTIONS
Medicare Preventive Visit Patient Instructions  Thank you for completing your Welcome to Medicare Visit or Medicare Annual Wellness Visit today. Your next wellness visit will be due in one year (1/31/2025).  The screening/preventive services that you may require over the next 5-10 years are detailed below. Some tests may not apply to you based off risk factors and/or age. Screening tests ordered at today's visit but not completed yet may show as past due. Also, please note that scanned in results may not display below.  Preventive Screenings:  Service Recommendations Previous Testing/Comments   Colorectal Cancer Screening  Colonoscopy    Fecal Occult Blood Test (FOBT)/Fecal Immunochemical Test (FIT)  Fecal DNA/Cologuard Test  Flexible Sigmoidoscopy Age: 45-75 years old   Colonoscopy: every 10 years (May be performed more frequently if at higher risk)  OR  FOBT/FIT: every 1 year  OR  Cologuard: every 3 years  OR  Sigmoidoscopy: every 5 years  Screening may be recommended earlier than age 45 if at higher risk for colorectal cancer. Also, an individualized decision between you and your healthcare provider will decide whether screening between the ages of 76-85 would be appropriate. Colonoscopy: 03/19/2019  FOBT/FIT: Not on file  Cologuard: Not on file  Sigmoidoscopy: Not on file          Prostate Cancer Screening Individualized decision between patient and health care provider in men between ages of 55-69   Medicare will cover every 12 months beginning on the day after your 50th birthday PSA: 4.9 ng/mL           Hepatitis C Screening Once for adults born between 1945 and 1965  More frequently in patients at high risk for Hepatitis C Hep C Antibody: 09/18/2023        Diabetes Screening 1-2 times per year if you're at risk for diabetes or have pre-diabetes Fasting glucose: 111 mg/dL (10/9/2023)  A1C: 6.8 (8/7/2023)      Cholesterol Screening Once every 5 years if you don't have a lipid disorder. May order more often  based on risk factors. Lipid panel: 10/12/2022         Other Preventive Screenings Covered by Medicare:  Abdominal Aortic Aneurysm (AAA) Screening: covered once if your at risk. You're considered to be at risk if you have a family history of AAA or a male between the age of 65-75 who smoking at least 100 cigarettes in your lifetime.  Lung Cancer Screening: covers low dose CT scan once per year if you meet all of the following conditions: (1) Age 55-77; (2) No signs or symptoms of lung cancer; (3) Current smoker or have quit smoking within the last 15 years; (4) You have a tobacco smoking history of at least 20 pack years (packs per day x number of years you smoked); (5) You get a written order from a healthcare provider.  Glaucoma Screening: covered annually if you're considered high risk: (1) You have diabetes OR (2) Family history of glaucoma OR (3)  aged 50 and older OR (4)  American aged 65 and older  Osteoporosis Screening: covered every 2 years if you meet one of the following conditions: (1) Have a vertebral abnormality; (2) On glucocorticoid therapy for more than 3 months; (3) Have primary hyperparathyroidism; (4) On osteoporosis medications and need to assess response to drug therapy.  HIV Screening: covered annually if you're between the age of 15-65. Also covered annually if you are younger than 15 and older than 65 with risk factors for HIV infection. For pregnant patients, it is covered up to 3 times per pregnancy.    Immunizations:  Immunization Recommendations   Influenza Vaccine Annual influenza vaccination during flu season is recommended for all persons aged >= 6 months who do not have contraindications   Pneumococcal Vaccine   * Pneumococcal conjugate vaccine = PCV13 (Prevnar 13), PCV15 (Vaxneuvance), PCV20 (Prevnar 20)  * Pneumococcal polysaccharide vaccine = PPSV23 (Pneumovax) Adults 19-63 yo with certain risk factors or if 65+ yo  If never received any pneumonia vaccine:  recommend Prevnar 20 (PCV20)  Give PCV20 if previously received 1 dose of PCV13 or PPSV23   Hepatitis B Vaccine 3 dose series if at intermediate or high risk (ex: diabetes, end stage renal disease, liver disease)   Respiratory syncytial virus (RSV) Vaccine - COVERED BY MEDICARE PART D  * RSVPreF3 (Arexvy) CDC recommends that adults 60 years of age and older may receive a single dose of RSV vaccine using shared clinical decision-making (SCDM)   Tetanus (Td) Vaccine - COST NOT COVERED BY MEDICARE PART B Following completion of primary series, a booster dose should be given every 10 years to maintain immunity against tetanus. Td may also be given as tetanus wound prophylaxis.   Tdap Vaccine - COST NOT COVERED BY MEDICARE PART B Recommended at least once for all adults. For pregnant patients, recommended with each pregnancy.   Shingles Vaccine (Shingrix) - COST NOT COVERED BY MEDICARE PART B  2 shot series recommended in those 19 years and older who have or will have weakened immune systems or those 50 years and older     Health Maintenance Due:      Topic Date Due   • Hepatitis C Screening  Completed   • Colorectal Cancer Screening  Discontinued     Immunizations Due:      Topic Date Due   • COVID-19 Vaccine (4 - 2023-24 season) 09/01/2023     Advance Directives   What are advance directives?  Advance directives are legal documents that state your wishes and plans for medical care. These plans are made ahead of time in case you lose your ability to make decisions for yourself. Advance directives can apply to any medical decision, such as the treatments you want, and if you want to donate organs.   What are the types of advance directives?  There are many types of advance directives, and each state has rules about how to use them. You may choose a combination of any of the following:  Living will:  This is a written record of the treatment you want. You can also choose which treatments you do not want, which to limit,  and which to stop at a certain time. This includes surgery, medicine, IV fluid, and tube feedings.   Durable power of  for healthcare (DPAHC):  This is a written record that states who you want to make healthcare choices for you when you are unable to make them for yourself. This person, called a proxy, is usually a family member or a friend. You may choose more than 1 proxy.  Do not resuscitate (DNR) order:  A DNR order is used in case your heart stops beating or you stop breathing. It is a request not to have certain forms of treatment, such as CPR. A DNR order may be included in other types of advance directives.  Medical directive:  This covers the care that you want if you are in a coma, near death, or unable to make decisions for yourself. You can list the treatments you want for each condition. Treatment may include pain medicine, surgery, blood transfusions, dialysis, IV or tube feedings, and a ventilator (breathing machine).  Values history:  This document has questions about your views, beliefs, and how you feel and think about life. This information can help others choose the care that you would choose.  Why are advance directives important?  An advance directive helps you control your care. Although spoken wishes may be used, it is better to have your wishes written down. Spoken wishes can be misunderstood, or not followed. Treatments may be given even if you do not want them. An advance directive may make it easier for your family to make difficult choices about your care.       © Copyright [employer] 2018 Information is for End User's use only and may not be sold, redistributed or otherwise used for commercial purposes. All illustrations and images included in CareNotes® are the copyrighted property of [employer] or [employer]

## 2024-03-21 ENCOUNTER — TELEPHONE (OUTPATIENT)
Dept: FAMILY MEDICINE CLINIC | Facility: CLINIC | Age: 79
End: 2024-03-21

## 2024-03-21 NOTE — TELEPHONE ENCOUNTER
Patient called asking to come in the office for stiches removal on his scalp he needed them removed tomorrow. But I advised him that he could go back to the place that placed them since we do not have a provider in the office tomorrow.

## 2024-04-29 ENCOUNTER — TELEPHONE (OUTPATIENT)
Age: 79
End: 2024-04-29

## 2024-04-29 ENCOUNTER — OFFICE VISIT (OUTPATIENT)
Dept: FAMILY MEDICINE CLINIC | Facility: CLINIC | Age: 79
End: 2024-04-29
Payer: COMMERCIAL

## 2024-04-29 VITALS
HEIGHT: 63 IN | WEIGHT: 115.4 LBS | TEMPERATURE: 97.2 F | HEART RATE: 60 BPM | SYSTOLIC BLOOD PRESSURE: 130 MMHG | BODY MASS INDEX: 20.45 KG/M2 | OXYGEN SATURATION: 99 % | DIASTOLIC BLOOD PRESSURE: 60 MMHG

## 2024-04-29 DIAGNOSIS — F51.01 PRIMARY INSOMNIA: Primary | ICD-10-CM

## 2024-04-29 PROCEDURE — 1160F RVW MEDS BY RX/DR IN RCRD: CPT | Performed by: FAMILY MEDICINE

## 2024-04-29 PROCEDURE — G2211 COMPLEX E/M VISIT ADD ON: HCPCS | Performed by: FAMILY MEDICINE

## 2024-04-29 PROCEDURE — 99213 OFFICE O/P EST LOW 20 MIN: CPT | Performed by: FAMILY MEDICINE

## 2024-04-29 PROCEDURE — 3075F SYST BP GE 130 - 139MM HG: CPT | Performed by: FAMILY MEDICINE

## 2024-04-29 PROCEDURE — 1159F MED LIST DOCD IN RCRD: CPT | Performed by: FAMILY MEDICINE

## 2024-04-29 PROCEDURE — 3078F DIAST BP <80 MM HG: CPT | Performed by: FAMILY MEDICINE

## 2024-04-29 RX ORDER — MIRTAZAPINE 7.5 MG/1
7.5 TABLET, FILM COATED ORAL
Qty: 90 TABLET | Refills: 1 | Status: SHIPPED | OUTPATIENT
Start: 2024-04-29

## 2024-04-29 NOTE — PROGRESS NOTES
Assessment/Plan:    Primary insomnia  Per chart review patient has tried Ativan, Amitriptyline, Ambien and Trazodone. Will start trial of Mirtazepine 7.5mg QHS PRN. Prefer to avoid benzodiazepines given that patient is a fall risk and was recently seen in the hospital for a fall. Referral to sleep medicine placed for re-evaluation if no improvement  -     mirtazapine (REMERON) 7.5 MG tablet; Take 1 tablet (7.5 mg total) by mouth daily at bedtime  -     Ambulatory Referral to Sleep Medicine; Future          Subjective:      Patient ID: Elder Haro is a 79 y.o. male.    HPI  Elder Haro is a very pleasant 79 year old male with a past medical history of hypertension, CHF, CKD, type 2 diabetes mellitus and Parkinson's disease who presents today with a chief complaint of insomnia. Patient states that he has tried several different medications and yesterday he tried taking melatonin however he woke up with a terrible headache. Per chart review he was seen by Sleep medicine starting in 2018 and had been tried on several medications including atvian which was discontinued due to increased of fall as well as Amitriptyline, Trazodone, Hydroxyzine, Ambien but with little improvement. At one point he was started on Mirtazapine however patient does not remember taking the medication      The following portions of the patient's history were reviewed and updated as appropriate: allergies, current medications, past family history, past medical history, past social history, past surgical history, and problem list.    Review of Systems   Constitutional: Negative.    HENT: Negative.     Eyes: Negative.    Respiratory: Negative.     Cardiovascular: Negative.    Gastrointestinal: Negative.    Musculoskeletal: Negative.    Skin: Negative.    Neurological: Negative.    Psychiatric/Behavioral:  Positive for sleep disturbance.          Objective:      /60 (BP Location: Right arm, Patient Position: Sitting, Cuff Size:  "Adult)   Pulse 60   Temp (!) 97.2 °F (36.2 °C) (Temporal)   Ht 5' 3.39\" (1.61 m)   Wt 52.3 kg (115 lb 6.4 oz)   SpO2 99%   BMI 20.19 kg/m²          Physical Exam  Constitutional:       General: He is not in acute distress.     Appearance: He is not ill-appearing.   HENT:      Head: Normocephalic and atraumatic.   Eyes:      General:         Right eye: No discharge.         Left eye: No discharge.      Extraocular Movements: Extraocular movements intact.   Cardiovascular:      Rate and Rhythm: Normal rate.   Pulmonary:      Effort: Pulmonary effort is normal. No respiratory distress.   Neurological:      General: No focal deficit present.      Mental Status: He is alert.   Psychiatric:         Mood and Affect: Mood normal.         Behavior: Behavior normal.           "

## 2024-04-29 NOTE — TELEPHONE ENCOUNTER
Patient is having difficulty sleeping. Patient has tried over the counter melatonin and feels that it leaves him groggy. Patient is looking for some advice. Patient declined visits on 4/29 and 4/30 so an appointment was made for 5/8/24 with Dr. Park. Thank you.

## 2024-05-09 ENCOUNTER — TELEPHONE (OUTPATIENT)
Age: 79
End: 2024-05-09

## 2024-05-09 NOTE — TELEPHONE ENCOUNTER
Patient called the RX Refill Line. Message is being forwarded to the office.     Patient is requesting pt called today in regards to a pain medication - he normally gets after eye dr appointments due to a lot of pain he has after- he did not remember the names of the medications but states that they were not strong enough. I see on chart he once for percocet and tramadol for Macular Degeneration so I was unsure if those were correct.  Please advise - [facility] [place].     Please contact patient at

## 2024-05-10 ENCOUNTER — TELEPHONE (OUTPATIENT)
Age: 79
End: 2024-05-10

## 2024-05-10 DIAGNOSIS — H35.3230 BILATERAL EXUDATIVE AGE-RELATED MACULAR DEGENERATION, UNSPECIFIED STAGE (HCC): Primary | ICD-10-CM

## 2024-05-10 RX ORDER — OXYCODONE AND ACETAMINOPHEN 2.5; 325 MG/1; MG/1
1 TABLET ORAL EVERY 8 HOURS PRN
Qty: 6 TABLET | Refills: 0 | Status: SHIPPED | OUTPATIENT
Start: 2024-05-10 | End: 2024-05-12

## 2024-05-10 NOTE — TELEPHONE ENCOUNTER
Patient called and stated he would like pcp to review his records from [facility]. Patient would like to see another eye doctor and he is looking for recommendations. Please advise. Patient provided the phone number to [facility] eye specialists [phone]

## 2024-05-13 ENCOUNTER — TELEPHONE (OUTPATIENT)
Age: 79
End: 2024-05-13

## 2024-05-13 NOTE — TELEPHONE ENCOUNTER
Outgoing call to the CGMary Kate to provide the contact information for [facility]-[phone] to the CG. No answer to the call. I did leave the information on the CG voicemail. I also informed the CG of the patient's fall.

## 2024-05-13 NOTE — TELEPHONE ENCOUNTER
I see records from December but nothing more recent than that. If he's looking for an alternative then he can try  the [facility].

## 2024-05-13 NOTE — TELEPHONE ENCOUNTER
Patient contacted the office back. I did inform that a message was left on Mary Kate's phone with information for the eye doctor. Also provided the information to patient as well. Patient is also asking if there is a doctor in the [facility] that specializes in Parkinson's as he was initially referred to a doctor in [place] but it would be more convenient to see someone closer. Asking if a name/number could be provided for him.

## 2024-05-13 NOTE — TELEPHONE ENCOUNTER
Patient called to report that he needs a new MD for his eyes. Dr. Park has recommended [facility] for Sight. Elder had a fall while we were on the phone. I spoke with the CG Cheo who stated that the patient was not harmed. I wanted to give the eye doctor information to the CG but Cheo asked me to call the patient's niece, Mary Kate.

## 2024-05-14 NOTE — TELEPHONE ENCOUNTER
Patient calling to ask if referral to Parkinson's specialty has been written. I advised it was not done yet, but we will call him as soon as it is finished so he can set up an evaluation. He thanks us for our help!

## 2024-05-14 NOTE — TELEPHONE ENCOUNTER
I spoke with the patient and he said he will give his eye doctor one more try and if he's not happy, then he will switch.     He would like to know who you recommend for his Parkinson's with [facility]?

## 2024-05-15 DIAGNOSIS — G20.A1 PARKINSON'S DISEASE, UNSPECIFIED WHETHER DYSKINESIA PRESENT, UNSPECIFIED WHETHER MANIFESTATIONS FLUCTUATE: Primary | ICD-10-CM

## 2024-05-15 NOTE — TELEPHONE ENCOUNTER
This was already taken care of.  I spoke with the patient much earlier today and noted this.  He is aware.

## 2024-05-15 NOTE — TELEPHONE ENCOUNTER
Dr Trae Nieves deals with Parkinson's. I can place a referral to [facility]'s Neurology if he wants to switch

## 2024-05-23 NOTE — PROGRESS NOTES
Called patient and informed him that this is just a recommendation and we can't give the patient a discount code. Patient voiced understanding.    Daily Note     Today's date: 2020  Patient name: Shikha Fields  : 1945  MRN: [id number]  Referring provider: Julianna Shelby MD  Dx:   Encounter Diagnosis     ICD-10-CM    1  Pain in right hip M25 551    2  Chronic left shoulder pain M25 512     G89 29        Start Time: 1038  Stop Time: 1130  Total time in clinic (min): 52 minutes    Subjective: Patient noted the shoulder pain is gone and the hip is getting better  Objective: See treatment diary below      Assessment: Patient performed exercises with increased resistance  Patient continues to demonstrate improvements with strength and function 2* less pain when performing ADLs  PT continues to note improvements post joint mobility  Patient would benefit from continued PT to allow the patient to return to his PLOF  Plan: Continue per plan of care        Precautions: Parkinson's, heart attack in , HTN, hearing difficulty, macular degeneration       Manual   2/ 2/6      Pelvic rocking    MW MW MW MW      STM to right QL             Trigger point release L upper trap             PA to TP of L 3-5 on R    Grade  III-IV  MW Grade  III-IV  MW Grade  III-IV  MW Grade  III-IV  MW                     *  Exercise Diary   2/4 2/6      X-ride  10' 10' 10' 10' 10' 10'      Lower trunk rotation 20x 5x15"ea 5x15" ea 5x15" ea 5x15"ea 5x15" ea 5x15" ea      Bridges  10x 5" 2x10  5" hold 2x10  5" hold 2x10  5" hold 2x10  5" hold  BTB 3x10  5" hold 3x10  5" hold  BTB      Transverse ab contract  nv nv 2x10  5" hold 2x10  5" hold        TA contract with marches   nv  nv         Clamshells     x15ea x15ea  x20ea      Hip abd  GTB  2x10  5" hold GTB  2x10  5" hold BTB  2x10  5" hold BTB  2x10  5" hold BTB  3x10  5" hold BTB  3x10  5" hold      Hip add  2x10  5" hold 2x10  5"  hold 2x10  5" hold 3x10  5" hold 3x10  5" hold 3x10  5" hold      Upper trap stretch 20"x3            Chin tucks  2x10  5" hold 2x10  5" hold 2x10  5" hold 2x10  5" hold  cues 3x10  5" hold 3x10  5" hold      Open books  5x15"ea 5x15"ea 5x15" ea 5x15"ea 5x15" ea 5x15" ea      Piriformis stretch  5x15" ea 5x15" ea 5x15" ea 5x15"ea 5x15" ea 5x15" ea      Serratus punch  2x10 2x10 2x10 2x10 3x10 2x10  2#      Scapular retraction seated  2x10  5" hold 2x10  5" hold 2x10  5" hold 2x10  5" hold 3x10  5" hold 3x10  5" hold      No monies      RTB  2x10 RTB  2x10      Leg press      DL  3x10  40# DL  3x10  40#                                                              Modalities

## 2024-05-31 ENCOUNTER — TELEPHONE (OUTPATIENT)
Age: 79
End: 2024-05-31

## 2024-05-31 NOTE — TELEPHONE ENCOUNTER
"Pt called requesting information on his appt w/Cardiology. There was no appt on pt's appt desk. Pt was insisting that he was scheduled with Cardiology \"ac[facility] from the [facility]\", based on pt's chart, pt is seeing [facility]. Pt provided with the phone # for [facility]. [phone]   Pt thanked us for our assistance.  "

## 2024-06-06 ENCOUNTER — TELEPHONE (OUTPATIENT)
Age: 79
End: 2024-06-06

## 2024-06-06 NOTE — TELEPHONE ENCOUNTER
Pt called [facility] Valentin regarding appts. Pt not establsihed here. States has an appt tomorrow with our associate that called about his kidney labs.    Advised patient he has an appt with [facility] in [place], provided date and of appt, name of provider.    Advise labs for kidney could be his nephrologist, Dr Danny Barron, [facility].    Advised patient again that he [facility]. Started to offer phone number to provider and patient hung up.

## 2024-06-12 NOTE — ASSESSMENT & PLAN NOTE
Counseling for sleep hygiene Initial date of service: 5/6/24    Diagnoses and all orders for this visit:    Spinal stenosis of lumbar region with neurogenic claudication    Segmental dysfunction of sacral region    Muscle spasm    Segmental dysfunction of lumbar region    Segmental dysfunction of thoracic region    Segmental dysfunction of cervical region    Cervical spondylosis    Pt suffered mild exacerbation but responded to tx with reduced pain and increased ROM. Overall improving with more mobility    TREATMENT: 33380,32476  Ther-ex: IASTM; discussed post procedure soreness and/or ecchymosis for up to 36 hrs, applied to affected mm hypertonicities; supine hamstring stretch, supine gluteal stretch, single knee to chest stretch, upper trap stretch, transitional mvmt education, abdominal bracing; greater than 15 min spent performing above mentioned ther-ex to improve ROM/flexibility. Cervical supine graded mobilization and traction, Thoracic mobilization/manipulation: prone P-A mob; Lumbar mobilization/manipulation: diversified side laying graded HVLA, flexion-traction; SIJ Manipulation/Mobilization: R/L SIJ HVLA - long axis distraction, mederos drop table maneuver to affected SIJ    HPI:  Kinza Meza is a 51 y.o. female  Chief Complaint   Patient presents with    Neck Pain     Very stiff bilateral a 4     Back Pain     Low back a 5 mid and lower back shooting down left leg      Pt presents for tx for exacerbation of chronic neck/back pain. Pt has had back and neck surgery. Pt was utilizing cane and now using walker occasionally. 2022 MRI demonstrates multilevel degenerative changes of postsurgical lumbar spine status post L1-L2 posterior spinal fixation with varying degrees of canal stenosis (moderate L4-L5) and foraminal narrowing (mild left L1-L2 and left L2-L3 ). 2024 fl/ext xrays unremarkable for instability. 2021 C/S MRI demonstrates s/p ACDF from C4 to C7. Multilevel degenerative spondylosis as described. Possible ossification  of the posterior longitudinal ligament at C2-3 and C6-7      Neck Pain   This is a chronic problem. The current episode started more than 1 year ago. The problem occurs constantly. The problem has been waxing and waning. The pain is present in the left side, midline and right side. The quality of the pain is described as aching. The symptoms are aggravated by bending, position, stress and twisting. The pain is Worse during the day.   Back Pain  This is a chronic problem. The current episode started more than 1 year ago. The problem occurs constantly. The problem has been waxing and waning since onset. The pain is present in the gluteal, lumbar spine, sacro-iliac and thoracic spine. The quality of the pain is described as aching, burning and stabbing. The pain radiates to the left thigh, left knee and right thigh. Pain scale: 4-9/10. The pain is Worse during the day. The symptoms are aggravated by standing, twisting and bending (walking, laying supine, transitional mvmts; palliative includes sitting, massage). Pertinent negatives include no bladder incontinence or bowel incontinence. Risk factors include lack of exercise.     Past Medical History:   Diagnosis Date    ADD (attention deficit disorder)     Allergic rhinitis     ALS (amyotrophic lateral sclerosis) (Formerly [facility]) 11/22    Per neurological surgeon    Anemia 12/2021    Anxiety     Arthritis     Bipolar disorder (Formerly [facility])     Cervical disc disorder with radiculopathy of cervical region     Chronic depression     Chronic kidney disease     Stage 1    Chronic pain disorder     Cluster headache     Colitis     Last assessed - 11/25/14    Colon polyp     Concussion 2018    Condyloma acuminatum     Last assessed - 3/19/14    CTS (carpal tunnel syndrome) 2001    Depression     Diabetes mellitus ([facility]ly [facility])     Last assessed - 11/25/14    Diabetic nephropathy ([facility]) 2001    Diabetic neuropathy ([facility]ly [facility])     Diabetic retinopathy (Formerly [facility]) 2001    Difficulty walking 07/22    Fibromyalgia      Fibromyalgia, primary     GERD (gastroesophageal reflux disease)     Head injury 18    Fall    Headache(784.0) 1977    History of transfusion 2021    HTN (hypertension)     Last assessed - 14    Hyperlipidemia 2019    Hypertension     Intervertebral disc disorder with radiculopathy of lumbar region     Lupus (HCC)     Rhuematologist-Sheela Dasilva, [place]    Migraine     Miscarriage 1988    ,     Obesity 1980    Obesity, unspecified     Open wound of back 2023    Opioid abuse, in remission (HCC) 2022    Only while in hospital and for about 10 days once home instead of one week.    Osteoporosis     Peripheral neuropathy     Postoperative wound infection 2021    Stop not healed    Preeclampsia     Last assessed - 14    Rheumatoid arthritis (HCC)     Wears dentures       Past Surgical History:   Procedure Laterality Date    APPENDECTOMY      Last assessed - 14    BARIATRIC SURGERY  2016    CARPAL TUNNEL RELEASE      CATARACT EXTRACTION      CERVICAL FUSION N/A 2019    Procedure: Anterior cervical discectomy w fusion C4-5, C5-6, C6-7; allograft and neuromonitoring;  Surgeon: Jose Gomez MD;  Location: [facility];  Service: Orthopedics     SECTION      Last assessed - 14    COLONOSCOPY      COLONOSCOPY      HEMORRHOID SURGERY      HERNIA REPAIR      Last assessed - 14    LUMBAR FUSION N/A 2021    Procedure: L1/2 laminectomy, discectomy with L1/2 MIS posterior fixation using neuromonitoring and neuronavigation;  Surgeon: Suhas Akbar MD;  [facility]: [facility];  Service: Neurosurgery    LUMBAR WOUND EXPLORATION Bilateral 2021    Procedure: Lumbar wound washout, debridement and intraoperative cultures;  Surgeon: Suhas Akbar MD;  [facility]: [facility];  Service: Neurosurgery    MAMMO (HISTORICAL)  2016    NJ DEBRIDEMENT BONE 1ST 20 SQ CM/< N/A 2023    Procedure: SURGICAL PREPARATION OF BACK  WOUND AND LOCAL FLAP,  PREVENA PLACEMENT;  Surgeon: Ady Rocha MD;  Location:  MAIN OR;  Service: Plastics    WOOD-EN-Y PROCEDURE      ULNAR TUNNEL RELEASE      VAC DRESSING APPLICATION Bilateral 12/22/2021    Procedure: APPLICATION VAC DRESSING;  Surgeon: Elbert Hawley MD;  [facility]: [facility];  Service: General    VAC DRESSING APPLICATION N/A 12/25/2021    Procedure: APPLICATION VAC DRESSING ABDOMEN/TRUNK;  Surgeon: Mani Ross DO;  Location: [facility];  Service: General    VENTRAL HERNIA REPAIR       The following portions of the patient's history were reviewed and updated as appropriate: allergies, past family history, past medical history, past social history, past surgical history, and problem list.  Review of Systems   Gastrointestinal:  Negative for bowel incontinence.   Genitourinary:  Negative for bladder incontinence.   Musculoskeletal:  Positive for back pain and neck pain.     Physical Exam  Neck:        Comments: Pnful and limited in Brot, Blf  Musculoskeletal:      Cervical back: Pain with movement and muscular tenderness present. Decreased range of motion.      Thoracic back: Spasms and tenderness present. Decreased range of motion.      Lumbar back: Spasms and tenderness present. Decreased range of motion. Negative right straight leg raise test and negative left straight leg raise test.        Back:       Comments: Pnful and limited in Brot, Blf, Ext centralizes    Lymphadenopathy:      Cervical: No cervical adenopathy.   Skin:     General: Skin is warm and dry.   Neurological:      Mental Status: She is alert and oriented to person, place, and time.      Gait: Gait is intact.   Psychiatric:         Mood and Affect: Mood and affect normal.         Behavior: Behavior normal.       SOFT TISSUE ASSESSMENT Hypertonicity and tenderness palpated B upper traps, B SCM, B T10-S1 erector spinae, glute med/min, QL, hamstring JOINT RESTRICTIONS: C4/5, T1/2, T10-S1 and R/L SIJ    Return in about 1 week (around  6/19/2024) for Next scheduled follow up.

## 2024-07-29 ENCOUNTER — TELEPHONE (OUTPATIENT)
Age: 79
End: 2024-07-29

## 2024-07-29 NOTE — TELEPHONE ENCOUNTER
[facility] scheduled for tomorrow [facility] When I went to schedule him it said he needed a referral. Please review and advise. [phone] thank you.

## 2024-07-30 ENCOUNTER — OFFICE VISIT (OUTPATIENT)
Dept: FAMILY MEDICINE CLINIC | Facility: CLINIC | Age: 79
End: 2024-07-30
Payer: COMMERCIAL

## 2024-07-30 VITALS
SYSTOLIC BLOOD PRESSURE: 122 MMHG | TEMPERATURE: 97.6 F | HEART RATE: 72 BPM | DIASTOLIC BLOOD PRESSURE: 64 MMHG | HEIGHT: 63 IN | BODY MASS INDEX: 21.76 KG/M2 | OXYGEN SATURATION: 97 % | WEIGHT: 122.8 LBS

## 2024-07-30 DIAGNOSIS — F33.1 MODERATE RECURRENT MAJOR DEPRESSION (HCC): Primary | ICD-10-CM

## 2024-07-30 PROCEDURE — G2211 COMPLEX E/M VISIT ADD ON: HCPCS | Performed by: FAMILY MEDICINE

## 2024-07-30 PROCEDURE — 1159F MED LIST DOCD IN RCRD: CPT | Performed by: FAMILY MEDICINE

## 2024-07-30 PROCEDURE — 3078F DIAST BP <80 MM HG: CPT | Performed by: FAMILY MEDICINE

## 2024-07-30 PROCEDURE — 3725F SCREEN DEPRESSION PERFORMED: CPT | Performed by: FAMILY MEDICINE

## 2024-07-30 PROCEDURE — 99214 OFFICE O/P EST MOD 30 MIN: CPT | Performed by: FAMILY MEDICINE

## 2024-07-30 PROCEDURE — 1160F RVW MEDS BY RX/DR IN RCRD: CPT | Performed by: FAMILY MEDICINE

## 2024-07-30 PROCEDURE — 3074F SYST BP LT 130 MM HG: CPT | Performed by: FAMILY MEDICINE

## 2024-07-30 RX ORDER — ESCITALOPRAM OXALATE 5 MG/1
5 TABLET ORAL DAILY
Qty: 30 TABLET | Refills: 5 | Status: SHIPPED | OUTPATIENT
Start: 2024-07-30

## 2024-07-30 NOTE — PATIENT INSTRUCTIONS
- Please start taking lexapro 5mg daily.  - [facility] will [facility] about [facility] with [facility]  - I have placed a referral for our  to contact you regarding day programs    - I would like to see you back in 4 weeks   
no

## 2024-07-31 ENCOUNTER — PATIENT OUTREACH (OUTPATIENT)
Dept: CASE MANAGEMENT | Facility: OTHER | Age: 79
End: 2024-07-31

## 2024-07-31 NOTE — PROGRESS NOTES
"Received referral from patients PCP Brittney Park MD requesting that [facility] outreach patient and assess for needs. Per chart review, patient was diagnosed with moderate recurrent major depression. [facility] get [facility] appointment with [facility] as his current appointment is scheduled for September. Patient previously cancelled 2 past appointments and unable to get him in sooner but was informed that they would reach out to patient to possibly schedule new appointment. Patient interested in day programs.     [facility] spoke to patient, introduced role and reason for call. Patient reported doing okay. Stated that he was interested in finding local meetings with other [place] veterans and day programs that he can attend and socialize with others. Stated that he heard about \"[employer] and [employer]versations\" for veterans but it is in [facility] and wondering if there is something closer to him. Patient requesting that [facility] call him and verbally provide the resources as he has poor eye sight and unable to read mail or email very well.    [facility] contacted [facility] to inquire about local resources available for patient. Spoke with Jaclyn who will speak with her director as they are more educated about local groups for veterans. [facility] awaiting for return call.    "

## 2024-08-01 ENCOUNTER — PATIENT OUTREACH (OUTPATIENT)
Dept: CASE MANAGEMENT | Facility: OTHER | Age: 79
End: 2024-08-01

## 2024-08-01 NOTE — PROGRESS NOTES
"Received return call from [facility] after work hours and did not leave a message.    SWCM returning call and was informed that Jaclyn will call [facility] back in a few minutes. Awaiting for return call.    Addendum:  Received return call from Jaclyn at [facility]. Informed SW that they hold a \"Cross Talk\" once a month which includes different organizations informing veterans about local events and other social gatherings. Cross talk is held at the [facility] office with next meeting scheduling for 08/14 @ 10am.    SWCM spoke to patient. Patient was provided contact information for [facility] as well as next Cross Talk meeting date and time. Patient inquired about transportation and reported that he has utilized [employer] services in the past. SWCM encouraged him to utilize [employer] or contact [facility] to inquire if they offer transportation for veterans to get to meetings. Patient understood and thankful for information.    Patient reported no other needs. Patient has contact information for [facility] and [facility]. SW will close referral and available if needed via new order.      "

## 2024-08-15 ENCOUNTER — TELEPHONE (OUTPATIENT)
Dept: SLEEP CENTER | Facility: CLINIC | Age: 79
End: 2024-08-15

## 2024-08-16 ENCOUNTER — TELEPHONE (OUTPATIENT)
Age: 79
End: 2024-08-16

## 2024-08-16 NOTE — TELEPHONE ENCOUNTER
Patient calling to ask for a refill of his pain medication (percocet ?) was not sure of the name. Had an eye procedure yesterday and took his last 2 pain pills. States he has a procedure on the other eye next week. States the procedure is very painful without a pain pill. Please follow up with patient when it is filled so he can pick it up. He would like his script called to [facility] in [place].

## 2024-08-19 NOTE — TELEPHONE ENCOUNTER
Patient following up on his call from Friday. Requesting RF of Endocet (percocet?). Has another eye procedure coming up on Friday as well. Please follow up with patient and provide status update on this request.

## 2024-08-20 ENCOUNTER — RA CDI HCC (OUTPATIENT)
Dept: OTHER | Facility: HOSPITAL | Age: 79
End: 2024-08-20

## 2024-08-20 NOTE — TELEPHONE ENCOUNTER
Pt called today looking for a refill on a medication. Pt spelled out e.n.d.o.c.a.t.e    Unable to find this medication on pt's medication list.    Warm transferred to [facility] with [facility] Rx line to further assist patient.

## 2024-08-23 ENCOUNTER — TELEPHONE (OUTPATIENT)
Age: 79
End: 2024-08-23

## 2024-08-23 NOTE — TELEPHONE ENCOUNTER
Reason for call:   [x] Refill   [] Prior Auth  [] Other: Patient requesting Endocet 3 mg (not on active list), on file was oxyCODONE-acetaminophen (Percocet) 2.5-325 MG per tablet    Office:   [x] PCP/Provider - [facility]  [] Specialty/Provider -     Medication: Endocet 3 mg    Dose/Frequency: not listed    Quantity: not listed    Pharmacy:[id number] - [place], [place] - [id number] [facility] [[id number]]     Does the patient have enough for 3 days?   [x] Yes   [] No - Send as HP to POD

## 2024-08-23 NOTE — TELEPHONE ENCOUNTER
Pt should be getting medication from surgeon for surgical pain, he can ask them when he has the procedure

## 2024-08-26 NOTE — TELEPHONE ENCOUNTER
I spoke to patent he states that he takes this medication when he has an eye apt. He said that the pain is really strong. Please advise

## 2024-08-27 DIAGNOSIS — H35.3230 BILATERAL EXUDATIVE AGE-RELATED MACULAR DEGENERATION, UNSPECIFIED STAGE (HCC): Primary | ICD-10-CM

## 2024-08-27 RX ORDER — OXYCODONE AND ACETAMINOPHEN 2.5; 325 MG/1; MG/1
1 TABLET ORAL EVERY 8 HOURS PRN
Qty: 6 TABLET | Refills: 0 | Status: SHIPPED | OUTPATIENT
Start: 2024-08-27

## 2024-08-27 NOTE — TELEPHONE ENCOUNTER
Patient advised that medication was sent to the pharmacy and not to take if not needed because of kidney failure.

## 2024-08-28 ENCOUNTER — OFFICE VISIT (OUTPATIENT)
Dept: FAMILY MEDICINE CLINIC | Facility: CLINIC | Age: 79
End: 2024-08-28
Payer: COMMERCIAL

## 2024-08-28 VITALS
SYSTOLIC BLOOD PRESSURE: 128 MMHG | WEIGHT: 119 LBS | OXYGEN SATURATION: 98 % | HEIGHT: 63 IN | BODY MASS INDEX: 21.09 KG/M2 | DIASTOLIC BLOOD PRESSURE: 72 MMHG | HEART RATE: 73 BPM | TEMPERATURE: 97.8 F

## 2024-08-28 DIAGNOSIS — F33.1 MODERATE RECURRENT MAJOR DEPRESSION (HCC): ICD-10-CM

## 2024-08-28 PROCEDURE — 99213 OFFICE O/P EST LOW 20 MIN: CPT | Performed by: FAMILY MEDICINE

## 2024-08-28 PROCEDURE — G2211 COMPLEX E/M VISIT ADD ON: HCPCS | Performed by: FAMILY MEDICINE

## 2024-08-28 RX ORDER — ESCITALOPRAM OXALATE 10 MG/1
10 TABLET ORAL DAILY
Qty: 90 TABLET | Refills: 0 | Status: SHIPPED | OUTPATIENT
Start: 2024-08-28

## 2024-08-28 NOTE — PROGRESS NOTES
Ambulatory Visit  Name: Elder Haro      : 1945      MRN: [id number]  Encounter Provider: Brittney Park MD  Encounter Date: 2024   Encounter department: [facility]    Assessment & Plan   1. Moderate recurrent major depression (HCC)  Comments:  Increase lexapro to 10mg daily; [facility]. Follow up in 3 months  Orders:  -     escitalopram (LEXAPRO) 10 mg tablet; Take 1 tablet (10 mg total) by mouth daily       History of Present Illness     Elder Haro is a very pleasant 79 year old male who presents today for a follow up. At previous office visit he was started on lexapro for depression. He is currently on the 5mg dose which is he is tolerating well although is not noticing much of a difference. Social work did reach out to patient regarding day programs and he states that he will be reaching out to [facility] about this.     Review of Systems   Constitutional: Negative.    HENT: Negative.     Eyes:  Positive for visual disturbance.   Respiratory: Negative.     Cardiovascular: Negative.    Gastrointestinal: Negative.    Musculoskeletal: Negative.    Skin: Negative.    Neurological: Negative.    Psychiatric/Behavioral: Negative.       Current Outpatient Medications on File Prior to Visit   Medication Sig Dispense Refill    Alogliptin Benzoate 25 MG TABS Take 0.5 tablets by mouth daily      carbidopa-levodopa (SINEMET CR)  mg TBCR per ER tablet Take 1 tablet by mouth 3 (three) times a day      clotrimazole-betamethasone (LOTRISONE) 1-0.05 % cream Apply topically 2 (two) times a day 30 g 0    dextran 70-hypromellose (GENTEAL TEARS) 0.1-0.3 % ophthalmic solution as needed      Diclofenac Sodium (VOLTAREN) 1 % APPLY 4GM TO LOWER EXTREMITIES TOPICALLY TWICE A DAY **USE DOSING CARD** (DO NOT EXCEED 16 GRAMS DAILY TO ANY AFFECTED JOINT OF THE LOWER EXTREMITIES. DO NOT EXCEED 8 GRAMS DAILY TO ANY AFFECTED JOINT OF THE UPPER  EXTREMITIES. DO NOT EXCEED A TOTAL DOSE OF 32 GRAMS DAILY OVER ALL JOINTS)      Empagliflozin 25 MG TABS 12.5 mg      Entresto 24-26 MG TABS Take 1 tablet by mouth 2 (two) times a day      erythromycin (ILOTYCIN) ophthalmic ointment apply A 1 CM RIBBON OF OINTMENT INTO LOWER CONJUNCTIVAL SAC OF RI...  (REFER TO PRESCRIPTION NOTES).      fluticasone (FLONASE) 50 mcg/act nasal spray INSTILL 1 SPRAY IN NOSTRIL(S) TWICE A DAY FOR NASAL ALLERGIES IN EACH NOSTRIL      furosemide (LASIX) 40 mg tablet Take 40 mg by mouth daily      glucose blood test strip Test once daily and as needed 300 each 3    LORazepam (ATIVAN) 1 mg tablet Take 1 mg by mouth every 8 (eight) hours as needed for anxiety      metoprolol succinate (TOPROL-XL) 50 mg 24 hr tablet Take 50 mg by mouth      Multiple Vitamin (MULTIVITAMINS PO) TAKE ONE CAP/TAB BY MOUTH EVERY MORNING FOR SUPPLEMENTATION      oxyCODONE-acetaminophen (Percocet) 2.5-325 MG per tablet Take 1 tablet by mouth every 8 (eight) hours as needed for moderate pain Max Daily Amount: 3 tablets 6 tablet 0    simvastatin (ZOCOR) 80 mg tablet Take 80 mg by mouth      spironolactone (ALDACTONE) 25 mg tablet Take 1 tablet by mouth daily      valsartan (DIOVAN) 160 mg tablet Take 160 mg by mouth daily      [DISCONTINUED] escitalopram (LEXAPRO) 5 mg tablet Take 1 tablet (5 mg total) by mouth daily 30 tablet 5    aspirin (ECOTRIN LOW STRENGTH) 81 mg EC tablet 81 mg (Patient not taking: Reported on 4/29/2024)      naloxone (NARCAN) 4 mg/0.1 mL nasal spray Administer 1 spray into a nostril. If no response after 2-3 minutes, give another dose in the other nostril using a new spray. (Patient not taking: Reported on 7/30/2024) 1 each 1     No current facility-administered medications on file prior to visit.      Social History     Tobacco Use    Smoking status: Never    Smokeless tobacco: Never   Vaping Use    Vaping status: Never Used   Substance and Sexual Activity    Alcohol use: Yes     Alcohol/week:  "21.0 standard drinks of alcohol     Types: 14 Cans of beer, 7 Shots of liquor per week     Comment: occ    Drug use: No    Sexual activity: Not on file     Objective     /72 (BP Location: Left arm, Patient Position: Sitting, Cuff Size: Child)   Pulse 73   Temp 97.8 °F (36.6 °C) (Temporal)   Ht 5' 3.39\" (1.61 m)   Wt 54 kg (119 lb)   SpO2 98%   BMI 20.82 kg/m²     Physical Exam  Constitutional:       General: He is not in acute distress.     Appearance: He is not ill-appearing.   HENT:      Head: Normocephalic and atraumatic.   Eyes:      General:         Right eye: No discharge.         Left eye: No discharge.      Extraocular Movements: Extraocular movements intact.   Cardiovascular:      Rate and Rhythm: Normal rate.   Pulmonary:      Effort: Pulmonary effort is normal. No respiratory distress.   Neurological:      General: No focal deficit present.      Mental Status: He is alert.   Psychiatric:         Mood and Affect: Mood normal.         Behavior: Behavior normal.       Administrative Statements           "

## 2024-08-29 ENCOUNTER — OFFICE VISIT (OUTPATIENT)
Dept: SLEEP CENTER | Facility: CLINIC | Age: 79
End: 2024-08-29
Payer: COMMERCIAL

## 2024-08-29 VITALS
HEIGHT: 63 IN | SYSTOLIC BLOOD PRESSURE: 119 MMHG | BODY MASS INDEX: 20.55 KG/M2 | DIASTOLIC BLOOD PRESSURE: 70 MMHG | WEIGHT: 116 LBS

## 2024-08-29 DIAGNOSIS — F51.04 CHRONIC INSOMNIA: ICD-10-CM

## 2024-08-29 DIAGNOSIS — F51.01 PRIMARY INSOMNIA: ICD-10-CM

## 2024-08-29 DIAGNOSIS — G47.19 EXCESSIVE DAYTIME SLEEPINESS: Primary | ICD-10-CM

## 2024-08-29 PROCEDURE — 99203 OFFICE O/P NEW LOW 30 MIN: CPT | Performed by: PHYSICIAN ASSISTANT

## 2024-08-29 NOTE — PROGRESS NOTES
"        Progress Note - [facility]   Elder Haro 79 y.o. male   :1945, MRN: [id number]  2024          Follow Up Evaluation / Problem:     Insomnia  Parkinson's disease    Thank you for the opportunity of participating in the evaluation and care of this patient in the Sleep Clinic at [facility].      HPI: Elder Haro is a 79 y.o. year old male who presents today in follow-up of chronic insomnia.  He last saw Dr. Lyons in 2021 and was prescribed Remeron 7.5 mg to take 1 nightly at bedtime.  He was also instructed to wean off his lorazepam given his history of parkinson's and age, Dr. Lyons felt this would be a significant risk for falls and cognitive disorders.  He states he took the Remeron but developed \"weird dreams\" and frequent awakenings with the medication.  He then stopped taking the medication.  Previously, he has tried multiple medications to help with his sleep including melatonin, amitriptyline, trazodone, and Ambien.  Patient states he has had side effects with all these medications.  Most [facility] he states [facility].  He states he only slept about 2 hours and then felt foggy during the day.  He is no longer taking Ambien. Currently, he is taking lorazepam 1 mg 1/2 to 1 pill nightly.  He states he still has significant difficulty in falling asleep and has frequent awakenings.  He feels his Parkinson's symptoms definitely disrupt his sleep frequently.  He has an upcoming appointment with neurology in October.    Current sleep schedule is getting in bed at 8:30 PM taking lorazepam 1 mg 1/2 to 1 pill nightly.  He states he falls asleep around 12 AM and is woken by his Parkinson symptoms throughout the night.  He typically awakes for the day at 8 AM.  He denies napping.        Current Outpatient Medications:     Alogliptin Benzoate 25 MG TABS, Take 0.5 tablets by mouth " daily, Disp: , Rfl:     carbidopa-levodopa (SINEMET CR)  mg TBCR per ER tablet, Take 1 tablet by mouth 3 (three) times a day, Disp: , Rfl:     clotrimazole-betamethasone (LOTRISONE) 1-0.05 % cream, Apply topically 2 (two) times a day, Disp: 30 g, Rfl: 0    dextran 70-hypromellose (GENTEAL TEARS) 0.1-0.3 % ophthalmic solution, as needed, Disp: , Rfl:     Diclofenac Sodium (VOLTAREN) 1 %, APPLY 4GM TO LOWER EXTREMITIES TOPICALLY TWICE A DAY **USE DOSING CARD** (DO NOT EXCEED 16 GRAMS DAILY TO ANY AFFECTED JOINT OF THE LOWER EXTREMITIES. DO NOT EXCEED 8 GRAMS DAILY TO ANY AFFECTED JOINT OF THE UPPER EXTREMITIES. DO NOT EXCEED A TOTAL DOSE OF 32 GRAMS DAILY OVER ALL JOINTS), Disp: , Rfl:     Empagliflozin 25 MG TABS, 12.5 mg, Disp: , Rfl:     Entresto 24-26 MG TABS, Take 1 tablet by mouth 2 (two) times a day, Disp: , Rfl:     erythromycin (ILOTYCIN) ophthalmic ointment, apply A 1 CM RIBBON OF OINTMENT INTO LOWER CONJUNCTIVAL SAC OF RI...  (REFER TO PRESCRIPTION NOTES)., Disp: , Rfl:     escitalopram (LEXAPRO) 10 mg tablet, Take 1 tablet (10 mg total) by mouth daily, Disp: 90 tablet, Rfl: 0    fluticasone (FLONASE) 50 mcg/act nasal spray, INSTILL 1 SPRAY IN NOSTRIL(S) TWICE A DAY FOR NASAL ALLERGIES IN EACH NOSTRIL, Disp: , Rfl:     furosemide (LASIX) 40 mg tablet, Take 40 mg by mouth daily, Disp: , Rfl:     glucose blood test strip, Test once daily and as needed, Disp: 300 each, Rfl: 3    LORazepam (ATIVAN) 1 mg tablet, Take 1 mg by mouth every 8 (eight) hours as needed for anxiety, Disp: , Rfl:     metoprolol succinate (TOPROL-XL) 50 mg 24 hr tablet, Take 50 mg by mouth, Disp: , Rfl:     Multiple Vitamin (MULTIVITAMINS PO), TAKE ONE CAP/TAB BY MOUTH EVERY MORNING FOR SUPPLEMENTATION, Disp: , Rfl:     oxyCODONE-acetaminophen (Percocet) 2.5-325 MG per tablet, Take 1 tablet by mouth every 8 (eight) hours as needed for moderate pain Max Daily Amount: 3 tablets, Disp: 6 tablet, Rfl: 0    simvastatin (ZOCOR) 80 mg  "tablet, Take 80 mg by mouth, Disp: , Rfl:     spironolactone (ALDACTONE) 25 mg tablet, Take 1 tablet by mouth daily, Disp: , Rfl:     valsartan (DIOVAN) 160 mg tablet, Take 160 mg by mouth daily, Disp: , Rfl:     aspirin (ECOTRIN LOW STRENGTH) 81 mg EC tablet, 81 mg (Patient not taking: Reported on 4/29/2024), Disp: , Rfl:     naloxone (NARCAN) 4 mg/0.1 mL nasal spray, Administer 1 spray into a nostril. If no response after 2-3 minutes, give another dose in the other nostril using a new spray. (Patient not taking: Reported on 7/30/2024), Disp: 1 each, Rfl: 1    How likely are you to doze off or fall asleep in the following situations, in contrast to feeling just tired?  Sitting and reading: Would never doze  Watching TV: Would never doze  Sitting, inactive in a public place (e.g. a theatre or a meeting): Would never doze  As a passenger in a car for an hour without a break: Would never doze  Lying down to rest in the afternoon when circumstances permit: Slight chance of dozing  Sitting and talking to someone: Slight chance of dozing  Sitting quietly after a lunch without alcohol: Slight chance of dozing  In a car, while stopped for a few minutes in traffic: Would never doze  Total score: 3              Vitals:    08/29/24 0943   BP: 119/70   BP Location: Left arm   Patient Position: Sitting   Cuff Size: Adult   Weight: 52.6 kg (116 lb)   Height: 5' 3\" (1.6 m)       Body mass index is 20.55 kg/m².    Neck Circumference: 14       EPWORTH SLEEPINESS SCORE  Total score: 3      Past History Since Last Sleep Center Visit:     Continues with poor sleep.  He feels the symptoms may have worsened.    Would like to consider completing a sleep study as recommended at his last visit.      The review of systems and following portions of the patient's history were reviewed and updated as appropriate: allergies, current medications, past family history, past medical history, past social history, past surgical history, and problem " list.        OBJECTIVE          Physical Exam:     General Appearance:   Alert, cooperative, no distress, appears stated age     Head:   Normocephalic, without obvious abnormality, atraumatic     Eyes:  conjunctiva/corneas clear          Nose:  Nares normal, septum midline, no drainage or sinus tenderness     Neck:      Throat:      Supple, symmetrical, trachea midline, no adenopathy; Thyroid: No enlargement, tenderness or nodules; no carotid bruit or JVD      Lips, teeth and gums normal; tongue normal size and  shape and midline in position; mucosa, uvula normal, tonsils not visulaized, Mallampati class 2    Lungs:   Clear to auscultation bilaterally, respirations unlabored     Heart:   Regular rate and rhythm, S1 and S2 normal, no murmur, rub or gallop         ASSESSMENT / PLAN    1. Excessive daytime sleepiness  Assessment & Plan:  Patient experiences excessive daytime sleepiness and frequent nighttime awakenings.  I ordered a home sleep study to rule out sleep apnea as a contributing factor.  Orders:  -     Home Study; Future  2. Primary insomnia  Assessment & Plan:  Patient has been experiencing chronic insomnia for many years.  Currently, he is taking lorazepam 1 mg nightly.  He has tried multiple medications without significant success due to side effects or poor response.  He feels his Parkinson symptoms are greatly affecting his sleep.  I encouraged him to keep his appointment with neurology on October 10.  I advised him to discuss his sleep concerns in regards to him his Parkinson's disease.    Orders:  -     Ambulatory Referral to Sleep Medicine  3. Chronic insomnia  Assessment & Plan:  Patient has been experiencing chronic insomnia for many years.  Currently, he is taking lorazepam 1 mg nightly.  He has tried multiple medications without significant success due to side effects or poor response.  He feels his Parkinson symptoms are greatly affecting his sleep.  I encouraged him to keep his appointment  with neurology on October 10.  I advised him to discuss his sleep concerns in regards to him his Parkinson's disease.           Counseling / Coordination of Care    I have spent a total time of 40 minutes on 08/29/24 in caring for this patient including Risks and benefits of tx options, Patient and family education, Impressions, Counseling / Coordination of care, Documenting in the medical record, Reviewing / ordering tests, medicine, procedures  , and Obtaining or reviewing history  .    The following instructions have been given to the patient today:    Patient Instructions   As you are having significant difficulty in maintaining sleep at nighttime, it is likely your Parkinson symptoms could be playing a significant role.  I would discuss your insomnia issue with your neurologist at your upcoming appointment in October.  Hopefully with the medication adjustment, your sleep could improve.  I also placed an order for a home sleep study to rule out sleep apnea as a potential contributing factor to your poor sleep.  Will schedule follow-up after you see your neurologist and complete your sleep study.    Nursing Support:  When: Monday through Friday 7A-5PM except holidays  Where: Our direct line is [phone].    If you are having a true emergency please call 911.  In the event that the line is busy or it is after hours please leave a voice message and we will return your call.  Please speak clearly, leaving your full name, birth date, best number to reach you and the reason for your call.   Medication refills: We will need the name of the medication, the dosage, the ordering provider, whether you get a 30 or 90 day refill, and the pharmacy name and address.  Medications will be ordered by the provider only.  Nurses cannot call in prescriptions.  Please allow 7 days for medication refills.  Physician requested updates: If your provider requested that you call with an update after starting medication, please be ready  to provide us the medication and dosage, what time you take your medication, the time you attempt to fall asleep, time you fall asleep, when you wake up, and what time you get out of bed.  Sleep Study Results: We will contact you with sleep study results and/or next steps after the physician has reviewed your testing.         Kathie Ayala PA-C  [facility]

## 2024-08-29 NOTE — PATIENT INSTRUCTIONS
As you are having significant difficulty in maintaining sleep at nighttime, it is likely your Parkinson symptoms could be playing a significant role.  I would discuss your insomnia issue with your neurologist at your upcoming appointment in October.  Hopefully with the medication adjustment, your sleep could improve.  I also placed an order for a home sleep study to rule out sleep apnea as a potential contributing factor to your poor sleep.  Will schedule follow-up after you see your neurologist and complete your sleep study.    Nursing Support:  When: Monday through Friday 7A-5PM except holidays  Where: Our direct line is [phone].    If you are having a true emergency please call 911.  In the event that the line is busy or it is after hours please leave a voice message and we will return your call.  Please speak clearly, leaving your full name, birth date, best number to reach you and the reason for your call.   Medication refills: We will need the name of the medication, the dosage, the ordering provider, whether you get a 30 or 90 day refill, and the pharmacy name and address.  Medications will be ordered by the provider only.  Nurses cannot call in prescriptions.  Please allow 7 days for medication refills.  Physician requested updates: If your provider requested that you call with an update after starting medication, please be ready to provide us the medication and dosage, what time you take your medication, the time you attempt to fall asleep, time you fall asleep, when you wake up, and what time you get out of bed.  Sleep Study Results: We will contact you with sleep study results and/or next steps after the physician has reviewed your testing.

## 2024-08-29 NOTE — ASSESSMENT & PLAN NOTE
Patient experiences excessive daytime sleepiness and frequent nighttime awakenings.  I ordered a home sleep study to rule out sleep apnea as a contributing factor.

## 2024-08-29 NOTE — ASSESSMENT & PLAN NOTE
Patient has been experiencing chronic insomnia for many years.  Currently, he is taking lorazepam 1 mg nightly.  He has tried multiple medications without significant success due to side effects or poor response.  He feels his Parkinson symptoms are greatly affecting his sleep.  I encouraged him to keep his appointment with neurology on October 10.  I advised him to discuss his sleep concerns in regards to him his Parkinson's disease.

## 2024-09-23 ENCOUNTER — TELEPHONE (OUTPATIENT)
Age: 79
End: 2024-09-23

## 2024-09-23 NOTE — TELEPHONE ENCOUNTER
Pt called in requesting for an return call back from the practice kindly call him back in regards to his 6 months blood work. Thanks

## 2024-09-23 NOTE — TELEPHONE ENCOUNTER
Called the patient and he said he is requesting that Dr. Park take over ordering his labs [facility] of the [facility]. Patient wants to know if that's alright,Please advise?

## 2024-09-24 ENCOUNTER — TELEPHONE (OUTPATIENT)
Age: 79
End: 2024-09-24

## 2024-09-24 DIAGNOSIS — I10 ESSENTIAL HYPERTENSION: ICD-10-CM

## 2024-09-24 DIAGNOSIS — N18.31 STAGE 3A CHRONIC KIDNEY DISEASE (HCC): ICD-10-CM

## 2024-09-24 DIAGNOSIS — E78.5 DYSLIPIDEMIA: ICD-10-CM

## 2024-09-24 DIAGNOSIS — E11.42 TYPE 2 DIABETES MELLITUS WITH DIABETIC POLYNEUROPATHY, WITHOUT LONG-TERM CURRENT USE OF INSULIN (HCC): Primary | ICD-10-CM

## 2024-09-24 NOTE — TELEPHONE ENCOUNTER
Patient called in and has some question regarding the labs that have been ordered. Patient is requesting a call back at .    Thank you.

## 2024-09-24 NOTE — TELEPHONE ENCOUNTER
Spoke with pt and let him know his lab orders were in and he was able to get them done. Pt verbalized understanding no further questions.

## 2024-09-25 NOTE — TELEPHONE ENCOUNTER
Patient called again about his blood work to have done.  Patient informed fasting blood work has been place and he just have to be fasting 8-10 hours, plenty of water.

## 2024-09-27 NOTE — TELEPHONE ENCOUNTER
Called patient got VM LM that the provider will order what he requested, orders placed and go to the lab at his convenience.

## 2024-10-01 ENCOUNTER — TELEPHONE (OUTPATIENT)
Age: 79
End: 2024-10-01

## 2024-10-01 NOTE — TELEPHONE ENCOUNTER
Patient states he has not received the script for the sleep study that was ordered and was suppose to be mailed to him.  Would like to know if it was mailed to him because he has not received it yet. Please follow up with patient.

## 2024-10-01 NOTE — TELEPHONE ENCOUNTER
I called the patient got  LM that I did get his referral ready and it would have gone out with the  the next day. While typing this he called back and I told him I will mail it again to confirm receipt. Patient verbalized understanding.

## 2024-10-04 ENCOUNTER — APPOINTMENT (OUTPATIENT)
Dept: LAB | Facility: CLINIC | Age: 79
End: 2024-10-04
Payer: COMMERCIAL

## 2024-10-04 DIAGNOSIS — E11.42 TYPE 2 DIABETES MELLITUS WITH DIABETIC POLYNEUROPATHY, WITHOUT LONG-TERM CURRENT USE OF INSULIN (HCC): ICD-10-CM

## 2024-10-04 DIAGNOSIS — I10 ESSENTIAL HYPERTENSION: ICD-10-CM

## 2024-10-04 DIAGNOSIS — E78.5 DYSLIPIDEMIA: ICD-10-CM

## 2024-10-04 DIAGNOSIS — N18.31 STAGE 3A CHRONIC KIDNEY DISEASE (HCC): ICD-10-CM

## 2024-10-04 LAB
25(OH)D3 SERPL-MCNC: 33.1 NG/ML (ref 30–100)
ALBUMIN SERPL BCG-MCNC: 4.2 G/DL (ref 3.5–5)
ALP SERPL-CCNC: 48 U/L (ref 34–104)
ALT SERPL W P-5'-P-CCNC: 7 U/L (ref 7–52)
ANION GAP SERPL CALCULATED.3IONS-SCNC: 8 MMOL/L (ref 4–13)
AST SERPL W P-5'-P-CCNC: 21 U/L (ref 13–39)
BASOPHILS # BLD AUTO: 0.03 THOUSANDS/ΜL (ref 0–0.1)
BASOPHILS NFR BLD AUTO: 0 % (ref 0–1)
BILIRUB SERPL-MCNC: 0.57 MG/DL (ref 0.2–1)
BUN SERPL-MCNC: 53 MG/DL (ref 5–25)
CALCIUM SERPL-MCNC: 9 MG/DL (ref 8.4–10.2)
CHLORIDE SERPL-SCNC: 103 MMOL/L (ref 96–108)
CHOLEST SERPL-MCNC: 104 MG/DL
CO2 SERPL-SCNC: 23 MMOL/L (ref 21–32)
CREAT SERPL-MCNC: 1.4 MG/DL (ref 0.6–1.3)
CREAT UR-MCNC: 47.1 MG/DL
EOSINOPHIL # BLD AUTO: 0.23 THOUSAND/ΜL (ref 0–0.61)
EOSINOPHIL NFR BLD AUTO: 2 % (ref 0–6)
ERYTHROCYTE [DISTWIDTH] IN BLOOD BY AUTOMATED COUNT: 13.7 % (ref 11.6–15.1)
EST. AVERAGE GLUCOSE BLD GHB EST-MCNC: 140 MG/DL
GFR SERPL CREATININE-BSD FRML MDRD: 47 ML/MIN/1.73SQ M
GLUCOSE P FAST SERPL-MCNC: 121 MG/DL (ref 65–99)
HBA1C MFR BLD: 6.5 %
HCT VFR BLD AUTO: 44.8 % (ref 36.5–49.3)
HDLC SERPL-MCNC: 36 MG/DL
HGB BLD-MCNC: 15.1 G/DL (ref 12–17)
IMM GRANULOCYTES # BLD AUTO: 0.03 THOUSAND/UL (ref 0–0.2)
IMM GRANULOCYTES NFR BLD AUTO: 0 % (ref 0–2)
LDLC SERPL CALC-MCNC: 47 MG/DL (ref 0–100)
LYMPHOCYTES # BLD AUTO: 3.62 THOUSANDS/ΜL (ref 0.6–4.47)
LYMPHOCYTES NFR BLD AUTO: 35 % (ref 14–44)
MCH RBC QN AUTO: 31.4 PG (ref 26.8–34.3)
MCHC RBC AUTO-ENTMCNC: 33.7 G/DL (ref 31.4–37.4)
MCV RBC AUTO: 93 FL (ref 82–98)
MICROALBUMIN UR-MCNC: 90.3 MG/L
MICROALBUMIN/CREAT 24H UR: 192 MG/G CREATININE (ref 0–30)
MONOCYTES # BLD AUTO: 1.13 THOUSAND/ΜL (ref 0.17–1.22)
MONOCYTES NFR BLD AUTO: 11 % (ref 4–12)
NEUTROPHILS # BLD AUTO: 5.43 THOUSANDS/ΜL (ref 1.85–7.62)
NEUTS SEG NFR BLD AUTO: 52 % (ref 43–75)
NRBC BLD AUTO-RTO: 0 /100 WBCS
PLATELET # BLD AUTO: 216 THOUSANDS/UL (ref 149–390)
PMV BLD AUTO: 10.2 FL (ref 8.9–12.7)
POTASSIUM SERPL-SCNC: 4.7 MMOL/L (ref 3.5–5.3)
PROT SERPL-MCNC: 7.1 G/DL (ref 6.4–8.4)
PTH-INTACT SERPL-MCNC: 88.4 PG/ML (ref 12–88)
RBC # BLD AUTO: 4.81 MILLION/UL (ref 3.88–5.62)
SODIUM SERPL-SCNC: 134 MMOL/L (ref 135–147)
TRIGL SERPL-MCNC: 105 MG/DL
WBC # BLD AUTO: 10.47 THOUSAND/UL (ref 4.31–10.16)

## 2024-10-04 PROCEDURE — 82570 ASSAY OF URINE CREATININE: CPT

## 2024-10-04 PROCEDURE — 85025 COMPLETE CBC W/AUTO DIFF WBC: CPT

## 2024-10-04 PROCEDURE — 82043 UR ALBUMIN QUANTITATIVE: CPT

## 2024-10-04 PROCEDURE — 82306 VITAMIN D 25 HYDROXY: CPT

## 2024-10-04 PROCEDURE — 36415 COLL VENOUS BLD VENIPUNCTURE: CPT

## 2024-10-04 PROCEDURE — 80061 LIPID PANEL: CPT

## 2024-10-04 PROCEDURE — 83036 HEMOGLOBIN GLYCOSYLATED A1C: CPT

## 2024-10-04 PROCEDURE — 83970 ASSAY OF PARATHORMONE: CPT

## 2024-10-04 PROCEDURE — 80053 COMPREHEN METABOLIC PANEL: CPT

## 2024-10-31 DIAGNOSIS — F33.1 MODERATE RECURRENT MAJOR DEPRESSION (HCC): ICD-10-CM

## 2024-10-31 RX ORDER — ESCITALOPRAM OXALATE 10 MG/1
10 TABLET ORAL DAILY
Qty: 90 TABLET | Refills: 0 | Status: SHIPPED | OUTPATIENT
Start: 2024-10-31

## 2024-11-07 ENCOUNTER — TELEPHONE (OUTPATIENT)
Age: 79
End: 2024-11-07

## 2024-11-07 NOTE — TELEPHONE ENCOUNTER
Called that patient and let him know he would need to be evaluated prior to a study being ordered. Patient scheduled to be seen today. While scheduling the appt. I was getting a referral required message. I gave him his appt. Time but, told him I would call him back that I need to check with my supervisor if a referral is needed.

## 2024-11-07 NOTE — TELEPHONE ENCOUNTER
Patient called asking if he can have an order for an abdominal CT scan.  States that he is having abdominal pain near his belly button and wants to make sure that there isn't anything wrong internally.  States that the pain could be from his Parkinson's but he wants to be sure.    Patient would like a call back once this message is addressed.

## 2024-11-07 NOTE — TELEPHONE ENCOUNTER
I let the patient know that referral message only comes up due to the [facility] ins. Appt. Confirmed for today.

## 2024-12-23 ENCOUNTER — OFFICE VISIT (OUTPATIENT)
Dept: FAMILY MEDICINE CLINIC | Facility: CLINIC | Age: 79
End: 2024-12-23
Payer: COMMERCIAL

## 2024-12-23 VITALS
OXYGEN SATURATION: 98 % | HEART RATE: 65 BPM | HEIGHT: 63 IN | SYSTOLIC BLOOD PRESSURE: 126 MMHG | WEIGHT: 177.8 LBS | TEMPERATURE: 98.1 F | DIASTOLIC BLOOD PRESSURE: 70 MMHG | BODY MASS INDEX: 31.5 KG/M2

## 2024-12-23 DIAGNOSIS — R10.84 GENERALIZED ABDOMINAL PAIN: Primary | ICD-10-CM

## 2024-12-23 PROCEDURE — G2211 COMPLEX E/M VISIT ADD ON: HCPCS | Performed by: FAMILY MEDICINE

## 2024-12-23 PROCEDURE — 99214 OFFICE O/P EST MOD 30 MIN: CPT | Performed by: FAMILY MEDICINE

## 2024-12-23 NOTE — PROGRESS NOTES
Name: Elder Haro      : 1945      MRN: [id number]  Encounter Provider: Brittney Park MD  Encounter Date: 2024   Encounter department: [facility]  :  Assessment & Plan  Generalized abdominal pain  Pain may be related to underlying IBS however with nocturnal symptoms will obtain CT abdomen and pelvis for further evaluation as well as CBC and CMP. If no improvement recommend re-eval by GI  Orders:    CBC and differential; Future    Comprehensive metabolic panel; Future    CT abdomen pelvis w contrast; Future           HPI    Abdominal Pain  This is a chronic problem. Episode onset: 3 months ago. Episode frequency: Occurs intermittently but worse at night. The pain is located in the generalized abdominal region. The quality of the pain is dull. The abdominal pain does not radiate. Pertinent negatives include no anorexia, constipation, diarrhea, fever, flatus, nausea, vomiting or weight loss. Nothing aggravates the pain. The pain is relieved by Nothing. Treatments tried: Pepto-bismal. The treatment provided no relief. His past medical history is significant for irritable bowel syndrome.     Patient does have a history of IBS with diarrhea and had been seen by GI for this in the past however he is not having any diarrhea and states that this pain feels different. He reports that it mimics the feeling of being hungry and it has been waking him up at night. He thought the pain may be related to his underlying Parkinson's disease but now is not so sure. He denies any relation to food.     Review of Systems   Constitutional:  Negative for appetite change, chills, fever, unexpected weight change and weight loss.   HENT: Negative.     Respiratory: Negative.     Gastrointestinal:  Positive for abdominal pain. Negative for anorexia, blood in stool, constipation, diarrhea, flatus, nausea and vomiting.   Genitourinary: Negative.    Musculoskeletal: Negative.    Neurological: Negative.   "  Psychiatric/Behavioral: Negative.         Objective   /70 (BP Location: Left arm, Patient Position: Sitting, Cuff Size: Standard)   Pulse 65   Temp 98.1 °F (36.7 °C) (Temporal)   Ht 5' 3\" (1.6 m)   Wt 80.6 kg (177 lb 12.8 oz)   SpO2 98%   BMI 31.50 kg/m²      Physical Exam  Constitutional:       General: He is not in acute distress.     Appearance: He is not ill-appearing.   HENT:      Head: Normocephalic and atraumatic.   Cardiovascular:      Rate and Rhythm: Normal rate.   Pulmonary:      Effort: Pulmonary effort is normal. No respiratory distress.      Breath sounds: No wheezing.   Abdominal:      General: Bowel sounds are normal.      Palpations: Abdomen is soft.      Tenderness: There is no abdominal tenderness.   Neurological:      General: No focal deficit present.      Mental Status: He is alert.   Psychiatric:         Mood and Affect: Mood normal.         Behavior: Behavior normal.         "

## 2025-01-03 ENCOUNTER — HOSPITAL ENCOUNTER (OUTPATIENT)
Dept: CT IMAGING | Facility: HOSPITAL | Age: 80
Discharge: HOME | End: 2025-01-03
Attending: FAMILY MEDICINE
Payer: COMMERCIAL

## 2025-01-03 DIAGNOSIS — R10.84 GENERALIZED ABDOMINAL PAIN: ICD-10-CM

## 2025-01-03 PROCEDURE — 74177 CT ABD & PELVIS W/CONTRAST: CPT

## 2025-01-03 RX ADMIN — IOHEXOL 85 ML: 350 INJECTION, SOLUTION INTRAVENOUS at 15:23

## 2025-01-07 ENCOUNTER — TELEPHONE (OUTPATIENT)
Age: 80
End: 2025-01-07

## 2025-01-07 NOTE — TELEPHONE ENCOUNTER
Patient called in regards to wanting to know if his CT results have came in, patient was informed that results have not came in.

## 2025-01-09 ENCOUNTER — TELEPHONE (OUTPATIENT)
Age: 80
End: 2025-01-09

## 2025-01-09 ENCOUNTER — RESULTS FOLLOW-UP (OUTPATIENT)
Dept: FAMILY MEDICINE CLINIC | Facility: CLINIC | Age: 80
End: 2025-01-09

## 2025-01-09 NOTE — TELEPHONE ENCOUNTER
Received call from Patient regarding CT scan results   Reviewed provider notes     No additional questions at this time.

## 2025-01-24 ENCOUNTER — TELEPHONE (OUTPATIENT)
Age: 80
End: 2025-01-24

## 2025-01-24 ENCOUNTER — OFFICE VISIT (OUTPATIENT)
Dept: FAMILY MEDICINE CLINIC | Facility: CLINIC | Age: 80
End: 2025-01-24
Payer: COMMERCIAL

## 2025-01-24 VITALS
BODY MASS INDEX: 20.62 KG/M2 | HEART RATE: 62 BPM | DIASTOLIC BLOOD PRESSURE: 68 MMHG | WEIGHT: 116.4 LBS | HEIGHT: 63 IN | TEMPERATURE: 97.6 F | OXYGEN SATURATION: 96 % | SYSTOLIC BLOOD PRESSURE: 110 MMHG

## 2025-01-24 DIAGNOSIS — R10.84 GENERALIZED ABDOMINAL PAIN: Primary | ICD-10-CM

## 2025-01-24 PROCEDURE — G2211 COMPLEX E/M VISIT ADD ON: HCPCS | Performed by: FAMILY MEDICINE

## 2025-01-24 PROCEDURE — 99213 OFFICE O/P EST LOW 20 MIN: CPT | Performed by: FAMILY MEDICINE

## 2025-01-24 NOTE — TELEPHONE ENCOUNTER
Patient's pcp called in to schedule an urgent appt. Call was transferred over [place] Gal[place] for further assistance.

## 2025-01-24 NOTE — PROGRESS NOTES
"Name: Elder Haro      : 1945      MRN: [id number]  Encounter Provider: Brittney Park MD  Encounter Date: 2025   Encounter department: [facility]  :  Assessment & Plan  Generalized abdominal pain  No findings on physical examination and CT did not show any acute findings. Patient does have a history of IBS and may try increasing fiber in the diet. May also start trial of protonix for possible GERD/gastritis, ulcer.  Referral to GI has been placed for re-evaluation, clinical staff to help coordinate scheduling    Orders:    Ambulatory Referral to Gastroenterology; Future    pantoprazole (PROTONIX) 20 mg tablet; Take 1 tablet (20 mg total) by mouth daily before breakfast        HPI    Elder Haro is a very pleasant 79 year old male who presents today accompanied by his friend with a chief complaint of abdominal pain. Patient was seen for this a few weeks ago and at that time a CT was obtained which showed no acute findings in the abdomen or pelvis but scattered colonic diverticulosis with no inflammatory changes present to suggest acute diverticulitis. Patient states that he continues to have the pain which is worse at night. He reports that it mimics the feeling of being hungry. He denies any relation to food, nausea, vomiting, diarrhea, constipation etc.     Review of Systems   Constitutional: Negative.    HENT: Negative.     Eyes: Negative.    Respiratory: Negative.     Cardiovascular: Negative.    Gastrointestinal:  Positive for abdominal pain.   Musculoskeletal: Negative.    Skin: Negative.    Neurological: Negative.    Psychiatric/Behavioral: Negative.         Objective   /68 (BP Location: Left arm, Patient Position: Sitting, Cuff Size: Standard)   Pulse 62   Temp 97.6 °F (36.4 °C) (Temporal)   Ht 5' 3\" (1.6 m)   Wt 52.8 kg (116 lb 6.4 oz)   SpO2 96%   BMI 20.62 kg/m²      Physical Exam  Constitutional:       General: He is not in acute distress.     " Appearance: He is not ill-appearing.   HENT:      Head: Normocephalic and atraumatic.   Pulmonary:      Effort: Pulmonary effort is normal. No respiratory distress.      Breath sounds: Normal breath sounds.   Abdominal:      General: Bowel sounds are normal.      Palpations: Abdomen is soft.      Tenderness: There is no abdominal tenderness.   Neurological:      General: No focal deficit present.      Mental Status: He is alert.   Psychiatric:         Mood and Affect: Mood normal.         Behavior: Behavior normal.

## 2025-01-26 RX ORDER — PANTOPRAZOLE SODIUM 20 MG/1
20 TABLET, DELAYED RELEASE ORAL
Qty: 30 TABLET | Refills: 1 | Status: SHIPPED | OUTPATIENT
Start: 2025-01-26 | End: 2025-07-25

## 2025-01-27 DIAGNOSIS — F33.1 MODERATE RECURRENT MAJOR DEPRESSION (HCC): ICD-10-CM

## 2025-01-28 ENCOUNTER — RA CDI HCC (OUTPATIENT)
Dept: OTHER | Facility: HOSPITAL | Age: 80
End: 2025-01-28

## 2025-01-28 RX ORDER — ESCITALOPRAM OXALATE 10 MG/1
10 TABLET ORAL DAILY
Qty: 90 TABLET | Refills: 1 | Status: SHIPPED | OUTPATIENT
Start: 2025-01-28 | End: 2025-02-05 | Stop reason: SDUPTHER

## 2025-01-31 ENCOUNTER — TELEPHONE (OUTPATIENT)
Dept: FAMILY MEDICINE CLINIC | Facility: CLINIC | Age: 80
End: 2025-01-31

## 2025-01-31 NOTE — TELEPHONE ENCOUNTER
Elder Park,     Patient called the Medication refill line, he would like to get a new script for his Oxycodone, patient stats he takes it when he gets a shot in his eye.    Please call back today if possible [phone]

## 2025-02-02 DIAGNOSIS — H35.3230 BILATERAL EXUDATIVE AGE-RELATED MACULAR DEGENERATION, UNSPECIFIED STAGE (HCC): ICD-10-CM

## 2025-02-02 RX ORDER — OXYCODONE AND ACETAMINOPHEN 2.5; 325 MG/1; MG/1
1 TABLET ORAL EVERY 8 HOURS PRN
Qty: 6 TABLET | Refills: 0 | Status: SHIPPED | OUTPATIENT
Start: 2025-02-02

## 2025-02-05 ENCOUNTER — OFFICE VISIT (OUTPATIENT)
Dept: FAMILY MEDICINE CLINIC | Facility: CLINIC | Age: 80
End: 2025-02-05
Payer: COMMERCIAL

## 2025-02-05 VITALS
HEIGHT: 63 IN | BODY MASS INDEX: 20.43 KG/M2 | WEIGHT: 115.3 LBS | OXYGEN SATURATION: 99 % | TEMPERATURE: 97 F | HEART RATE: 63 BPM

## 2025-02-05 DIAGNOSIS — N18.31 STAGE 3A CHRONIC KIDNEY DISEASE (HCC): ICD-10-CM

## 2025-02-05 DIAGNOSIS — H35.3230 BILATERAL EXUDATIVE AGE-RELATED MACULAR DEGENERATION, UNSPECIFIED STAGE (HCC): ICD-10-CM

## 2025-02-05 DIAGNOSIS — F33.1 MODERATE RECURRENT MAJOR DEPRESSION (HCC): ICD-10-CM

## 2025-02-05 DIAGNOSIS — E11.42 TYPE 2 DIABETES MELLITUS WITH DIABETIC POLYNEUROPATHY, WITHOUT LONG-TERM CURRENT USE OF INSULIN (HCC): ICD-10-CM

## 2025-02-05 DIAGNOSIS — G20.A1 PARKINSON'S DISEASE, UNSPECIFIED WHETHER DYSKINESIA PRESENT, UNSPECIFIED WHETHER MANIFESTATIONS FLUCTUATE (HCC): ICD-10-CM

## 2025-02-05 DIAGNOSIS — I10 ESSENTIAL HYPERTENSION: ICD-10-CM

## 2025-02-05 DIAGNOSIS — R10.84 GENERALIZED ABDOMINAL PAIN: ICD-10-CM

## 2025-02-05 DIAGNOSIS — I50.20 HEART FAILURE WITH REDUCED EJECTION FRACTION (HCC): ICD-10-CM

## 2025-02-05 DIAGNOSIS — Z00.00 MEDICARE ANNUAL WELLNESS VISIT, SUBSEQUENT: Primary | ICD-10-CM

## 2025-02-05 PROBLEM — F11.20 OPIOID DEPENDENCE, UNCOMPLICATED (HCC): Status: RESOLVED | Noted: 2025-02-05 | Resolved: 2025-02-05

## 2025-02-05 PROBLEM — F11.20 OPIOID DEPENDENCE, UNCOMPLICATED (HCC): Status: ACTIVE | Noted: 2025-02-05

## 2025-02-05 PROCEDURE — G0439 PPPS, SUBSEQ VISIT: HCPCS | Performed by: FAMILY MEDICINE

## 2025-02-05 PROCEDURE — G2211 COMPLEX E/M VISIT ADD ON: HCPCS | Performed by: FAMILY MEDICINE

## 2025-02-05 PROCEDURE — 99214 OFFICE O/P EST MOD 30 MIN: CPT | Performed by: FAMILY MEDICINE

## 2025-02-05 RX ORDER — ESCITALOPRAM OXALATE 10 MG/1
10 TABLET ORAL DAILY
Qty: 90 TABLET | Refills: 1 | Status: SHIPPED | OUTPATIENT
Start: 2025-02-05

## 2025-02-05 NOTE — ASSESSMENT & PLAN NOTE
Lab Results   Component Value Date    EGFR 47 10/04/2024    EGFR 56 (L) 06/01/2024    EGFR 49 (L) 03/14/2024    CREATININE 1.40 (H) 10/04/2024    CREATININE 1.30 06/01/2024    CREATININE 1.47 (H) 03/14/2024   Follows with nephrology  Avoid NSAIDs and other nephrotoxic agents

## 2025-02-05 NOTE — ASSESSMENT & PLAN NOTE
Wt Readings from Last 3 Encounters:   02/05/25 52.3 kg (115 lb 4.8 oz)   01/24/25 52.8 kg (116 lb 6.4 oz)   12/23/24 80.6 kg (177 lb 12.8 oz)       - Weight stable and patient euvolemic on exam  - Continue current management of Entresto 24-26 mg twice daily, Jardiance 12.5 mg, Lasix 40 mg daily and spironolactone 25 mg daily  - Follow-up with cardiology as scheduled

## 2025-02-05 NOTE — ASSESSMENT & PLAN NOTE
Continue Lexapro 10 mg daily    Orders:    escitalopram (LEXAPRO) 10 mg tablet; Take 1 tablet (10 mg total) by mouth daily

## 2025-02-05 NOTE — PROGRESS NOTES
Patient's shoes and socks removed.    Right Foot/Ankle   Right Foot Inspection  Skin Exam: skin normal and skin intact. No dry skin, no warmth, no callus, no erythema, no maceration, no abnormal color, no pre-ulcer, no ulcer and no callus.     Toe Exam: ROM and strength within normal limits. No swelling, no tenderness, erythema and  no right toe deformity    Sensory   Vibration: intact  Proprioception: intact  Monofilament testing: intact    Vascular  Capillary refills: < 3 seconds  The right DP pulse is 2+. The right PT pulse is 2+.     Left Foot/Ankle  Left Foot Inspection  Skin Exam: skin normal and skin intact. No dry skin, no warmth, no erythema, no maceration, normal color, no pre-ulcer, no ulcer and no callus.     Toe Exam: ROM and strength within normal limits. No tenderness and no left toe deformity.     Sensory   Vibration: intact  Proprioception: intact  Monofilament testing: intact    Vascular  Capillary refills: < 3 seconds  The left DP pulse is 2+. The left PT pulse is 2+.     Assign Risk Category  No deformity present  No loss of protective sensation  No weak pulses  Risk: 0

## 2025-02-05 NOTE — PROGRESS NOTES
Name: Elder Haro      : 1945      MRN: [id number]  Encounter Provider: Brittney Park MD  Encounter Date: 2025   Encounter department: [facility]    Assessment & Plan  Medicare annual wellness visit, subsequent  Preventive health issues were discussed with patient, and age appropriate screening tests were ordered as noted in patient's After Visit Summary.   Personalized health advice and appropriate referrals for health education or preventive services given if needed, as noted in patient's After Visit Summary.  Follow-up in 4 months or sooner if needed       Type 2 diabetes mellitus with diabetic polyneuropathy, without long-term current use of insulin (HCC)    Lab Results   Component Value Date    HGBA1C 6.5 (H) 10/04/2024   Follows with endocrinology continue Jardiance 12.5 mg daily    Orders:    Hemoglobin A1C; Future    Albumin / creatinine urine ratio; Future    Lipid Panel with Direct LDL reflex; Future    Heart failure with reduced ejection fraction (HCC)  Wt Readings from Last 3 Encounters:   25 52.3 kg (115 lb 4.8 oz)   25 52.8 kg (116 lb 6.4 oz)   24 80.6 kg (177 lb 12.8 oz)       - Weight stable and patient euvolemic on exam  - Continue current management of Entresto 24-26 mg twice daily, Jardiance 12.5 mg, Lasix 40 mg daily and spironolactone 25 mg daily  - Follow-up with cardiology as scheduled             Essential hypertension  Continue metoprolol 50 mg daily         Stage 3a chronic kidney disease (HCC)  Lab Results   Component Value Date    EGFR 47 10/04/2024    EGFR 56 (L) 2024    EGFR 49 (L) 2024    CREATININE 1.40 (H) 10/04/2024    CREATININE 1.30 2024    CREATININE 1.47 (H) 2024   Follows with nephrology  Avoid NSAIDs and other nephrotoxic agents         Moderate recurrent major depression (HCC)  Continue Lexapro 10 mg daily    Orders:    escitalopram (LEXAPRO) 10 mg tablet; Take 1 tablet (10 mg total) by mouth  daily    Parkinson's disease, unspecified whether dyskinesia present, unspecified whether manifestations fluctuate (HCC)  Follows with neurology.         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 macular degeneration, unspecified stage (HCC)  Follows with ophthalmology         Generalized abdominal pain  May try increasing Protonix to 40 mg daily.  We also discussed increasing fiber in the diet with Metamucil as well as stool softeners.  Follow-up with GI as scheduled 2/20/2025            History of Present Illness   Elder Haro is a very pleasant 79-year-old male who presents today for Medicare wellness visit and follow-up.  Patient continues to have abdominal pain despite being started on Protonix 20 mg daily.  He does have a appointment with gastroenterology scheduled in 2 weeks.  He is also hoping to take part in a fitness program specifically for those with Parkinson's disease at [facility].  He continues to follow with specialties including cardiology, nephrology, endocrinology, ophthalmology and neurology.    Patient Care Team:  Brittney Park MD as PCP - General (Family Medicine)  DO Manny Saldana MD Brian Murphy, MD (Urology)  Alexsander Pierson DO (Inactive) (Sleep Medicine)  Waqar Cooper MD (Cardiology)    Review of Systems   Constitutional: Negative.    HENT: Negative.     Eyes: Negative.    Respiratory: Negative.     Cardiovascular: Negative.    Gastrointestinal:  Positive for abdominal pain and constipation.   Skin: Negative.    Neurological: Negative.    Psychiatric/Behavioral: Negative.       Medical History Reviewed by provider this encounter:       Annual Wellness Visit Questionnaire   Elder is here for his Subsequent Wellness visit.     Health Risk Assessment:   Patient rates overall health as fair. Patient feels that their physical health rating is slightly worse. Patient is satisfied with their life. Eyesight was rated as slightly worse. Hearing  was rated as slightly worse. Patient feels that their emotional and mental health rating is same. Patients states they are never, rarely angry. Patient states they are often unusually tired/fatigued. Pain experienced in the last 7 days has been none. Patient states that he has experienced no weight loss or gain in last 6 months.     Depression Screening:   PHQ-2 Score: 0      Fall Risk Screening:   In the past year, patient has experienced: no history of falling in past year      Home Safety:  Patient does not have trouble with stairs inside or outside of their home. Patient has working smoke alarms and has no working carbon monoxide detector. Home safety hazards include: none.     Nutrition:   Current diet is Regular.     Medications:   Patient is not currently taking any over-the-counter supplements. Patient is not able to manage medications.     Activities of Daily Living (ADLs)/Instrumental Activities of Daily Living (IADLs):   Walk and transfer into and out of bed and chair?: Yes  Dress and groom yourself?: Yes    Bathe or shower yourself?: Yes    Feed yourself? Yes  Do your laundry/housekeeping?: No  Manage your money, pay your bills and track your expenses?: Yes  Make your own meals?: No    Do your own shopping?: Yes    Previous Hospitalizations:   Any hospitalizations or ED visits within the last 12 months?: Yes      Advance Care Planning:   Living will: Yes    Advanced directive: Yes      PREVENTIVE SCREENINGS      Cardiovascular Screening:    General: Screening Current      Diabetes Screening:     General: Screening Not Indicated and History Diabetes      Prostate Cancer Screening:    General: Screening Not Indicated      Lung Cancer Screening:     General: Screening Not Indicated      Hepatitis C Screening:    General: Screening Current    Screening, Brief Intervention, and Referral to Treatment (SBIRT)    Screening    Typical number of drinks in a week: 2    Single Item Drug Screening:  How often have  "you used an illegal drug (including marijuana) or a prescription medication for non-medical reasons in the past year? never    Single Item Drug Screen Score: 0  Interpretation: Negative screen for possible drug use disorder    Social Drivers of Health     Financial Resource Strain: Low Risk  (3/2/2024)    [facility]    Overall Financial Resource Strain (CARDIA)     Difficulty of Paying Living Expenses: Not very hard   Food Insecurity: No Food Insecurity (2/5/2025)    Hunger Vital Sign     Worried About Running Out of Food in the Last Year: Never true     Ran Out of Food in the Last Year: Never true   Transportation Needs: No Transportation Needs (2/5/2025)    PRAPARE - Transportation     Lack of Transportation (Medical): No     Lack of Transportation (Non-Medical): No   Housing Stability: Unknown (2/5/2025)    Housing Stability Vital Sign     Unable to Pay for Housing in the Last Year: No     Homeless in the Last Year: No   Utilities: Not At Risk (2/5/2025)    [facility] Utilities     Threatened with loss of utilities: No     No results found.    Objective   Pulse 63   Temp (!) 97 °F (36.1 °C) (Temporal)   Ht 5' 3\" (1.6 m)   Wt 52.3 kg (115 lb 4.8 oz)   SpO2 99%   BMI 20.42 kg/m²     Physical Exam  Constitutional:       General: He is not in acute distress.     Appearance: He is not ill-appearing.   HENT:      Head: Normocephalic and atraumatic.   Eyes:      General:         Right eye: No discharge.         Left eye: No discharge.      Extraocular Movements: Extraocular movements intact.   Cardiovascular:      Rate and Rhythm: Normal rate.   Pulmonary:      Effort: Pulmonary effort is normal. No respiratory distress.      Breath sounds: No wheezing.   Abdominal:      General: Bowel sounds are normal. There is no distension.      Palpations: Abdomen is soft.      Tenderness: There is no abdominal tenderness. There is no guarding.   Neurological:      " General: No focal deficit present.      Mental Status: He is alert.   Psychiatric:         Mood and Affect: Mood normal.         Behavior: Behavior normal.

## 2025-02-05 NOTE — PATIENT INSTRUCTIONS
Medicare Preventive Visit Patient Instructions  Thank you for completing your Welcome to Medicare Visit or Medicare Annual Wellness Visit today. Your next wellness visit will be due in one year (2/6/2026).  The screening/preventive services that you may require over the next 5-10 years are detailed below. Some tests may not apply to you based off risk factors and/or age. Screening tests ordered at today's visit but not completed yet may show as past due. Also, please note that scanned in results may not display below.  Preventive Screenings:  Service Recommendations Previous Testing/Comments   Colorectal Cancer Screening  Colonoscopy    Fecal Occult Blood Test (FOBT)/Fecal Immunochemical Test (FIT)  Fecal DNA/Cologuard Test  Flexible Sigmoidoscopy Age: 45-75 years old   Colonoscopy: every 10 years (May be performed more frequently if at higher risk)  OR  FOBT/FIT: every 1 year  OR  Cologuard: every 3 years  OR  Sigmoidoscopy: every 5 years  Screening may be recommended earlier than age 45 if at higher risk for colorectal cancer. Also, an individualized decision between you and your healthcare provider will decide whether screening between the ages of 76-85 would be appropriate. Colonoscopy: 03/19/2019  FOBT/FIT: Not on file  Cologuard: Not on file  Sigmoidoscopy: Not on file          Prostate Cancer Screening Individualized decision between patient and health care provider in men between ages of 55-69   Medicare will cover every 12 months beginning on the day after your 50th birthday PSA: 4.9 ng/mL           Hepatitis C Screening Once for adults born between 1945 and 1965  More frequently in patients at high risk for Hepatitis C Hep C Antibody: 09/18/2023        Diabetes Screening 1-2 times per year if you're at risk for diabetes or have pre-diabetes Fasting glucose: 121 mg/dL (10/4/2024)  A1C: 6.5 % (10/4/2024)      Cholesterol Screening Once every 5 years if you don't have a lipid disorder. May order more often  based on risk factors. Lipid panel: 10/04/2024         Other Preventive Screenings Covered by Medicare:  Abdominal Aortic Aneurysm (AAA) Screening: covered once if your at risk. You're considered to be at risk if you have a family history of AAA or a male between the age of 65-75 who smoking at least 100 cigarettes in your lifetime.  Lung Cancer Screening: covers low dose CT scan once per year if you meet all of the following conditions: (1) Age 55-77; (2) No signs or symptoms of lung cancer; (3) Current smoker or have quit smoking within the last 15 years; (4) You have a tobacco smoking history of at least 20 pack years (packs per day x number of years you smoked); (5) You get a written order from a healthcare provider.  Glaucoma Screening: covered annually if you're considered high risk: (1) You have diabetes OR (2) Family history of glaucoma OR (3)  aged 50 and older OR (4)  American aged 65 and older  Osteoporosis Screening: covered every 2 years if you meet one of the following conditions: (1) Have a vertebral abnormality; (2) On glucocorticoid therapy for more than 3 months; (3) Have primary hyperparathyroidism; (4) On osteoporosis medications and need to assess response to drug therapy.  HIV Screening: covered annually if you're between the age of 15-65. Also covered annually if you are younger than 15 and older than 65 with risk factors for HIV infection. For pregnant patients, it is covered up to 3 times per pregnancy.    Immunizations:  Immunization Recommendations   Influenza Vaccine Annual influenza vaccination during flu season is recommended for all persons aged >= 6 months who do not have contraindications   Pneumococcal Vaccine   * Pneumococcal conjugate vaccine = PCV13 (Prevnar 13), PCV15 (Vaxneuvance), PCV20 (Prevnar 20)  * Pneumococcal polysaccharide vaccine = PPSV23 (Pneumovax) Adults 19-65 yo with certain risk factors or if 65+ yo  If never received any pneumonia vaccine:  recommend Prevnar 20 (PCV20)  Give PCV20 if previously received 1 dose of PCV13 or PPSV23   Hepatitis B Vaccine 3 dose series if at intermediate or high risk (ex: diabetes, end stage renal disease, liver disease)   Respiratory syncytial virus (RSV) Vaccine - COVERED BY MEDICARE PART D  * RSVPreF3 (Arexvy) CDC recommends that adults 60 years of age and older may receive a single dose of RSV vaccine using shared clinical decision-making (SCDM)   Tetanus (Td) Vaccine - COST NOT COVERED BY MEDICARE PART B Following completion of primary series, a booster dose should be given every 10 years to maintain immunity against tetanus. Td may also be given as tetanus wound prophylaxis.   Tdap Vaccine - COST NOT COVERED BY MEDICARE PART B Recommended at least once for all adults. For pregnant patients, recommended with each pregnancy.   Shingles Vaccine (Shingrix) - COST NOT COVERED BY MEDICARE PART B  2 shot series recommended in those 19 years and older who have or will have weakened immune systems or those 50 years and older     Health Maintenance Due:      Topic Date Due   • Hepatitis C Screening  Completed   • Colorectal Cancer Screening  Discontinued     Immunizations Due:      Topic Date Due   • Influenza Vaccine (1) 09/01/2024   • COVID-19 Vaccine (4 - 2024-25 season) 09/01/2024     Advance Directives   What are advance directives?  Advance directives are legal documents that state your wishes and plans for medical care. These plans are made ahead of time in case you lose your ability to make decisions for yourself. Advance directives can apply to any medical decision, such as the treatments you want, and if you want to donate organs.   What are the types of advance directives?  There are many types of advance directives, and each state has rules about how to use them. You may choose a combination of any of the following:  Living will:  This is a written record of the treatment you want. You can also choose which  treatments you do not want, which to limit, and which to stop at a certain time. This includes surgery, medicine, IV fluid, and tube feedings.   Durable power of  for healthcare (DPAHC):  This is a written record that states who you want to make healthcare choices for you when you are unable to make them for yourself. This person, called a proxy, is usually a family member or a friend. You may choose more than 1 proxy.  Do not resuscitate (DNR) order:  A DNR order is used in case your heart stops beating or you stop breathing. It is a request not to have certain forms of treatment, such as CPR. A DNR order may be included in other types of advance directives.  Medical directive:  This covers the care that you want if you are in a coma, near death, or unable to make decisions for yourself. You can list the treatments you want for each condition. Treatment may include pain medicine, surgery, blood transfusions, dialysis, IV or tube feedings, and a ventilator (breathing machine).  Values history:  This document has questions about your views, beliefs, and how you feel and think about life. This information can help others choose the care that you would choose.  Why are advance directives important?  An advance directive helps you control your care. Although spoken wishes may be used, it is better to have your wishes written down. Spoken wishes can be misunderstood, or not followed. Treatments may be given even if you do not want them. An advance directive may make it easier for your family to make difficult choices about your care.       © Copyright [employer] 2018 Information is for End User's use only and may not be sold, redistributed or otherwise used for commercial purposes. All illustrations and images included in CareNotes® are the copyrighted property of [employer]. or [employer]

## 2025-02-05 NOTE — ASSESSMENT & PLAN NOTE
Lab Results   Component Value Date    HGBA1C 6.5 (H) 10/04/2024   Follows with endocrinology continue Jardiance 12.5 mg daily    Orders:    Hemoglobin A1C; Future    Albumin / creatinine urine ratio; Future    Lipid Panel with Direct LDL reflex; Future

## 2025-02-17 ENCOUNTER — TELEPHONE (OUTPATIENT)
Age: 80
End: 2025-02-17

## 2025-02-17 NOTE — TELEPHONE ENCOUNTER
Patient called in inquiring advice from PCP pt states that he is taking  pantoprazole (PROTONIX) 20 mg tablet   Pt wants to know if he can take probiotics with this medication Please Advise

## 2025-02-20 ENCOUNTER — APPOINTMENT (OUTPATIENT)
Dept: RADIOLOGY | Facility: MEDICAL CENTER | Age: 80
End: 2025-02-20
Payer: COMMERCIAL

## 2025-02-20 ENCOUNTER — OFFICE VISIT (OUTPATIENT)
Dept: GASTROENTEROLOGY | Facility: MEDICAL CENTER | Age: 80
End: 2025-02-20
Payer: COMMERCIAL

## 2025-02-20 ENCOUNTER — TELEPHONE (OUTPATIENT)
Dept: GASTROENTEROLOGY | Facility: MEDICAL CENTER | Age: 80
End: 2025-02-20

## 2025-02-20 VITALS
DIASTOLIC BLOOD PRESSURE: 74 MMHG | HEART RATE: 62 BPM | TEMPERATURE: 98 F | SYSTOLIC BLOOD PRESSURE: 149 MMHG | HEIGHT: 63 IN | OXYGEN SATURATION: 98 % | BODY MASS INDEX: 20.38 KG/M2 | WEIGHT: 115 LBS

## 2025-02-20 DIAGNOSIS — R10.84 GENERALIZED ABDOMINAL PAIN: ICD-10-CM

## 2025-02-20 PROCEDURE — 99214 OFFICE O/P EST MOD 30 MIN: CPT | Performed by: NURSE PRACTITIONER

## 2025-02-20 PROCEDURE — 74022 RADEX COMPL AQT ABD SERIES: CPT

## 2025-02-20 RX ORDER — SODIUM CHLORIDE, SODIUM LACTATE, POTASSIUM CHLORIDE, CALCIUM CHLORIDE 600; 310; 30; 20 MG/100ML; MG/100ML; MG/100ML; MG/100ML
125 INJECTION, SOLUTION INTRAVENOUS CONTINUOUS
OUTPATIENT
Start: 2025-02-20

## 2025-02-20 RX ORDER — GLIPIZIDE 100 %
POWDER (GRAM) MISCELLANEOUS
COMMUNITY

## 2025-02-20 RX ORDER — CEPHALEXIN 500 MG/1
500 CAPSULE ORAL 4 TIMES DAILY
COMMUNITY
Start: 2025-02-20 | End: 2025-02-27

## 2025-02-20 NOTE — TELEPHONE ENCOUNTER
Procedure: EGD  Date: 03/04/2025  Physician performing: Dr. Guerrier  Location of procedure:    Instructions given to patient: EGD   Diabetic: Yes  Clearances: N/A    Patient is diabetic   Jardiance 4 day hold prior to procedure pt is aware

## 2025-02-20 NOTE — PROGRESS NOTES
Name: Elder Haro      : 1945      MRN: [id number]  Encounter Provider: ADAMARIS Fuentes  Encounter Date: 2025   Encounter department: [facility]  :  Assessment & Plan  Generalized abdominal pain  In the emergency room yesterday for worsened periumbilical and epigastric pain.  Reports he has had this pain for over a year.  Cannot pinpoint triggers.  Occasionally has loose stools.  He was diagnosed with a urinary tract infection and urinary retention yesterday.  A Guerrier catheter was placed and he is to follow-up with urology in a few days.  He was started on antibiotics.  Reports that his pain is not just periumbilical but epigastric.  Occasionally worse after he eats.  He was seen by his PCP for this pain and given pantoprazole 20 mg daily.  That was no help and he recently had it increased to 40 mg daily with little to no help.  Denies any nausea, vomiting or dysphagia.  No weight loss.  Reports BMs overall are formed but are occasionally loose.  No melena or hematochezia.  Last colonoscopy was  which noted diverticulosis and internal hemorrhoids.  He did have a recent CT  and  which noted no acute findings.  I do think that some of this pain could be related to his urinary tract infection and urinary retention but will rule out constipation or possible upper GI etiology.  Will set up an EGD to rule out PUD, gastritis/esophagitis, celiac and H. pylori.  Will obtain an obstructive series to assess stool burden.  Patient was in agreement with plan.  Would consider an antispasmodic but do not want to add to his urinary retention.  Will hold on this at this time.  Will need procedure done in hospital setting due to his comorbidities.  Orders:    Ambulatory Referral to Gastroenterology    EGD; Future    XR abdomen obstruction series; Future  -Continue pantoprazole 40 mg daily  -Follow-up in office after testing      History of Present  Illness   Elder Haro is a 79 y.o. male who presents for follow-up.  He was last seen by Dr. Duenas 9/23 for IBS with diarrhea.  He does have a past medical history of type 2 diabetes, heart failure with reduced ejection fraction, HTN, stage III chronic kidney disease, depression, Parkinson's and macular degeneration.  HPI    History of intermittent bouts of loose stools at last visit.  Physical therapy was recommended for biofeedback but patient did not go.  At last visit reported 80% of his bowel movements were formed and regular and around 20% was intermittent loose stools.  He did have an extensive workup in the past with negative celiac panel, negative for infectious causes and negative for inflammation diarrhea.  He underwent a CT abdomen and pelvis which was negative.  His last colonoscopy was 2019 which noted diverticulosis and internal hemorrhoids.    Interval history: He was in the emergency room yesterday for worsened periumbilical and epigastric pain.  Reports he has had this pain for over a year.  Cannot pinpoint triggers.  Occasionally has loose stools.  He was diagnosed with a urinary tract infection and urinary retention yesterday.  A Guerrier catheter was placed and he is to follow-up with urology in a few days.  He was started on antibiotics.  Reports that his pain is not just periumbilical but epigastric.  Occasionally worse after he eats.  He was seen by his PCP for this pain and given pantoprazole 20 mg daily.  That was no help and he recently had it increased to 40 mg daily with little to no help.  Denies any nausea, vomiting or dysphagia.  No weight loss.  Reports BMs overall are formed but are occasionally loose.  No melena or hematochezia.  Last colonoscopy was 2019 which noted diverticulosis and internal hemorrhoids.  He did have a recent CT 1/25 and 2/25 which noted no acute findings.  No fever or chills.    CT 2/25 noted no acute findings.  Labs 2/25-CMP normal other than BUN 30, glucose  134.  CBC 10/24 was normal other than WBCs 10.4, hemoglobin A1c 6.5.    Prior EGD/colonoscopy    Colonoscopy 2019-diverticulosis and internal hemorrhoids.    History obtained from: patient      Review of Systems   Constitutional:  Negative for chills and fever.   HENT:  Negative for ear pain and sore throat.    Eyes:  Negative for pain and visual disturbance.   Respiratory:  Negative for cough and shortness of breath.    Cardiovascular:  Negative for chest pain and palpitations.   Gastrointestinal:  Positive for abdominal pain. Negative for vomiting.   Genitourinary:  Negative for dysuria and hematuria.   Musculoskeletal:  Negative for arthralgias and back pain.   Skin:  Negative for color change and rash.   Neurological:  Negative for seizures and syncope.   All other systems reviewed and are negative.   A complete review of systems is negative other than that noted above in the HPI.    Medical History Reviewed by provider this encounter:  Tobacco  Allergies  Meds  Problems  Med Hx  Surg Hx  Fam Hx     .  Current Outpatient Medications on File Prior to Visit   Medication Sig Dispense Refill    Alogliptin Benzoate 25 MG TABS Take 0.5 tablets by mouth daily      aspirin (ECOTRIN LOW STRENGTH) 81 mg EC tablet 81 mg (Patient not taking: Reported on 4/29/2024)      carbidopa-levodopa (SINEMET CR)  mg TBCR per ER tablet Take 1 tablet by mouth 3 (three) times a day      clotrimazole-betamethasone (LOTRISONE) 1-0.05 % cream Apply topically 2 (two) times a day 30 g 0    dextran 70-hypromellose (GENTEAL TEARS) 0.1-0.3 % ophthalmic solution as needed      Diclofenac Sodium (VOLTAREN) 1 % APPLY 4GM TO LOWER EXTREMITIES TOPICALLY TWICE A DAY **USE DOSING CARD** (DO NOT EXCEED 16 GRAMS DAILY TO ANY AFFECTED JOINT OF THE LOWER EXTREMITIES. DO NOT EXCEED 8 GRAMS DAILY TO ANY AFFECTED JOINT OF THE UPPER EXTREMITIES. DO NOT EXCEED A TOTAL DOSE OF 32 GRAMS DAILY OVER ALL JOINTS)      Empagliflozin 25 MG TABS 12.5 mg       Entresto 24-26 MG TABS Take 1 tablet by mouth 2 (two) times a day      erythromycin (ILOTYCIN) ophthalmic ointment apply A 1 CM RIBBON OF OINTMENT INTO LOWER CONJUNCTIVAL SAC OF RI...  (REFER TO PRESCRIPTION NOTES).      escitalopram (LEXAPRO) 10 mg tablet Take 1 tablet (10 mg total) by mouth daily 90 tablet 1    fluticasone (FLONASE) 50 mcg/act nasal spray INSTILL 1 SPRAY IN NOSTRIL(S) TWICE A DAY FOR NASAL ALLERGIES IN EACH NOSTRIL (Patient not taking: Reported on 2/5/2025)      furosemide (LASIX) 40 mg tablet Take 40 mg by mouth daily (Patient not taking: Reported on 2/5/2025)      glucose blood test strip Test once daily and as needed 300 each 3    LORazepam (ATIVAN) 1 mg tablet Take 1 mg by mouth every 8 (eight) hours as needed for anxiety      metoprolol succinate (TOPROL-XL) 50 mg 24 hr tablet Take 50 mg by mouth      Multiple Vitamin (MULTIVITAMINS PO) TAKE ONE CAP/TAB BY MOUTH EVERY MORNING FOR SUPPLEMENTATION (Patient not taking: Reported on 2/5/2025)      naloxone (NARCAN) 4 mg/0.1 mL nasal spray Administer 1 spray into a nostril. If no response after 2-3 minutes, give another dose in the other nostril using a new spray. (Patient not taking: Reported on 7/30/2024) 1 each 1    oxyCODONE-acetaminophen (Percocet) 2.5-325 MG per tablet Take 1 tablet by mouth every 8 (eight) hours as needed for moderate pain Max Daily Amount: 3 tablets (Patient not taking: Reported on 2/5/2025) 6 tablet 0    pantoprazole (PROTONIX) 20 mg tablet Take 1 tablet (20 mg total) by mouth daily before breakfast 30 tablet 1    simvastatin (ZOCOR) 80 mg tablet Take 80 mg by mouth      spironolactone (ALDACTONE) 25 mg tablet Take 1 tablet by mouth daily      valsartan (DIOVAN) 160 mg tablet Take 160 mg by mouth daily       No current facility-administered medications on file prior to visit.      Social History     Tobacco Use    Smoking status: Never    Smokeless tobacco: Never   Vaping Use    Vaping status: Never Used   Substance and  Sexual Activity    Alcohol use: Yes     Alcohol/week: 21.0 standard drinks of alcohol     Types: 14 Cans of beer, 7 Shots of liquor per week     Comment: occ    Drug use: No    Sexual activity: Not on file     Current Outpatient Medications   Medication Sig Dispense Refill    Alogliptin Benzoate 25 MG TABS Take 0.5 tablets by mouth daily      aspirin (ECOTRIN LOW STRENGTH) 81 mg EC tablet 81 mg (Patient not taking: Reported on 4/29/2024)      carbidopa-levodopa (SINEMET CR)  mg TBCR per ER tablet Take 1 tablet by mouth 3 (three) times a day      clotrimazole-betamethasone (LOTRISONE) 1-0.05 % cream Apply topically 2 (two) times a day 30 g 0    dextran 70-hypromellose (GENTEAL TEARS) 0.1-0.3 % ophthalmic solution as needed      Diclofenac Sodium (VOLTAREN) 1 % APPLY 4GM TO LOWER EXTREMITIES TOPICALLY TWICE A DAY **USE DOSING CARD** (DO NOT EXCEED 16 GRAMS DAILY TO ANY AFFECTED JOINT OF THE LOWER EXTREMITIES. DO NOT EXCEED 8 GRAMS DAILY TO ANY AFFECTED JOINT OF THE UPPER EXTREMITIES. DO NOT EXCEED A TOTAL DOSE OF 32 GRAMS DAILY OVER ALL JOINTS)      Empagliflozin 25 MG TABS 12.5 mg      Entresto 24-26 MG TABS Take 1 tablet by mouth 2 (two) times a day      erythromycin (ILOTYCIN) ophthalmic ointment apply A 1 CM RIBBON OF OINTMENT INTO LOWER CONJUNCTIVAL SAC OF RI...  (REFER TO PRESCRIPTION NOTES).      escitalopram (LEXAPRO) 10 mg tablet Take 1 tablet (10 mg total) by mouth daily 90 tablet 1    fluticasone (FLONASE) 50 mcg/act nasal spray INSTILL 1 SPRAY IN NOSTRIL(S) TWICE A DAY FOR NASAL ALLERGIES IN EACH NOSTRIL (Patient not taking: Reported on 2/5/2025)      furosemide (LASIX) 40 mg tablet Take 40 mg by mouth daily (Patient not taking: Reported on 2/5/2025)      glucose blood test strip Test once daily and as needed 300 each 3    LORazepam (ATIVAN) 1 mg tablet Take 1 mg by mouth every 8 (eight) hours as needed for anxiety      metoprolol succinate (TOPROL-XL) 50 mg 24 hr tablet Take 50 mg by mouth       Multiple Vitamin (MULTIVITAMINS PO) TAKE ONE CAP/TAB BY MOUTH EVERY MORNING FOR SUPPLEMENTATION (Patient not taking: Reported on 2/5/2025)      naloxone (NARCAN) 4 mg/0.1 mL nasal spray Administer 1 spray into a nostril. If no response after 2-3 minutes, give another dose in the other nostril using a new spray. (Patient not taking: Reported on 7/30/2024) 1 each 1    oxyCODONE-acetaminophen (Percocet) 2.5-325 MG per tablet Take 1 tablet by mouth every 8 (eight) hours as needed for moderate pain Max Daily Amount: 3 tablets (Patient not taking: Reported on 2/5/2025) 6 tablet 0    pantoprazole (PROTONIX) 20 mg tablet Take 1 tablet (20 mg total) by mouth daily before breakfast 30 tablet 1    simvastatin (ZOCOR) 80 mg tablet Take 80 mg by mouth      spironolactone (ALDACTONE) 25 mg tablet Take 1 tablet by mouth daily      valsartan (DIOVAN) 160 mg tablet Take 160 mg by mouth daily       No current facility-administered medications for this visit.     Objective   There were no vitals taken for this visit.    Physical Exam  Vitals reviewed.   HENT:      Head: Normocephalic.   Cardiovascular:      Rate and Rhythm: Normal rate and regular rhythm.      Heart sounds: Normal heart sounds.   Pulmonary:      Effort: Pulmonary effort is normal.      Breath sounds: Normal breath sounds.   Abdominal:      General: Bowel sounds are normal.      Tenderness: There is abdominal tenderness (Epigastric region and periumbilical region). There is no guarding or rebound.   Skin:     General: Skin is warm and dry.   Neurological:      Mental Status: He is alert and oriented to person, place, and time.           Lab Results: I personally reviewed relevant lab results.

## 2025-02-21 ENCOUNTER — RESULTS FOLLOW-UP (OUTPATIENT)
Dept: GASTROENTEROLOGY | Facility: CLINIC | Age: 80
End: 2025-02-21

## 2025-02-21 ENCOUNTER — TELEPHONE (OUTPATIENT)
Age: 80
End: 2025-02-21

## 2025-02-21 ENCOUNTER — TELEPHONE (OUTPATIENT)
Dept: PREADMISSION TESTING | Facility: HOSPITAL | Age: 80
End: 2025-02-21

## 2025-02-21 NOTE — TELEPHONE ENCOUNTER
Rcvd call from patient's sister who said that patient was in the ED yesterday with a UTI.  They placed a catheter and now their is urine leaking around his penis.  No OV available per scheduled and checked with office. Advised her to take him to urgent care or the ED.  She agreed and will take him now.

## 2025-02-26 ENCOUNTER — TELEPHONE (OUTPATIENT)
Dept: GASTROENTEROLOGY | Facility: MEDICAL CENTER | Age: 80
End: 2025-02-26

## 2025-02-26 NOTE — TELEPHONE ENCOUNTER
Called pt to confirm procedure for 03/04/2025 EGD at [facility] with Dr. Guerrier. This procedure has been confirmed. Did go over instructions and diabetic instructions as well.      missed call  (Newest Message First)  View All Conversations on this Encounter  Kalee Whitten routed conversation to Gastroenterology Víctor Vargas55 minutes ago (1:38 PM)     Kalee Whitten55 minutes ago (1:38 PM)     NC  Pt's relative called back stating that he missed a call from the GI office. I didn't see any recent notes from today         Note         Benny Merino5 days ago     LS  Pt's relative Cheo returning call, I left a message with Pt all back info on Pre Ad VM          Note         PRAMOD Borden Patrick N [phone] days ago     DB  GI phone assmt attempted. LM with GI # for call back and GI office # if rescheduling needed.   Outgoing call     Recent Patient Communication     Last Update Description Specialty     Today Procedure Confirmed Gastroenterology     Pg Gastro Spclst Víctor Morton, Leila Open     Today missed call Pre-Admission Testing      Mayda Kulkarni Closed     5 days ago Leaking catheter Family Medicine     Pg Primary Care [facility] Pod Pastor Priya Sofía Closed     5 days ago -- Pre-Admission Testing      Mayda Kulkarni Open     6 days ago Procedure Scheduled Gastroenterology     Pg Gastro Spclst Víctor Morton Leila Closed       There are additional recent communications with this patient. View the rest in Chart Review.

## 2025-02-26 NOTE — TELEPHONE ENCOUNTER
Pt's relative called back stating that he missed a call from the GI office. I didn't see any recent notes from today

## 2025-03-04 ENCOUNTER — HOSPITAL ENCOUNTER (OUTPATIENT)
Dept: GASTROENTEROLOGY | Facility: HOSPITAL | Age: 80
Setting detail: OUTPATIENT SURGERY
Discharge: HOME | End: 2025-03-04
Payer: COMMERCIAL

## 2025-03-04 ENCOUNTER — ANESTHESIA EVENT (OUTPATIENT)
Dept: GASTROENTEROLOGY | Facility: HOSPITAL | Age: 80
End: 2025-03-04
Payer: COMMERCIAL

## 2025-03-04 ENCOUNTER — ANESTHESIA (OUTPATIENT)
Dept: GASTROENTEROLOGY | Facility: HOSPITAL | Age: 80
End: 2025-03-04
Payer: COMMERCIAL

## 2025-03-04 VITALS
OXYGEN SATURATION: 100 % | HEART RATE: 65 BPM | DIASTOLIC BLOOD PRESSURE: 74 MMHG | TEMPERATURE: 97.4 F | RESPIRATION RATE: 18 BRPM | SYSTOLIC BLOOD PRESSURE: 153 MMHG

## 2025-03-04 DIAGNOSIS — K29.70 GASTRITIS WITHOUT BLEEDING, UNSPECIFIED CHRONICITY, UNSPECIFIED GASTRITIS TYPE: Primary | ICD-10-CM

## 2025-03-04 DIAGNOSIS — R10.84 GENERALIZED ABDOMINAL PAIN: ICD-10-CM

## 2025-03-04 LAB — GLUCOSE SERPL-MCNC: 124 MG/DL (ref 65–140)

## 2025-03-04 PROCEDURE — 43239 EGD BIOPSY SINGLE/MULTIPLE: CPT | Performed by: STUDENT IN AN ORGANIZED HEALTH CARE EDUCATION/TRAINING PROGRAM

## 2025-03-04 PROCEDURE — 82948 REAGENT STRIP/BLOOD GLUCOSE: CPT

## 2025-03-04 PROCEDURE — 88305 TISSUE EXAM BY PATHOLOGIST: CPT | Performed by: STUDENT IN AN ORGANIZED HEALTH CARE EDUCATION/TRAINING PROGRAM

## 2025-03-04 RX ORDER — OMEPRAZOLE 40 MG/1
40 CAPSULE, DELAYED RELEASE ORAL DAILY
Qty: 30 CAPSULE | Refills: 11 | Status: SHIPPED | OUTPATIENT
Start: 2025-03-04

## 2025-03-04 RX ORDER — KETAMINE HCL IN NACL, ISO-OSM 100MG/10ML
SYRINGE (ML) INJECTION AS NEEDED
Status: DISCONTINUED | OUTPATIENT
Start: 2025-03-04 | End: 2025-03-04

## 2025-03-04 RX ORDER — OLANZAPINE 5 MG/1
5 TABLET ORAL
COMMUNITY
Start: 2024-09-24

## 2025-03-04 RX ORDER — LIDOCAINE HYDROCHLORIDE 10 MG/ML
INJECTION, SOLUTION EPIDURAL; INFILTRATION; INTRACAUDAL; PERINEURAL AS NEEDED
Status: DISCONTINUED | OUTPATIENT
Start: 2025-03-04 | End: 2025-03-04

## 2025-03-04 RX ORDER — PROPOFOL 10 MG/ML
INJECTION, EMULSION INTRAVENOUS AS NEEDED
Status: DISCONTINUED | OUTPATIENT
Start: 2025-03-04 | End: 2025-03-04

## 2025-03-04 RX ORDER — GLYCOPYRROLATE 0.2 MG/ML
INJECTION INTRAMUSCULAR; INTRAVENOUS AS NEEDED
Status: DISCONTINUED | OUTPATIENT
Start: 2025-03-04 | End: 2025-03-04

## 2025-03-04 RX ORDER — SODIUM CHLORIDE, SODIUM LACTATE, POTASSIUM CHLORIDE, CALCIUM CHLORIDE 600; 310; 30; 20 MG/100ML; MG/100ML; MG/100ML; MG/100ML
125 INJECTION, SOLUTION INTRAVENOUS CONTINUOUS
Status: DISCONTINUED | OUTPATIENT
Start: 2025-03-04 | End: 2025-03-08 | Stop reason: HOSPADM

## 2025-03-04 RX ADMIN — GLYCOPYRROLATE 0.1 MG: 0.2 INJECTION, SOLUTION INTRAMUSCULAR; INTRAVENOUS at 08:40

## 2025-03-04 RX ADMIN — PROPOFOL 10 MG: 10 INJECTION, EMULSION INTRAVENOUS at 08:43

## 2025-03-04 RX ADMIN — LIDOCAINE HYDROCHLORIDE 50 MG: 10 SOLUTION INTRAVENOUS at 08:40

## 2025-03-04 RX ADMIN — SODIUM CHLORIDE, SODIUM LACTATE, POTASSIUM CHLORIDE, AND CALCIUM CHLORIDE: .6; .31; .03; .02 INJECTION, SOLUTION INTRAVENOUS at 08:37

## 2025-03-04 RX ADMIN — PROPOFOL 20 MG: 10 INJECTION, EMULSION INTRAVENOUS at 08:42

## 2025-03-04 RX ADMIN — Medication 20 MG: at 08:41

## 2025-03-04 NOTE — ANESTHESIA PREPROCEDURE EVALUATION
Procedure:  EGD    Relevant Problems   CARDIO   (+) Bilateral carotid artery stenosis   (+) Coronary atherosclerosis   (+) Essential hypertension      ENDO   (+) Type 2 diabetes mellitus with diabetic polyneuropathy (HCC)      /RENAL   (+) BPH with urinary obstruction   (+) Renal cyst   (+) Stage 3a chronic kidney disease (HCC)      HEMATOLOGY   (+) Anemia      MUSCULOSKELETAL   (+) Back pain   (+) Glenohumeral arthritis, right   (+) Primary osteoarthritis of right hip      NEURO/PSYCH   (+) Anxiety   (+) IVH (intraventricular hemorrhage) ([facility])   (+) Moderate recurrent major depression ([facility])   (+) Type 2 diabetes mellitus with diabetic polyneuropathy ([facility])      Nervous and Auditory   (+) Hearing loss      Neurology/Sleep   (+) Chronic insomnia   (+) Excessive daytime sleepiness   (+) Parkinson's disease, unspecified whether dyskinesia present, unspecified whether manifestations fluctuate ([facility])      Blood   (+) History of disease of blood and blood-forming organs      Eye   (+) 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 macular degeneration, unspecified stage ([facility])      Orthopedic/Musculoskeletal   (+) Fracture of C1 vertebra, closed ([facility])      Cardiovascular/Peripheral Vascular   (+) Heart failure with reduced ejection fraction ([facility])   (+) Ischemic cardiomyopathy      FEN/Gastrointestinal   (+) Diverticulosis of colon   (+) Irritable bowel syndrome      Other   (+) Dyslipidemia   (+) Proteinuria   (+) Vitamin D deficiency        Physical Exam    Airway    Mallampati score: II  TM Distance: <3 FB  Neck ROM: full     Dental        Cardiovascular  Cardiovascular exam normal    Pulmonary  Pulmonary exam normal     Other Findings    Anesthesia Plan  ASA Score- 4     Anesthesia Type- IV sedation with anesthesia with ASA Monitors.         Additional Monitors:     Airway Plan:     Comment: ECHO 2D COMPLETE  Order: [id number]  Narrative    ·  Technically difficult study. Consider repeating with echo contrast for  more accurate LV  functional assessment.  ·  Left Ventricle: Left ventricle was not well visualized. Wall thickness  was not well visualized. Systolic function is severely decreased with an  ejection fraction of 25-30%. Akinesis of the inferior, inferoseptal, and  inferolateral walls. There is grade I (mild) diastolic dysfunction and  normal left atrial pressure.  ·  Right Ventricle: Right ventricle was not well visualized. Systolic  function is challenging to estimate but appears at least mildly reduced.  ·  No significant valvular dysfunction  ·  Pulmonic Valve: Pulsed Wave Doppler notching of the right ventricular  outflow tract was seen, which is consistent with pulmonary hypertension.    Left Ventricle  Left ventricle was not well visualized. Wall thickness was not well visualized. Systolic function is severely decreased with an ejection fraction of 25-30%. Akinesis of the inferior, inferoseptal, and inferolateral walls. There is grade I (mild) diastolic dysfunction and normal left atrial pressure.    Right Ventricle  Right ventricle was not well visualized. Systolic function is challenging to estimate but appears at least mildly reduced.    Left Atrium  Left atrium cavity size is normal.    Right Atrium  Right atrium cavity is normal.    IVC/SVC  IVC not well visualized.    Mitral Valve  Mitral valve structure is normal. There is mild regurgitation. There is no evidence of mitral valve stenosis.    Tricuspid Valve  Tricuspid valve structure is normal. There is mild regurgitation. There is no evidence of tricuspid valve stenosis.    Aortic Valve  The aortic valve is trileaflet. The leaflets are mildly calcified. There is mild regurgitation. There is no evidence of aortic valve stenosis.    Pulmonic Valve  The pulmonic valve was not well visualized. There is mild regurgitation. There is no evidence of pulmonic valve stenosis. Pulsed Wave Doppler notching of the right ventricular outflow tract was seen, which is consistent with  pulmonary hypertension.    Ascending Aorta  The aorta appears normal in size.    Pericardium  There is no pericardial effusion.    Study Details  A complete 2D echocardiogram was performed. Color flow, Pulse Wave and Continuous Wave Doppler was performed and analyzed.Overall the study quality was adequate.    Prior Study  There is a prior study available for comparison that was performed on 1/18/2023.  All Measurements    Exam End: 11/21/24 11:23 Last Resulted: 11/21/24 11:42  Received From: [facility]  Result Received: 12/23/24 11:39   View Encount  .       Plan Factors-Exercise tolerance (METS): <4 METS.    Chart reviewed.  Imaging results reviewed. Existing labs reviewed. Patient summary reviewed.    Patient is not a current smoker. Patient not instructed to abstain from smoking on day of procedure. Patient did not smoke on day of surgery.            Induction- intravenous.    Postoperative Plan-         Informed Consent- Anesthetic plan and risks discussed with patient.  I personally reviewed this patient with the CRNA. Discussed and agreed on the Anesthesia Plan with the CRNA..      NPO Status:  No vitals data found for the desired time range.

## 2025-03-04 NOTE — H&P
History and Physical -  [facility]  Elder Haro 79 y.o. male MRN: [id number]                  HPI: Elder Haro is a 79 y.o. year old male who presents for generalized abdominal pain.    Patient is at an elevated procedure risk due to his HFrEF. Discussed risk/benefits of proceeding with EGD vs pursuing UGI and patient strongly prefers EGD.      REVIEW OF SYSTEMS: Per the HPI, and otherwise unremarkable.    Historical Information   Past Medical History:   Diagnosis Date    Anxiety     Arthritis     BPH with obstruction/lower urinary tract symptoms     Diabetes mellitus (HCC)     Hypertension     Renal calculi     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HCC) 10/15/2019    Ureter, calculus     Urinary stone      Past Surgical History:   Procedure Laterality Date    CARDIAC SURGERY      CHOLECYSTECTOMY      CYSTOSCOPY W/ URETEROSCOPY       Social History   Social History     Substance and Sexual Activity   Alcohol Use Not Currently    Comment: ON OCASSION     Social History     Substance and Sexual Activity   Drug Use No     Social History     Tobacco Use   Smoking Status Never   Smokeless Tobacco Never     Family History   Problem Relation Age of Onset    Cancer Mother     Colon cancer Mother     Arthritis Father     Heart disease Father         cardiac disorder    Ulcers Father     Ulcers Sister     Cancer Family     Arthritis Family        Meds/Allergies       Current Outpatient Medications:     carbidopa-levodopa (SINEMET CR)  mg TBCR per ER tablet    Entresto 24-26 MG TABS    escitalopram (LEXAPRO) 10 mg tablet    LORazepam (ATIVAN) 1 mg tablet    metoprolol succinate (TOPROL-XL) 50 mg 24 hr tablet    OLANZapine (ZyPREXA) 5 mg tablet    oxyCODONE-acetaminophen (Percocet) 2.5-325 MG per tablet    pantoprazole (PROTONIX) 20 mg tablet    simvastatin (ZOCOR) 80 mg tablet    Alogliptin Benzoate 25 MG TABS    clotrimazole-betamethasone (LOTRISONE) 1-0.05 % cream     dextran 70-hypromellose (GENTEAL TEARS) 0.1-0.3 % ophthalmic solution    Diclofenac Sodium (VOLTAREN) 1 %    Empagliflozin 25 MG TABS    erythromycin (ILOTYCIN) ophthalmic ointment    fluticasone (FLONASE) 50 mcg/act nasal spray    furosemide (LASIX) 40 mg tablet    glipiZIDE POWD    glucose blood test strip    Multiple Vitamin (MULTIVITAMINS PO)    naloxone (NARCAN) 4 mg/0.1 mL nasal spray    Omeprazole POWD    spironolactone (ALDACTONE) 25 mg tablet    valsartan (DIOVAN) 160 mg tablet    Current Facility-Administered Medications:     lactated ringers infusion, 125 mL/hr, Intravenous, Continuous    Allergies   Allergen Reactions    Ketotifen Diarrhea and Other (See Comments)    Metformin Diarrhea       Objective     /84   Pulse 72   Temp (!) 96.8 °F (36 °C) (Temporal)   Resp 16   SpO2 100%       PHYSICAL EXAM    Gen: NAD  Head: NCAT  CV: RRR  CHEST: Clear  ABD: soft, NT/ND  EXT: no edema      ASSESSMENT/PLAN:  This is a 79 y.o. year old male here for EGD, and he is stable and optimized for his procedure.

## 2025-03-10 ENCOUNTER — RESULTS FOLLOW-UP (OUTPATIENT)
Dept: GASTROENTEROLOGY | Facility: MEDICAL CENTER | Age: 80
End: 2025-03-10

## 2025-03-10 PROCEDURE — 88305 TISSUE EXAM BY PATHOLOGIST: CPT | Performed by: STUDENT IN AN ORGANIZED HEALTH CARE EDUCATION/TRAINING PROGRAM

## 2025-03-17 ENCOUNTER — NURSE TRIAGE (OUTPATIENT)
Age: 80
End: 2025-03-17

## 2025-03-17 NOTE — TELEPHONE ENCOUNTER
The patient called in to speak with someone regarding abdominal pain. He recently had a procedure done, and there were no findings or anything concerning per patient. However, the patient is not sure what to do moving forward to alleviate the pain. I transferred the patient to an RN Sofía for further assistance.    
abdominal

## 2025-03-17 NOTE — TELEPHONE ENCOUNTER
"FOLLOW UP: home care advice given     REASON FOR CONVERSATION: Abdominal Pain    SYMPTOMS: upper abdominal pain unrelieved with Omeorazole 40 mg daily after 1 week     OTHER: advised to add Pepcid 20 mg HS and gaviscon otc as needed, if no relief after 2 more weeks to call back     DISPOSITION: Home Care          Reason for Disposition   Mild abdominal pain    Answer Assessment - Initial Assessment Questions  1. LOCATION: \"Where does it hurt?\"       Upper abdomen   2. RADIATION: \"Does the pain shoot anywhere else?\" (e.g., chest, back)      Denies   3. ONSET: \"When did the pain begin?\" (e.g., minutes, hours or days ago)       Ongoing since EGD 3/4/25  4. SUDDEN: \"Gradual or sudden onset?\"      gradual  5. PATTERN \"Does the pain come and go, or is it constant?\"      Constant   6. SEVERITY: \"How bad is the pain?\"  (e.g., Scale 1-10; mild, moderate, or severe)      Mild to moderate   7. RECURRENT SYMPTOM: \"Have you ever had this type of stomach pain before?\" If Yes, ask: \"When was the last time?\" and \"What happened that time?\"       Yes, acid irritation in stomach   8. AGGRAVATING FACTORS: \"Does anything seem to cause this pain?\" (e.g., foods, stress, alcohol)      Unsure   9. CARDIAC SYMPTOMS: \"Do you have any of the following symptoms: chest pain, difficulty breathing, sweating, nausea?\"      Denies   10. OTHER SYMPTOMS: \"Do you have any other symptoms?\" (e.g., back pain, diarrhea, fever, urination pain, vomiting)        Denies    Protocols used: Abdominal Pain - Upper-Adult-OH    "

## 2025-03-25 ENCOUNTER — TELEPHONE (OUTPATIENT)
Age: 80
End: 2025-03-25

## 2025-03-25 DIAGNOSIS — G20.A1 PARKINSON'S DISEASE, UNSPECIFIED WHETHER DYSKINESIA PRESENT, UNSPECIFIED WHETHER MANIFESTATIONS FLUCTUATE (HCC): Primary | ICD-10-CM

## 2025-03-25 NOTE — TELEPHONE ENCOUNTER
I placed the referral and will be faxing the referral to rehabilitation at [facility] at [phone], I had to call patient to ask him where di he go for therapy and also I had to search the therapy place to ask for the fax number.

## 2025-04-28 ENCOUNTER — TELEPHONE (OUTPATIENT)
Dept: FAMILY MEDICINE CLINIC | Facility: CLINIC | Age: 80
End: 2025-04-28

## 2025-04-28 NOTE — TELEPHONE ENCOUNTER
Dr. Melgar reviewed MRI yesterday afternoon.  Please advise patient.  He would like new records from PCP and labs if available.  Please request records. LM for pt to call back, we have forms from [facility]. Wanted to see if pt needs pre op appt or not. Will scan papers into chart.